# Patient Record
Sex: MALE | Race: WHITE | NOT HISPANIC OR LATINO | Employment: OTHER | URBAN - METROPOLITAN AREA
[De-identification: names, ages, dates, MRNs, and addresses within clinical notes are randomized per-mention and may not be internally consistent; named-entity substitution may affect disease eponyms.]

---

## 2017-01-09 DIAGNOSIS — J45.909 REACTIVE AIRWAY DISEASE: Primary | ICD-10-CM

## 2017-01-09 NOTE — TELEPHONE ENCOUNTER
montelukast (SINGULAIR) 10 MG tablet            Last Written Prescription Date: 11/21/2016  Last Fill Quantity: 30, # refills: 1    Last Office Visit with [facility], [facility] or [facility] prescribing provider:  12/14/2016   Future Office Visit:       Date of Last Asthma Action Plan Letter:   There are no preventive care reminders to display for this patient.   Asthma Control Test:   ACT Total Scores 12/14/2016   ACT TOTAL SCORE (Goal Greater than or Equal to 20) 15   In the past 12 months, how many times did you visit the emergency room for your asthma without being admitted to the hospital? 0   In the past 12 months, how many times were you hospitalized overnight because of your asthma? 0       Date of Last Spirometry Test:   No results found for this or any previous visit.

## 2017-01-10 RX ORDER — MONTELUKAST SODIUM 10 MG/1
TABLET ORAL
Qty: 30 TABLET | Refills: 3 | Status: SHIPPED | OUTPATIENT
Start: 2017-01-10 | End: 2017-02-20

## 2017-01-10 NOTE — TELEPHONE ENCOUNTER
Prescription approved per [facility].    Kelly Mcghee RN

## 2017-01-11 DIAGNOSIS — J45.909 REACTIVE AIRWAY DISEASE: Primary | ICD-10-CM

## 2017-01-11 NOTE — TELEPHONE ENCOUNTER
Per fax from pharmacy, 90 day supply is being requested - pended  Due for OV mid/late January    Pending Prescriptions:                       Disp   Refills    montelukast (SINGULAIR) 10 MG tablet      90 tab*0            Sig: TAKE 1 TABLET (10 MG) BY MOUTH AT BEDTIME - OV           due before end of January           Last Written Prescription Date: 1-10-17 30 day supply  Last Fill Quantity: 30, # refills: 3    Last Office Visit with [facility], [facility] or [facility] prescribing provider:  12-14-16   Future Office Visit:       Date of Last Asthma Action Plan Letter:   There are no preventive care reminders to display for this patient.   Asthma Control Test:   ACT Total Scores 12/14/2016   ACT TOTAL SCORE (Goal Greater than or Equal to 20) 15   In the past 12 months, how many times did you visit the emergency room for your asthma without being admitted to the hospital? 0   In the past 12 months, how many times were you hospitalized overnight because of your asthma? 0       Date of Last Spirometry Test:   No results found for this or any previous visit.    RT Annette (R)

## 2017-01-12 RX ORDER — MONTELUKAST SODIUM 10 MG/1
TABLET ORAL
Qty: 90 TABLET | Refills: 0 | OUTPATIENT
Start: 2017-01-12

## 2017-01-17 DIAGNOSIS — J98.01 BRONCHOSPASM: Primary | ICD-10-CM

## 2017-01-17 RX ORDER — FLUTICASONE PROPIONATE 110 UG/1
1 AEROSOL, METERED RESPIRATORY (INHALATION) 2 TIMES DAILY
Qty: 1 INHALER | Refills: 11 | Status: SHIPPED | OUTPATIENT
Start: 2017-01-17 | End: 2017-02-20

## 2017-01-17 NOTE — TELEPHONE ENCOUNTER
FLUTICASONE PROPIONATE  MCG/ACT IN AERO        Last Written Prescription Date:  ?  Last Fill Quantity: ?,   # refills: ?  Last Office Visit with [facility], [facility] or [facility] prescribing provider: 12/14/2016  Future Office visit:       Routing refill request to provider for review/approval because:  Drug not active on patient's medication list

## 2017-01-31 ENCOUNTER — TELEPHONE (OUTPATIENT)
Dept: FAMILY MEDICINE | Facility: CLINIC | Age: 67
End: 2017-01-31

## 2017-01-31 NOTE — TELEPHONE ENCOUNTER
Pt requesting appt. Was last seen 12/14/16, and was to be seen no later than 6 weeks out, which would be now.    Pt states had some improvement from last OV, but has plateaued and is not getting better. Requesting workin appt.  Please advise if any work in availability.  Teresita Cutler RN

## 2017-01-31 NOTE — TELEPHONE ENCOUNTER
Reason for call:  Patient reporting a symptom    Symptom or request: Asthma,    Duration (how long have symptoms been present): since November    Have you been treated for this before? Yes    Additional comments: has not gone away. Still having problems    Phone Number patient can be reached at:  Home number on file [phone] (Cell)    Best Time:  any    Can we leave a detailed message on this number:  YES    Call taken on 1/31/2017 at 9:24 AM by Kristin Trevino

## 2017-02-03 ENCOUNTER — TELEPHONE (OUTPATIENT)
Dept: FAMILY MEDICINE | Facility: CLINIC | Age: 67
End: 2017-02-03

## 2017-02-03 NOTE — TELEPHONE ENCOUNTER
Reason for Call:  Form, our goal is to have forms completed with 72 hours, however, some forms may require a visit or additional information.    Type of letter, form or note:  [employer]    Who is the form from?: Insurance comp    Where did the form come from: Patient or family brought in       What clinic location was the form placed at?: [facility]    Where the form was placed: Given to MA/RN    What number is listed as a contact on the form?: [phone]       Additional comments: Pt said this is time sensitive and wants it   Filled out asap; please call the pt when this is taken care of;  He will pick it up and fax it himself    Call taken on 2/3/2017 at 1:02 PM by Felicitas Braswell

## 2017-02-06 ENCOUNTER — TELEPHONE (OUTPATIENT)
Dept: FAMILY MEDICINE | Facility: CLINIC | Age: 67
End: 2017-02-06

## 2017-02-06 DIAGNOSIS — J98.01 ACUTE BRONCHOSPASM: Primary | ICD-10-CM

## 2017-02-06 NOTE — TELEPHONE ENCOUNTER
Forms left at , patient notified.  Copy sent to scanning and file.  Lashon Patrick, [facility]

## 2017-02-06 NOTE — TELEPHONE ENCOUNTER
Rebeka or Dontrell,  Have you guys seen these forms? They were not in Dr. Sotelo's outbox when I checked and the outgoing faxes were already emptied

## 2017-02-06 NOTE — TELEPHONE ENCOUNTER
Fax from [facility] #[id number] requesting 90 day supply of Advair.    Per 12-14-16 OV notes, patient was to have follow-up with you no later than 4-6 weeks.  Patient had appointment on 2-3-17 but cancelled.    Medication pended.    Please advise about follow up.    RT Annette (R)

## 2017-02-07 ENCOUNTER — TELEPHONE (OUTPATIENT)
Dept: FAMILY MEDICINE | Facility: CLINIC | Age: 67
End: 2017-02-07

## 2017-02-07 NOTE — TELEPHONE ENCOUNTER
Reason for Call:  Medication or medication refill:    Do you use a [facility]?  Name of the pharmacy and phone number for the current request:       [facility]/PHARMACY #[id number] - [facility] [address]      Name of the medication requested:     fluticasone-salmeterol (ADVAIR) 250-50 MCG/DOSE diskus inhaler         Other request: Pt's INS will only fill a 90 day supply. After review of the notes  I told pt that he needs to be seen. Pt said that when we called him to cancel his apt.  Pt only has 1 week supply left   On 2/3 Per Dr. Sotelo they rescheduled for 2/20 at 10:30am- I do not see this booked.    Can we leave a detailed message on this number? YES    Phone number patient can be reached at: Cell number on file:    Telephone Information:   Mobile [phone]       Best Time: anytime    Call taken on 2/7/2017 at 12:56 PM by Lelo Hernández

## 2017-02-07 NOTE — TELEPHONE ENCOUNTER
"We sent in 3 inhalers yesterday.  I think that is a 90 day supply.     Rebeka, can you look into the future appt that is not showing up?  I am curious about the \"They\" part of message.     ABNER Brooks          02/06/17 1524 Taking Flag Checked [facility] MD        Medication Detail         Disp Refills Start End DIMA     fluticasone-salmeterol (ADVAIR) 250-50 MCG/DOSE diskus inhaler 3 Inhaler 0 2/6/2017  No     Sig: Inhale 1 puff into the lungs 2 times daily     Class: E-Prescribe             E-Prescribing Status: Receipt confirmed by pharmacy (2/6/2017  3:24 PM CST)       Pharmacy      [facility]/PHARMACY #[id number] - [place], [place] - [address] [address]           "

## 2017-02-08 NOTE — TELEPHONE ENCOUNTER
Pt is now scheduled 02/20 at 11am.  [facility] to inform Pt.

## 2017-02-20 ENCOUNTER — OFFICE VISIT (OUTPATIENT)
Dept: FAMILY MEDICINE | Facility: CLINIC | Age: 67
End: 2017-02-20
Payer: MEDICARE

## 2017-02-20 VITALS
TEMPERATURE: 98 F | HEIGHT: 77 IN | BODY MASS INDEX: 27.22 KG/M2 | OXYGEN SATURATION: 95 % | WEIGHT: 230.5 LBS | HEART RATE: 88 BPM | SYSTOLIC BLOOD PRESSURE: 137 MMHG | DIASTOLIC BLOOD PRESSURE: 91 MMHG

## 2017-02-20 DIAGNOSIS — J45.901 REACTIVE AIRWAY DISEASE, UNSPECIFIED ASTHMA SEVERITY, WITH ACUTE EXACERBATION: ICD-10-CM

## 2017-02-20 DIAGNOSIS — I10 BENIGN ESSENTIAL HYPERTENSION: ICD-10-CM

## 2017-02-20 DIAGNOSIS — R05.3 CHRONIC COUGH: Primary | ICD-10-CM

## 2017-02-20 PROCEDURE — 99213 OFFICE O/P EST LOW 20 MIN: CPT | Performed by: INTERNAL MEDICINE

## 2017-02-20 RX ORDER — HYDRALAZINE HYDROCHLORIDE 50 MG/1
50 TABLET, FILM COATED ORAL 3 TIMES DAILY
Qty: 90 TABLET | Refills: 11 | Status: SHIPPED | OUTPATIENT
Start: 2017-02-20 | End: 2017-03-22

## 2017-02-20 RX ORDER — MONTELUKAST SODIUM 10 MG/1
TABLET ORAL
Qty: 30 TABLET | Refills: 3 | Status: SHIPPED | OUTPATIENT
Start: 2017-02-20 | End: 2017-08-04

## 2017-02-20 NOTE — MR AVS SNAPSHOT
After Visit Summary   2/20/2017    Alexandro Lee    MRN: [id number]           Patient Information     Date Of Birth          1950        Visit Information        Provider Department      2/20/2017 11:00 AM [facility] Sotelo MD [facility]        Today's Diagnoses     Chronic cough    -  1    Benign essential hypertension        Reactive airway disease, unspecified asthma severity, with acute exacerbation          Care Instructions    To address your chronic cough:    1.  Stop taking losartan and replace losartan with hydralazine 50mg three times daily     2.  Call to schedule pulmonary function testing so we can find out if asthma is the cause of your disabling cough.  [facility] ATS Registered: [phone]    3.  Schedule an appointment with Dr. Kevan Park  Allergy   [address] [id number] [phone]    Who is a cough specialist in case the above does not address your cough      4.  Return to see me in 4 weeks so we can follow up on your cough and blood pressure         Follow-ups after your visit        Follow-up notes from your care team     Return in about 4 weeks (around 3/20/2017) for Routine Visit.      Future tests that were ordered for you today     Open Future Orders        Priority Expected Expires Ordered    General PFT Lab (Please always keep checked) Routine  2/20/2018 2/20/2017    Pulmonary Function Test Routine  2/20/2018 2/20/2017            Who to contact     If you have questions or need follow up information about today's clinic visit or your schedule please contact [facility] directly at [phone].  Normal or non-critical lab and imaging results will be communicated to you by MyChart, letter or phone within 4 business days after the clinic has received the results. If you do not hear from us within 7 days, please contact the clinic through MyChart or phone. If you have a critical or  "abnormal lab result, we will notify you by phone as soon as possible.  Submit refill requests through [employer] or call your pharmacy and they will forward the refill request to us. Please allow 3 business days for your refill to be completed.          Additional Information About Your Visit        [facility]at[facility] [employer] Information     [employer] lets you send messages to your doctor, view your test results, renew your prescriptions, schedule appointments and more. To sign up, go to [email or web address] . Click on \"Log in\" on the left side of the screen, which will take you to the Welcome page. Then click on \"Sign up Now\" on the right side of the page.     You will be asked to enter the access code listed below, as well as some personal information. Please follow the directions to create your username and password.     Your access code is: [email or web address]  Expires: 2017 11:46 AM     Your access code will  in 90 days. If you need help or a new code, please call your [facility] clinic or [phone].        Care EveryWhere ID     This is your Care EveryWhere ID. This could be used by other organizations to access your [facility] medical records  [id number]        Your Vitals Were     Pulse Temperature Height Pulse Oximetry BMI (Body Mass Index)       88 98  F (36.7  C) (Oral) 6' 5\" (1.956 m) 95% 27.33 kg/m2        Blood Pressure from Last 3 Encounters:   17 (!) 137/91   16 (!) 142/91   16 118/86    Weight from Last 3 Encounters:   17 230 lb 8 oz (104.6 kg)   16 227 lb (103 kg)   16 229 lb 9.6 oz (104.1 kg)                 Today's Medication Changes          These changes are accurate as of: 17 11:47 AM.  If you have any questions, ask your nurse or doctor.               Start taking these medicines.        Dose/Directions    hydrALAZINE 50 MG tablet   Commonly known as:  APRESOLINE   Used for:  Benign essential hypertension   Started by:  Garrett Sotelo MD        Dose:  50 mg   Take 1 " tablet (50 mg) by mouth 3 times daily   Quantity:  90 tablet   Refills:  11         These medicines have changed or have updated prescriptions.        Dose/Directions    montelukast 10 MG tablet   Commonly known as:  SINGULAIR   This may have changed:  See the new instructions.   Used for:  Reactive airway disease, unspecified asthma severity, with acute exacerbation   Changed by:  Garrett Sotelo MD        TAKE 1 TABLET (10 MG) BY MOUTH AT BEDTIME   Quantity:  30 tablet   Refills:  3         Stop taking these medicines if you haven't already. Please contact your care team if you have questions.     fluticasone 110 MCG/ACT Inhaler   Commonly known as:  FLOVENT HFA   Stopped by:  Garrett Sotelo MD           losartan 100 MG tablet   Commonly known as:  COZAAR   Stopped by:  Garrett Sotelo MD           predniSONE 20 MG tablet   Commonly known as:  DELTASONE   Stopped by:  Garrett Sotelo MD                Where to get your medicines      These medications were sent to [facility]/pharmacy #[id number] - [facility] [address] [facility]  [address] [address] [facility] [address]     Phone:  [phone]     hydrALAZINE 50 MG tablet    montelukast 10 MG tablet                Primary Care Provider Office Phone # Fax #    Garrett Sotelo [phone]       [facility] [address] [facility] [address]        Thank you!     Thank you for choosing [facility]  for your care. Our goal is always to provide you with excellent care. Hearing back from our patients is one way we can continue to improve our services. Please take a few minutes to complete the written survey that you may receive in the mail after your visit with us. Thank you!             Your Updated Medication List - Protect others around you: Learn how to safely use, store and throw away your medicines at www.disposemymeds.org.          This list is accurate as of: 2/20/17 11:47 AM.  Always use your most recent med list.                    Brand Name Dispense Instructions for use    albuterol 108 (90 BASE) MCG/ACT Inhaler    PROAIR HFA/PROVENTIL HFA/VENTOLIN HFA    1 Inhaler    Inhale 1-2 puffs into the lungs every 4 hours as needed for shortness of breath / dyspnea or wheezing       AMLODIPINE BESYLATE PO      Take 10 mg by mouth       aspirin 81 MG tablet      Take 81 mg by mouth daily       atenolol 100 MG tablet    TENORMIN    90 tablet    Take 1 tablet by mouth daily.       CENTRUM SILVER per tablet      Take 1 tablet by mouth daily       fluticasone-salmeterol 250-50 MCG/DOSE diskus inhaler    ADVAIR    3 Inhaler    Inhale 1 puff into the lungs 2 times daily       glucosamine-chondroitin 500-400 MG Caps per capsule      Take 1 capsule by mouth 2 times daily       hydrALAZINE 50 MG tablet    APRESOLINE    90 tablet    Take 1 tablet (50 mg) by mouth 3 times daily       hydrochlorothiazide 25 MG tablet    HYDRODIURIL    90 tablet    Take 1 tablet by mouth daily.       montelukast 10 MG tablet    SINGULAIR    30 tablet    TAKE 1 TABLET (10 MG) BY MOUTH AT BEDTIME       OMEPRAZOLE PO      Take 40 mg by mouth every morning

## 2017-02-20 NOTE — PATIENT INSTRUCTIONS
To address your chronic cough:    1.  Stop taking losartan and replace losartan with hydralazine 50mg three times daily     2.  Call to schedule pulmonary function testing so we can find out if asthma is the cause of your disabling cough.  [facility] ATS Registered: [phone]    3.  Schedule an appointment with Dr. Kevan Park  Allergy   [address] [address] [address] [phone]    Who is a cough specialist in case the above does not address your cough      4.  Return to see me in 4 weeks so we can follow up on your cough and blood pressure

## 2017-02-20 NOTE — NURSING NOTE
"Chief Complaint   Patient presents with     Asthma       Initial BP (!) 137/91 (BP Location: Left arm, Patient Position: Chair, Cuff Size: Adult Large)  Pulse 88  Temp 98  F (36.7  C) (Oral)  Ht 6' 5\" (1.956 m)  Wt 230 lb 8 oz (104.6 kg)  SpO2 95%  BMI 27.33 kg/m2 Estimated body mass index is 27.33 kg/(m^2) as calculated from the following:    Height as of this encounter: 6' 5\" (1.956 m).    Weight as of this encounter: 230 lb 8 oz (104.6 kg).  Medication Reconciliation: complete       GUILHERME Peters MA February 20, 2017 11:02 AM      "

## 2017-02-20 NOTE — PROGRESS NOTES
SUBJECTIVE:                                                    Alexandro Lee is a 66 year old male who presents to clinic today for the following health issues:      Chief Complaint   Patient presents with     Asthma         This is a pleasant 66-year-old man with a history of hypertension who has been seen on several occasions with complaints of chronic cough. His cough has been present for at least 3 months. He has been treated with progressive treatments for bronchospasm and asthma without significant improvement in his cough. When specifically questioned today, he states that his most troublesome symptom is actually coughing rather than wheezing or shortness of breath. However, his cough can sometimes become so violent that it is difficult for him to breathe. He states that his cough is triggered by exertion in addition to cold weather. He states that his albuterol inhaler has a minimal impact on his coughing spells. Of note, he previously had to stop taking lisinopril due to cough side effects. He is on a maximal dose of losartan. He denies fevers, chills, weight loss, hemoptysis, chest pains. He did have a CT scan of his chest several weeks ago that did not demonstrate an acute cause for his cough and shortness of breath.    Problem list and histories reviewed & adjusted, as indicated.  Additional history: as documented    Patient Active Problem List   Diagnosis     Essential hypertension     CARDIOVASCULAR SCREENING; LDL GOAL LESS THAN 160     Nonspecific abnormal results of liver function study     Advanced directives, counseling/discussion     Left hip pain     Past Surgical History   Procedure Laterality Date     C nonspecific procedure       Left ACL and PCL repair     Orthopedic surgery  1980's     ACL and PCL repairs left knee     Biopsy       prostate     Arthroplasty hip anterior Left 3/17/2016     Procedure: ARTHROPLASTY HIP ANTERIOR;  Surgeon: Hilario Hicks MD;  Location:  OR       [facility]  History   Substance Use Topics     Smoking status: Never Smoker     Smokeless tobacco: Never Used     Alcohol use 0.0 oz/week     0 Standard drinks or equivalent per week      Comment: 2 glasses wine day     Family History   Problem Relation Age of Onset     DIABETES Father      HEART DISEASE Father          Current Outpatient Prescriptions   Medication Sig Dispense Refill     hydrALAZINE (APRESOLINE) 50 MG tablet Take 1 tablet (50 mg) by mouth 3 times daily 90 tablet 11     montelukast (SINGULAIR) 10 MG tablet TAKE 1 TABLET (10 MG) BY MOUTH AT BEDTIME 30 tablet 3     fluticasone-salmeterol (ADVAIR) 250-50 MCG/DOSE diskus inhaler Inhale 1 puff into the lungs 2 times daily 3 Inhaler 0     albuterol (PROAIR HFA/PROVENTIL HFA/VENTOLIN HFA) 108 (90 BASE) MCG/ACT Inhaler Inhale 1-2 puffs into the lungs every 4 hours as needed for shortness of breath / dyspnea or wheezing 1 Inhaler 11     AMLODIPINE BESYLATE PO Take 10 mg by mouth       aspirin 81 MG tablet Take 81 mg by mouth daily       glucosamine-chondroitin 500-400 MG CAPS Take 1 capsule by mouth 2 times daily       OMEPRAZOLE PO Take 40 mg by mouth every morning       Multiple Vitamins-Minerals (CENTRUM SILVER) per tablet Take 1 tablet by mouth daily       atenolol (TENORMIN) 100 MG tablet Take 1 tablet by mouth daily. 90 tablet 2     hydrochlorothiazide (HYDRODIURIL) 25 MG tablet Take 1 tablet by mouth daily. 90 tablet 3     [DISCONTINUED] montelukast (SINGULAIR) 10 MG tablet TAKE 1 TABLET (10 MG) BY MOUTH AT BEDTIME 30 tablet 3     Allergies   Allergen Reactions     Amoxicillin Hives     hives     Lisinopril      Cough       Tetanus Toxoids      DPT as a child caused severe swelling of arm       ROS:  Constitutional, HEENT, cardiovascular, pulmonary, gi and gu systems are negative, except as otherwise noted.    OBJECTIVE:                                                    BP (!) 137/91 (BP Location: Left arm, Patient Position: Chair, Cuff Size: Adult Large)  Pulse  "88  Temp 98  F (36.7  C) (Oral)  Ht 6' 5\" (1.956 m)  Wt 230 lb 8 oz (104.6 kg)  SpO2 95%  BMI 27.33 kg/m2  Body mass index is 27.33 kg/(m^2).  Gen.: This is a well-appearing middle-aged man in no acute distress. He appears well-nourished. He speaks in full sentences. Pulmonary: The lungs are clear to auscultation bilaterally, no wheezing, no dullness to percussion, no use of accessory muscles, good air movement. HEENT: The bilateral nostrils appear normal, the posterior pharynx appears normal.    Diagnostic Test Results:  Results for orders placed or performed during the hospital encounter of 12/19/16   CT Chest w Contrast    Narrative    CT CHEST WITH CONTRAST   12/19/2016 11:13 AM    HISTORY: Acute bronchospasm. Shortness of breath.    TECHNIQUE: 80 mL Isovue-370 were injected intravenously. After  contrast injection, volumetric helical acquisition was performed from  the thoracic inlet through the upper abdomen. Coronal images were also  reconstructed. Radiation dose for this scan was reduced using  automated exposure control, adjustment of the mA and/or kV according  to patient size, or iterative reconstruction technique.    COMPARISON: None.    FINDINGS: No pleural or pericardial effusions. A borderline enlarged  precarinal lymph node in the mediastinum contains a normal fatty  hilum, and is most likely benign. No other enlarged lymph nodes are  identified in the chest. Atherosclerotic calcification of the thoracic  aorta and coronary arteries. The lungs are clear. Limited views of the  upper abdomen are unremarkable. No aggressive-appearing bone lesions.  Mild degenerative changes throughout the thoracic spine.      Impression    IMPRESSION:   1. No acute abnormality in the chest. No cause for shortness of breath  is identified.   2. A borderline-enlarged mediastinal lymph node in the precarinal  region contains a normal fatty hilum, and is most likely of benign  etiology.    SANTI ATKINS MD    "     ASSESSMENT/PLAN:                                                            1. Chronic cough  This is a middle-age man with hypertension on an angiotensin receptor blocker who presents with chronic cough. He seems to be refractory to aggressive therapies to address bronchospasm and possible asthma. As such, alternative causes for his chronic cough must be entertained. Check pulmonary function testing to obtain more objective data regarding the possibility of bronchospasm contributing to his cough. Consider stopping bronchodilators pending results. Trial off of ARB to see if this address his cough. Replace ARB with hydralazine as below. Will need close follow-up for his blood pressure with these medication changes. Therefore, office visit in 4 weeks. If changing to ARB does not address cough, he was given contact information for a asthma allergy specialist (Dr. Park) who specializes in chronic cough.  - General PFT Lab (Please always keep checked); Future  - Pulmonary Function Test; Future    2. Benign essential hypertension    - hydrALAZINE (APRESOLINE) 50 MG tablet; Take 1 tablet (50 mg) by mouth 3 times daily  Dispense: 90 tablet; Refill: 11    3. Reactive airway disease, unspecified asthma severity, with acute exacerbation    - montelukast (SINGULAIR) 10 MG tablet; TAKE 1 TABLET (10 MG) BY MOUTH AT BEDTIME  Dispense: 30 tablet; Refill: 3    FUTURE APPOINTMENTS:       - Follow-up visit in 4 weeks or sooner pending symptoms    Garrett Sotelo MD  [facility]

## 2017-02-21 ASSESSMENT — ASTHMA QUESTIONNAIRES: ACT_TOTALSCORE: 15

## 2017-03-02 ENCOUNTER — HOSPITAL ENCOUNTER (OUTPATIENT)
Dept: RESPIRATORY THERAPY | Facility: CLINIC | Age: 67
Discharge: HOME | End: 2017-03-02
Attending: INTERNAL MEDICINE | Admitting: INTERNAL MEDICINE
Payer: MEDICARE

## 2017-03-02 ENCOUNTER — HOSPITAL ENCOUNTER (OUTPATIENT)
Dept: LAB | Facility: CLINIC | Age: 67
End: 2017-03-02
Attending: INTERNAL MEDICINE
Payer: MEDICARE

## 2017-03-02 DIAGNOSIS — R05.3 CHRONIC COUGH: ICD-10-CM

## 2017-03-02 LAB
DLCOCOR-%PRED-PRE: 87 %
DLCOCOR-PRE: 26.19 ML/MIN/MMHG
DLCOUNC-%PRED-PRE: 90 %
DLCOUNC-PRE: 27.1 ML/MIN/MMHG
DLCOUNC-PRED: 29.98 ML/MIN/MMHG
ERV-%PRED-PRE: 58 %
ERV-PRE: 0.67 L
ERV-PRED: 1.14 L
EXPTIME-PRE: 8.54 SEC
FEF2575-%PRED-POST: 17 %
FEF2575-%PRED-PRE: 18 %
FEF2575-POST: 0.55 L/SEC
FEF2575-PRE: 0.57 L/SEC
FEF2575-PRED: 3.12 L/SEC
FEFMAX-%PRED-PRE: 33 %
FEFMAX-PRE: 3.5 L/SEC
FEFMAX-PRED: 10.39 L/SEC
FEV1-%PRED-PRE: 34 %
FEV1-PRE: 1.42 L
FEV1FEV6-PRE: 48 %
FEV1FEV6-PRED: 78 %
FEV1FVC-PRE: 46 %
FEV1FVC-PRED: 75 %
FEV1SVC-PRE: 40 %
FEV1SVC-PRED: 75 %
FIFMAX-PRE: 4.6 L/SEC
FRCPLETH-%PRED-PRE: 114 %
FRCPLETH-PRE: 4.68 L
FRCPLETH-PRED: 4.08 L
FVC-%PRED-PRE: 56 %
FVC-PRE: 3.13 L
FVC-PRED: 5.54 L
GAW-%PRED-PRE: 30 %
GAW-PRE: 0.31 L/S/CMH2O
GAW-PRED: 1.03 L/S/CMH2O
HGB BLD-MCNC: 15.9 G/DL (ref 13.3–17.7)
IC-%PRED-PRE: 66 %
IC-PRE: 2.93 L
IC-PRED: 4.4 L
RVPLETH-%PRED-PRE: 144 %
RVPLETH-PRE: 4.02 L
RVPLETH-PRED: 2.78 L
SGAW-%PRED-PRE: 74 %
SGAW-PRE: 0.06 1/CMH2O*S
SGAW-PRED: 0.08 1/CMH2O*S
SRAW-%PRED-PRE: 352 %
SRAW-PRE: 16.79 CMH2O*S
SRAW-PRED: 4.76 CMH2O*S
TLCPLETH-%PRED-PRE: 89 %
TLCPLETH-PRE: 7.61 L
TLCPLETH-PRED: 8.55 L
VA-%PRED-PRE: 67 %
VA-PRE: 5.55 L
VC-%PRED-PRE: 64 %
VC-PRE: 3.59 L
VC-PRED: 5.54 L

## 2017-03-02 PROCEDURE — 94010 BREATHING CAPACITY TEST: CPT

## 2017-03-02 PROCEDURE — 40000275 ZZH STATISTIC RCP TIME EA 10 MIN

## 2017-03-02 PROCEDURE — 94726 PLETHYSMOGRAPHY LUNG VOLUMES: CPT

## 2017-03-02 PROCEDURE — 36415 COLL VENOUS BLD VENIPUNCTURE: CPT | Performed by: INTERNAL MEDICINE

## 2017-03-02 PROCEDURE — 25000125 ZZHC RX 250

## 2017-03-02 PROCEDURE — 94729 DIFFUSING CAPACITY: CPT

## 2017-03-02 PROCEDURE — 85018 HEMOGLOBIN: CPT | Performed by: INTERNAL MEDICINE

## 2017-03-02 PROCEDURE — 40000917 ZZH STATISTIC PEAK FLOW MEASUREMENT

## 2017-03-02 RX ORDER — ALBUTEROL SULFATE 0.83 MG/ML
SOLUTION RESPIRATORY (INHALATION)
Status: COMPLETED
Start: 2017-03-02 | End: 2017-03-02

## 2017-03-02 RX ADMIN — ALBUTEROL SULFATE: 2.5 SOLUTION RESPIRATORY (INHALATION) at 11:31

## 2017-03-02 NOTE — PROGRESS NOTES
PFT Note: Patient completed pulmonary function testing with pre/post spirometry, lung volumes and diffusion. Good patient effort and cooperation. The results of this test met the ATS standards for acceptability and repeatability. Albuterol neb 2.5 mg given for bronchodilation.

## 2017-03-02 NOTE — LETTER
[address] [facility]  [address] [place] [address]  [address]  Tel: [phone]    March 3, 2017    Alexandro MAYO Rosa  [address] [facility] [address]        Dear Tory Rosa,    The following letter pertains to your most recent diagnostic tests:    Your lung function testing shows moderate obstructive airway disease.  This could be the cause of your coughing.  I still think you should see Dr. Park and get his opinion on your cough.  I also hope that the change in you blood pressure medications has helped.  I look forward to seeing you back in a few weeks to recheck your blood pressure.          Sincerely,    Dr. Sotelo        Results for orders placed or performed during the hospital encounter of 03/02/17   General PFT Lab (Please always keep checked)   Result Value Ref Range    FVC-Pred 5.54 L    FVC-Pre 3.13 L    FVC-%Pred-Pre 56 %    FEV1-Pre 1.42 L    FEV1-%Pred-Pre 34 %    FEV1FVC-Pred 75 %    FEV1FVC-Pre 46 %    FEFMax-Pred 10.39 L/sec    FEFMax-Pre 3.50 L/sec    FEFMax-%Pred-Pre 33 %    FEF2575-Pred 3.12 L/sec    FEF2575-Pre 0.57 L/sec    LBU1041-%Pred-Pre 18 %    FEF2575-Post 0.55 L/sec    WRN5831-%Pred-Post 17 %    ExpTime-Pre 8.54 sec    FIFMax-Pre 4.60 L/sec    VC-Pred 5.54 L    VC-Pre 3.59 L    VC-%Pred-Pre 64 %    IC-Pred 4.40 L    IC-Pre 2.93 L    IC-%Pred-Pre 66 %    ERV-Pred 1.14 L    ERV-Pre 0.67 L    ERV-%Pred-Pre 58 %    FEV1FEV6-Pred 78 %    FEV1FEV6-Pre 48 %    FRCPleth-Pred 4.08 L    FRCPleth-Pre 4.68 L    FRCPleth-%Pred-Pre 114 %    RVPleth-Pred 2.78 L    RVPleth-Pre 4.02 L    RVPleth-%Pred-Pre 144 %    TLCPleth-Pred 8.55 L    TLCPleth-Pre 7.61 L    TLCPleth-%Pred-Pre 89 %    Gaw-Pred 1.03 L/s/cmH2O    Gaw-Pre 0.31 L/s/cmH2O    Gaw-%Pred-Pre 30 %    sGaw-Pred 0.08 1/cmH2O*s    sGaw-Pre 0.06 1/cmH2O*s    sGaw-%Pred-Pre 74 %    sRaw-Pred 4.76 cmH2O*s    sRaw-Pre 16.79 cmH2O*s    sRaw-%Pred-Pre 352 %    DLCOunc-Pred 29.98 ml/min/mmHg    DLCOunc-Pre 27.10 ml/min/mmHg     DLCOunc-%Pred-Pre 90 %    DLCOcor-Pre 26.19 ml/min/mmHg    DLCOcor-%Pred-Pre 87 %    VA-Pre 5.55 L    VA-%Pred-Pre 67 %    FEV1SVC-Pred 75 %    FEV1SVC-Pre 40 %

## 2017-03-02 NOTE — LETTER
[address]  [address]  [place]  [address]  Tel: [phone]    March 3, 2017    Alexandro MAYO Rosa  [address] [facility] [address]        Dear Mr. Lee,    The following letter pertains to your most recent diagnostic tests:    Your hemoglobin returned normal.      If you have any further questions or problems, please contact our office.      Sincerely,    Garrett Sotelo MD/nathaniel    Results for orders placed or performed during the hospital encounter of 03/02/17   Hemoglobin   Result Value Ref Range    Hemoglobin 15.9 13.3 - 17.7 g/dL           Enclosure: Lab Results

## 2017-03-03 NOTE — PROGRESS NOTES
The following letter pertains to your most recent diagnostic tests:    Your hemoglobin returned normal.        Sincerely,    Dr. Sotelo

## 2017-03-22 ENCOUNTER — RADIANT APPOINTMENT (OUTPATIENT)
Dept: GENERAL RADIOLOGY | Facility: CLINIC | Age: 67
End: 2017-03-22
Attending: INTERNAL MEDICINE
Payer: MEDICARE

## 2017-03-22 ENCOUNTER — OFFICE VISIT (OUTPATIENT)
Dept: FAMILY MEDICINE | Facility: CLINIC | Age: 67
End: 2017-03-22
Payer: MEDICARE

## 2017-03-22 VITALS
HEART RATE: 93 BPM | OXYGEN SATURATION: 96 % | BODY MASS INDEX: 25.36 KG/M2 | WEIGHT: 214.8 LBS | HEIGHT: 77 IN | TEMPERATURE: 97.2 F | SYSTOLIC BLOOD PRESSURE: 108 MMHG | DIASTOLIC BLOOD PRESSURE: 67 MMHG

## 2017-03-22 DIAGNOSIS — E87.1 HYPONATREMIA: ICD-10-CM

## 2017-03-22 DIAGNOSIS — R53.83 FATIGUE, UNSPECIFIED TYPE: ICD-10-CM

## 2017-03-22 DIAGNOSIS — R06.00 DYSPNEA, UNSPECIFIED TYPE: ICD-10-CM

## 2017-03-22 DIAGNOSIS — R06.00 DYSPNEA, UNSPECIFIED TYPE: Primary | ICD-10-CM

## 2017-03-22 DIAGNOSIS — R05.9 COUGH: ICD-10-CM

## 2017-03-22 DIAGNOSIS — I10 BENIGN ESSENTIAL HYPERTENSION: ICD-10-CM

## 2017-03-22 LAB
ERYTHROCYTE [DISTWIDTH] IN BLOOD BY AUTOMATED COUNT: 12.6 % (ref 10–15)
HCT VFR BLD AUTO: 44.6 % (ref 40–53)
HGB BLD-MCNC: 16.1 G/DL (ref 13.3–17.7)
MCH RBC QN AUTO: 30.9 PG (ref 26.5–33)
MCHC RBC AUTO-ENTMCNC: 36.1 G/DL (ref 31.5–36.5)
MCV RBC AUTO: 86 FL (ref 78–100)
PLATELET # BLD AUTO: 293 10E9/L (ref 150–450)
RBC # BLD AUTO: 5.21 10E12/L (ref 4.4–5.9)
WBC # BLD AUTO: 8.3 10E9/L (ref 4–11)

## 2017-03-22 PROCEDURE — 99213 OFFICE O/P EST LOW 20 MIN: CPT | Performed by: INTERNAL MEDICINE

## 2017-03-22 PROCEDURE — 80053 COMPREHEN METABOLIC PANEL: CPT | Performed by: INTERNAL MEDICINE

## 2017-03-22 PROCEDURE — 71020 XR CHEST 2 VW: CPT

## 2017-03-22 PROCEDURE — 85027 COMPLETE CBC AUTOMATED: CPT | Performed by: INTERNAL MEDICINE

## 2017-03-22 PROCEDURE — 36415 COLL VENOUS BLD VENIPUNCTURE: CPT | Performed by: INTERNAL MEDICINE

## 2017-03-22 RX ORDER — HYDRALAZINE HYDROCHLORIDE 50 MG/1
50 TABLET, FILM COATED ORAL 2 TIMES DAILY
Qty: 180 TABLET | Refills: 3 | COMMUNITY
Start: 2017-03-22 | End: 2017-04-11

## 2017-03-22 NOTE — PROGRESS NOTES
SUBJECTIVE:                                                    Alexandro Lee is a 67 year old male who presents to clinic today for the following health issues:      Chief Complaint   Patient presents with     Follow Up For     Cough And Hypertension         RESPIRATORY SYMPTOMS      Duration: 2 Weeks    Description  sore throat, cough, fever, chills, fatigue/malaise and nausea    Severity: moderate    Accompanying signs and symptoms: None    History (predisposing factors):  none    Precipitating or alleviating factors: None    Therapies tried and outcome:  rest and fluids oral decongestant OTC NSAID       Problem list and histories reviewed & adjusted, as indicated.  Additional history: as documented    This is a pleasant 67-year-old male who has never been a smoker who has been troubled by several month history of persistent cough. His cough is about 50% better after stopping losartan. He is tolerating hydralazine in lieu of of losartan without issue. He presented for pulmonary function testing which demonstrated mild obstructive lung disease. He continues with his inhaled bronchodilators and steroids. Continues to take montelukast. He does have an appointment to see the allergy cough specialist. He thinks that he might have caught a viral illness at the time that he presented for his pulmonary function test. His illness was characterized by severe sore throat, cough, fevers, chills, severe fatigue and malaise. He has had symptoms for about 2 weeks and they are just now starting to improve.    Reviewed and updated as needed this visit by clinical staff  Tobacco  Allergies  Med Hx  Surg Hx  Fam Hx  Soc Hx      Reviewed and updated as needed this visit by Provider         ROS:  Pertinent positive and negatives are reviewed in history present illness, he denies diarrhea or blood in his stools, current sputum production or current fevers, rash    OBJECTIVE:                                                    BP  "108/67 (BP Location: Left arm, Patient Position: Chair, Cuff Size: Adult Large)  Pulse 93  Temp 97.2  F (36.2  C) (Oral)  Ht 6' 5\" (1.956 m)  Wt 214 lb 12.8 oz (97.4 kg)  SpO2 96%  BMI 25.47 kg/m2  Body mass index is 25.47 kg/(m^2).  Gen.: This is a mildly ill-appearing middle-age man in no acute distress. He speaks in full sentences. He does not appear toxic. HEENT: The bilateral tympanic membranes appear normal, the nasal exam is normal, the posterior pharynx appears normal, there is no cervical adenopathy, the neck is supple. Cardiovascular: The heart has a regular rate and rhythm. Pulmonary: The lungs are clear to auscultation bilaterally, no dullness to percussion, no use of accessory muscles, good air movement. Extremities: Well-perfused without edema clubbing or cyanosis. Skin: No obvious rashes, normal skin turgor. Neurological: He is very mildly lethargic, however he is alert and oriented to person place and time, radial nerves II-12. Grossly intact, he has normal gait.    Diagnostic Test Results:  Results for orders placed or performed in visit on 03/22/17   CBC with platelets   Result Value Ref Range    WBC 8.3 4.0 - 11.0 10e9/L    RBC Count 5.21 4.4 - 5.9 10e12/L    Hemoglobin 16.1 13.3 - 17.7 g/dL    Hematocrit 44.6 40.0 - 53.0 %    MCV 86 78 - 100 fl    MCH 30.9 26.5 - 33.0 pg    MCHC 36.1 31.5 - 36.5 g/dL    RDW 12.6 10.0 - 15.0 %    Platelet Count 293 150 - 450 10e9/L        ASSESSMENT/PLAN:                                                              ICD-10-CM    1. Dyspnea, unspecified type R06.00 Comprehensive metabolic panel     CBC with platelets     XR Chest 2 Views   2. Fatigue, unspecified type R53.83 Comprehensive metabolic panel     CBC with platelets     XR Chest 2 Views   3. Cough R05    4. Benign essential hypertension I10 hydrALAZINE (APRESOLINE) 50 MG tablet     His chronic cough seems to be improving with the interventions put in place at his previous visit. Input from the cough " specialist may be helpful when he recovers from his recent viral upper respiratory illness. The improvement in his chronic cough is slightly derailed by the acquisition of a probable viral upper respiratory illness about 2 weeks ago. However, given the duration of his residual fatigue and malaise, I decided to check a metabolic panel, CBC and chest x-ray today. If those are normal, he likely will just need several more weeks to recover completely from his severe respiratory infection. Regarding his blood pressure, it is very well controlled on hydralazine in lieu of losartan.      FUTURE APPOINTMENTS:       - Pending symptoms and laboratory investigations    Garrett Sotelo MD  [facility]

## 2017-03-22 NOTE — NURSING NOTE
"Chief Complaint   Patient presents with     Follow Up For     Cough And Hypertension       Initial /67 (BP Location: Left arm, Patient Position: Chair, Cuff Size: Adult Large)  Pulse 93  Temp 97.2  F (36.2  C) (Oral)  Ht 6' 5\" (1.956 m)  Wt 214 lb 12.8 oz (97.4 kg)  SpO2 96%  BMI 25.47 kg/m2 Estimated body mass index is 25.47 kg/(m^2) as calculated from the following:    Height as of this encounter: 6' 5\" (1.956 m).    Weight as of this encounter: 214 lb 12.8 oz (97.4 kg).  Medication Reconciliation: complete     GUILHERME Peters MA March 22, 2017 11:08 AM      "

## 2017-03-22 NOTE — MR AVS SNAPSHOT
"              After Visit Summary   3/22/2017    Alexandro Lee    MRN: [id number]           Patient Information     Date Of Birth          1950        Visit Information        Provider Department      3/22/2017 11:00 AM Garrett Sotelo MD [facility]        Today's Diagnoses     Dyspnea, unspecified type    -  1    Fatigue, unspecified type        Cough        Benign essential hypertension           Follow-ups after your visit        Who to contact     If you have questions or need follow up information about today's clinic visit or your schedule please contact [facility] directly at [phone].  Normal or non-critical lab and imaging results will be communicated to you by [employer], letter or phone within 4 business days after the clinic has received the results. If you do not hear from us within 7 days, please contact the clinic through [employer] [facility] or phone. If you have a critical or abnormal lab result, we will notify you by phone as soon as possible.  Submit refill requests through [employer] or call your pharmacy and they will forward the refill request to us. Please allow 3 business days for your refill to be completed.          Additional Information About Your Visit        MyChart Information     Humagade lets you send messages to your doctor, view your test results, renew your prescriptions, schedule appointments and more. To sign up, go to [email or web address] . Click on \"Log in\" on the left side of the screen, which will take you to the Welcome page. Then click on \"Sign up Now\" on the right side of the page.     You will be asked to enter the access code listed below, as well as some personal information. Please follow the directions to create your username and password.     Your access code is: [email or web address]  Expires: 2017 12:46 PM     Your access code will  in 90 days. If you need help or a new code, please call your [facility] or [phone].        Care " "EveryWhere ID     This is your Care EveryWhere ID. This could be used by other organizations to access your [place] medical records  [id number]        Your Vitals Were     Pulse Temperature Height Pulse Oximetry BMI (Body Mass Index)       93 97.2  F (36.2  C) (Oral) 6' 5\" (1.956 m) 96% 25.47 kg/m2        Blood Pressure from Last 3 Encounters:   03/22/17 108/67   02/20/17 (!) 137/91   12/14/16 (!) 142/91    Weight from Last 3 Encounters:   03/22/17 214 lb 12.8 oz (97.4 kg)   02/20/17 230 lb 8 oz (104.6 kg)   12/14/16 227 lb (103 kg)              We Performed the Following     CBC with platelets     Comprehensive metabolic panel          Today's Medication Changes          These changes are accurate as of: 3/22/17  5:32 PM.  If you have any questions, ask your nurse or doctor.               These medicines have changed or have updated prescriptions.        Dose/Directions    hydrALAZINE 50 MG tablet   Commonly known as:  APRESOLINE   This may have changed:  when to take this   Used for:  Benign essential hypertension   Changed by:  Garrett Sotelo MD        Dose:  50 mg   Take 1 tablet (50 mg) by mouth 2 times daily   Quantity:  180 tablet   Refills:  3                Primary Care Provider Office Phone # Fax #    Garrett Sotelo [phone]       [facility] [address]  [facility] [address]        Thank you!     Thank you for choosing [facility]  for your care. Our goal is always to provide you with excellent care. Hearing back from our patients is one way we can continue to improve our services. Please take a few minutes to complete the written survey that you may receive in the mail after your visit with us. Thank you!             Your Updated Medication List - Protect others around you: Learn how to safely use, store and throw away your medicines at www.disposemymeds.org.          This list is accurate as of: 3/22/17  5:32 PM.  Always use your most recent med list.    "                Brand Name Dispense Instructions for use    albuterol 108 (90 BASE) MCG/ACT Inhaler    PROAIR HFA/PROVENTIL HFA/VENTOLIN HFA    1 Inhaler    Inhale 1-2 puffs into the lungs every 4 hours as needed for shortness of breath / dyspnea or wheezing       AMLODIPINE BESYLATE PO      Take 10 mg by mouth       aspirin 81 MG tablet      Take 81 mg by mouth daily       atenolol 100 MG tablet    TENORMIN    90 tablet    Take 1 tablet by mouth daily.       CENTRUM SILVER per tablet      Take 1 tablet by mouth daily       fluticasone-salmeterol 250-50 MCG/DOSE diskus inhaler    ADVAIR    3 Inhaler    Inhale 1 puff into the lungs 2 times daily       glucosamine-chondroitin 500-400 MG Caps per capsule      Take 1 capsule by mouth 2 times daily       hydrALAZINE 50 MG tablet    APRESOLINE    180 tablet    Take 1 tablet (50 mg) by mouth 2 times daily       hydrochlorothiazide 25 MG tablet    HYDRODIURIL    90 tablet    Take 1 tablet by mouth daily.       montelukast 10 MG tablet    SINGULAIR    30 tablet    TAKE 1 TABLET (10 MG) BY MOUTH AT BEDTIME       OMEPRAZOLE PO      Take 40 mg by mouth every morning

## 2017-03-23 LAB
ALBUMIN SERPL-MCNC: 2.9 G/DL (ref 3.4–5)
ALP SERPL-CCNC: 86 U/L (ref 40–150)
ALT SERPL W P-5'-P-CCNC: 72 U/L (ref 0–70)
ANION GAP SERPL CALCULATED.3IONS-SCNC: 8 MMOL/L (ref 3–14)
AST SERPL W P-5'-P-CCNC: 45 U/L (ref 0–45)
BILIRUB SERPL-MCNC: 1 MG/DL (ref 0.2–1.3)
BUN SERPL-MCNC: 12 MG/DL (ref 7–30)
CALCIUM SERPL-MCNC: 8.8 MG/DL (ref 8.5–10.1)
CHLORIDE SERPL-SCNC: 79 MMOL/L (ref 94–109)
CO2 SERPL-SCNC: 37 MMOL/L (ref 20–32)
CREAT SERPL-MCNC: 0.6 MG/DL (ref 0.66–1.25)
GFR SERPL CREATININE-BSD FRML MDRD: ABNORMAL ML/MIN/1.7M2
GLUCOSE SERPL-MCNC: 111 MG/DL (ref 70–99)
POTASSIUM SERPL-SCNC: 3.1 MMOL/L (ref 3.4–5.3)
PROT SERPL-MCNC: 8 G/DL (ref 6.8–8.8)
SODIUM SERPL-SCNC: 124 MMOL/L (ref 133–144)

## 2017-03-27 DIAGNOSIS — E87.1 HYPONATREMIA: ICD-10-CM

## 2017-03-27 PROCEDURE — 36415 COLL VENOUS BLD VENIPUNCTURE: CPT | Performed by: INTERNAL MEDICINE

## 2017-03-27 PROCEDURE — 80053 COMPREHEN METABOLIC PANEL: CPT | Performed by: INTERNAL MEDICINE

## 2017-03-27 NOTE — LETTER
[address] [facility]  [address]  [place]  [address]  Tel: [phone]    March 28, 2017    Alexandro Lee  [address] [facility] [address]        Dear Mr. Lee,    The following letter pertains to your most recent diagnostic tests:    Your sodium improved.  Your potassium is worse.  I would not recommend restarting     HYDROCHLOROTHIAZIDE based on these results.  I would recommend starting a potassium supplement     based on these results and I sent an prescritpion to your pharmacy.  I would recommend that we recheck     these labs after you have been taking the potassium supplement for 10-14 days.  You can schedule a lab     appointment for that purpose.   We should also recheck your blood pressure off of hydrochlorothiazide, you     should schedule an office visit appointment with me for that purpose.    Sincerely,    Garrett Sotelo MD/Stefanie    Enclosure: Lab Results    Results for orders placed or performed in visit on 03/27/17   **Comprehensive metabolic panel FUTURE 14d   Result Value Ref Range    Sodium 129 (L) 133 - 144 mmol/L    Potassium 3.0 (L) 3.4 - 5.3 mmol/L    Chloride 89 (L) 94 - 109 mmol/L    Carbon Dioxide 34 (H) 20 - 32 mmol/L    Anion Gap 6 3 - 14 mmol/L    Glucose 100 (H) 70 - 99 mg/dL    Urea Nitrogen 6 (L) 7 - 30 mg/dL    Creatinine 0.58 (L) 0.66 - 1.25 mg/dL    GFR Estimate >90  Non  GFR Calc   >60 mL/min/1.7m2    GFR Estimate If Black >90   GFR Calc   >60 mL/min/1.7m2    Calcium 8.4 (L) 8.5 - 10.1 mg/dL    Bilirubin Total 0.9 0.2 - 1.3 mg/dL    Albumin 2.9 (L) 3.4 - 5.0 g/dL    Protein Total 7.7 6.8 - 8.8 g/dL    Alkaline Phosphatase 75 40 - 150 U/L    ALT 51 0 - 70 U/L    AST 35 0 - 45 U/L

## 2017-03-28 LAB
ALBUMIN SERPL-MCNC: 2.9 G/DL (ref 3.4–5)
ALP SERPL-CCNC: 75 U/L (ref 40–150)
ALT SERPL W P-5'-P-CCNC: 51 U/L (ref 0–70)
ANION GAP SERPL CALCULATED.3IONS-SCNC: 6 MMOL/L (ref 3–14)
AST SERPL W P-5'-P-CCNC: 35 U/L (ref 0–45)
BILIRUB SERPL-MCNC: 0.9 MG/DL (ref 0.2–1.3)
BUN SERPL-MCNC: 6 MG/DL (ref 7–30)
CALCIUM SERPL-MCNC: 8.4 MG/DL (ref 8.5–10.1)
CHLORIDE SERPL-SCNC: 89 MMOL/L (ref 94–109)
CO2 SERPL-SCNC: 34 MMOL/L (ref 20–32)
CREAT SERPL-MCNC: 0.58 MG/DL (ref 0.66–1.25)
GFR SERPL CREATININE-BSD FRML MDRD: ABNORMAL ML/MIN/1.7M2
GLUCOSE SERPL-MCNC: 100 MG/DL (ref 70–99)
POTASSIUM SERPL-SCNC: 3 MMOL/L (ref 3.4–5.3)
PROT SERPL-MCNC: 7.7 G/DL (ref 6.8–8.8)
SODIUM SERPL-SCNC: 129 MMOL/L (ref 133–144)

## 2017-03-28 NOTE — PROGRESS NOTES
The following letter pertains to your most recent diagnostic tests:    Your sodium improved.  Your potassium is worse.  I would not recommend restarting HYDROCHLOROTHIAZIDE based on these results.  I would recommend starting a potassium supplement based on these results and I sent an prescritpion to your pharmacy.  I would recommend that we recheck these labs after you have been taking the potassium supplement for 10-14 days.  You can schedule a lab appointment for that purpose.   We should also recheck your blood pressure off of hydrochlorothiazide, you should schedule an office visit appointment with me for that purpose.            Sincerely,    Dr. Sotelo

## 2017-03-31 ENCOUNTER — TRANSFERRED RECORDS (OUTPATIENT)
Dept: HEALTH INFORMATION MANAGEMENT | Facility: CLINIC | Age: 67
End: 2017-03-31

## 2017-04-07 DIAGNOSIS — E87.1 HYPONATREMIA: ICD-10-CM

## 2017-04-07 PROCEDURE — 36415 COLL VENOUS BLD VENIPUNCTURE: CPT | Performed by: INTERNAL MEDICINE

## 2017-04-07 PROCEDURE — 80048 BASIC METABOLIC PNL TOTAL CA: CPT | Performed by: INTERNAL MEDICINE

## 2017-04-07 NOTE — LETTER
[address] [facility]  [address]  [place]  [address]  Tel: [phone]    April 10, 2017    Alexandro Lee  [address] [facility] [address]        Dear Mr. Lee,    The following letter pertains to your most recent diagnostic tests:    Good news! Your sodium is back to normal!    Sincerely,    Garrett Sotelo MD/Stefanie    Enclosure: Lab Results    Results for orders placed or performed in visit on 04/07/17   **Basic metabolic panel FUTURE 14d   Result Value Ref Range    Sodium 136 133 - 144 mmol/L    Potassium 4.1 3.4 - 5.3 mmol/L    Chloride 100 94 - 109 mmol/L    Carbon Dioxide 28 20 - 32 mmol/L    Anion Gap 8 3 - 14 mmol/L    Glucose 104 (H) 70 - 99 mg/dL    Urea Nitrogen 9 7 - 30 mg/dL    Creatinine 0.65 (L) 0.66 - 1.25 mg/dL    GFR Estimate >90  Non  GFR Calc   >60 mL/min/1.7m2    GFR Estimate If Black >90   GFR Calc   >60 mL/min/1.7m2    Calcium 9.0 8.5 - 10.1 mg/dL

## 2017-04-08 LAB
ANION GAP SERPL CALCULATED.3IONS-SCNC: 8 MMOL/L (ref 3–14)
BUN SERPL-MCNC: 9 MG/DL (ref 7–30)
CALCIUM SERPL-MCNC: 9 MG/DL (ref 8.5–10.1)
CHLORIDE SERPL-SCNC: 100 MMOL/L (ref 94–109)
CO2 SERPL-SCNC: 28 MMOL/L (ref 20–32)
CREAT SERPL-MCNC: 0.65 MG/DL (ref 0.66–1.25)
GFR SERPL CREATININE-BSD FRML MDRD: ABNORMAL ML/MIN/1.7M2
GLUCOSE SERPL-MCNC: 104 MG/DL (ref 70–99)
POTASSIUM SERPL-SCNC: 4.1 MMOL/L (ref 3.4–5.3)
SODIUM SERPL-SCNC: 136 MMOL/L (ref 133–144)

## 2017-04-09 NOTE — PROGRESS NOTES
The following letter pertains to your most recent diagnostic tests:    Good news! Your sodium is back to normal!            Sincerely,    Dr. Sotelo

## 2017-04-11 DIAGNOSIS — I10 BENIGN ESSENTIAL HYPERTENSION: ICD-10-CM

## 2017-04-12 RX ORDER — HYDRALAZINE HYDROCHLORIDE 50 MG/1
50 TABLET, FILM COATED ORAL 2 TIMES DAILY
Qty: 180 TABLET | Refills: 3 | Status: SHIPPED | OUTPATIENT
Start: 2017-04-12 | End: 2018-01-04

## 2017-04-12 NOTE — TELEPHONE ENCOUNTER
"Pt is requesting 90 day supply. Rx is historical.  SIG from pharmacy: \"Take 1 tablet (50mg) by mouth 3 times daily\"    Pending Prescriptions:                       Disp   Refills    hydrALAZINE (APRESOLINE) 50 MG tablet     180 ta*3            Sig: Take 1 tablet (50 mg) by mouth 2 times daily          Last Written Prescription Date: Historical  Last Fill Quantity: -, # refills: -    Last Office Visit with [facility], [facility] or [facility] prescribing provider:  3/22/17 Deepak   Future Office Visit:        Routing refill request to provider for review/approval because:  Medication is reported/historical      BP Readings from Last 3 Encounters:   03/22/17 108/67   02/20/17 (!) 137/91   12/14/16 (!) 142/91     Mimi Enciso RT(R)        "

## 2017-04-14 ENCOUNTER — OFFICE VISIT (OUTPATIENT)
Dept: FAMILY MEDICINE | Facility: CLINIC | Age: 67
End: 2017-04-14
Payer: MEDICARE

## 2017-04-14 VITALS
HEART RATE: 89 BPM | DIASTOLIC BLOOD PRESSURE: 88 MMHG | HEIGHT: 77 IN | WEIGHT: 213.6 LBS | OXYGEN SATURATION: 96 % | BODY MASS INDEX: 25.22 KG/M2 | TEMPERATURE: 97.3 F | SYSTOLIC BLOOD PRESSURE: 138 MMHG

## 2017-04-14 DIAGNOSIS — E87.1 HYPONATREMIA: ICD-10-CM

## 2017-04-14 DIAGNOSIS — J31.0 CHRONIC RHINITIS: ICD-10-CM

## 2017-04-14 DIAGNOSIS — Z23 NEED FOR VACCINATION: ICD-10-CM

## 2017-04-14 DIAGNOSIS — E87.6 HYPOKALEMIA: ICD-10-CM

## 2017-04-14 DIAGNOSIS — I10 ESSENTIAL HYPERTENSION: ICD-10-CM

## 2017-04-14 DIAGNOSIS — I10 ESSENTIAL HYPERTENSION: Primary | ICD-10-CM

## 2017-04-14 DIAGNOSIS — J45.40 MODERATE PERSISTENT ASTHMA WITHOUT COMPLICATION: ICD-10-CM

## 2017-04-14 PROCEDURE — 99213 OFFICE O/P EST LOW 20 MIN: CPT | Mod: 25 | Performed by: INTERNAL MEDICINE

## 2017-04-14 PROCEDURE — 90732 PPSV23 VACC 2 YRS+ SUBQ/IM: CPT | Performed by: INTERNAL MEDICINE

## 2017-04-14 PROCEDURE — 90471 IMMUNIZATION ADMIN: CPT | Performed by: INTERNAL MEDICINE

## 2017-04-14 RX ORDER — HYDROCHLOROTHIAZIDE 25 MG/1
TABLET ORAL
Qty: 90 TABLET | Refills: 3 | Status: SHIPPED | OUTPATIENT
Start: 2017-04-14 | End: 2017-04-14

## 2017-04-14 RX ORDER — ALBUTEROL SULFATE 90 UG/1
1-2 AEROSOL, METERED RESPIRATORY (INHALATION) EVERY 4 HOURS PRN
Qty: 1 INHALER | Refills: 11 | Status: SHIPPED | OUTPATIENT
Start: 2017-04-14 | End: 2018-01-04

## 2017-04-14 RX ORDER — IPRATROPIUM BROMIDE 42 UG/1
2 SPRAY, METERED NASAL 4 TIMES DAILY PRN
Qty: 3 BOX | Refills: 3 | Status: SHIPPED | OUTPATIENT
Start: 2017-04-14 | End: 2017-08-14

## 2017-04-14 RX ORDER — AMLODIPINE BESYLATE 5 MG/1
TABLET ORAL
Qty: 90 TABLET | Refills: 3 | Status: SHIPPED | OUTPATIENT
Start: 2017-04-14 | End: 2018-01-04

## 2017-04-14 RX ORDER — ATENOLOL 100 MG/1
TABLET ORAL
Qty: 90 TABLET | Refills: 3 | Status: SHIPPED | OUTPATIENT
Start: 2017-04-14 | End: 2018-01-04

## 2017-04-14 NOTE — TELEPHONE ENCOUNTER
Routing refill request to provider for review/approval because:  Amlodipine is listed at patient reported  Atenolol and HCTZ were last prescribed in 2012--has dose changed since?    Please verify if the pharmacy requesting doses are accurate for patient?    Jennifer Davis RN

## 2017-04-14 NOTE — NURSING NOTE
"Chief Complaint   Patient presents with     Hypertension       Initial /88 (BP Location: Left arm, Patient Position: Chair, Cuff Size: Adult Large)  Pulse 89  Temp 97.3  F (36.3  C) (Oral)  Ht 6' 5\" (1.956 m)  Wt 213 lb 9.6 oz (96.9 kg)  SpO2 96%  BMI 25.33 kg/m2 Estimated body mass index is 25.33 kg/(m^2) as calculated from the following:    Height as of this encounter: 6' 5\" (1.956 m).    Weight as of this encounter: 213 lb 9.6 oz (96.9 kg).  Medication Reconciliation: complete   Desiree Delatorre MA  "

## 2017-04-14 NOTE — NURSING NOTE
Screening Questionnaire for Adult Immunization    Are you sick today?   No   Do you have allergies to medications, food, a vaccine component or latex?   Yes   Have you ever had a serious reaction after receiving a vaccination?   Yes   Do you have a long-term health problem with heart disease, lung disease, asthma, kidney disease, metabolic disease (e.g. diabetes), anemia, or other blood disorder?   Yes   Do you have cancer, leukemia, HIV/AIDS, or any other immune system problem?   No   In the past 3 months, have you taken medications that affect  your immune system, such as prednisone, other steroids, or anticancer drugs; drugs for the treatment of rheumatoid arthritis, Crohn s disease, or psoriasis; or have you had radiation treatments?   No   Have you had a seizure, or a brain or other nervous system problem?   No   During the past year, have you received a transfusion of blood or blood     products, or been given immune (gamma) globulin or antiviral drug?   No   For women: Are you pregnant or is there a chance you could become        pregnant during the next month?   No   Have you received any vaccinations in the past 4 weeks?   No     Immunization questionnaire was positive for at least one answer.  Notified Dr. Sotelo.      MNVFC doesn't apply on this patient    Per orders of Dr. Sotelo, injection of Pneumovax 23 given by Desiree Delatorre. Patient instructed to remain in clinic for 20 minutes afterwards, and to report any adverse reaction to me immediately.       Screening performed by Desiree Delatorre on 4/14/2017 at 3:50 PM.

## 2017-04-14 NOTE — PROGRESS NOTES
SUBJECTIVE:                                                    Alexandro Lee is a 67 year old male who presents to clinic today for the following health issues:      Hypertension Follow-up      Outpatient blood pressures are not being checked.    Low Salt Diet: no added salt       Amount of exercise or physical activity: none recently, will walk when feeling better    Problems taking medications regularly: No    Medication side effects: none    Diet: regular (no restrictions)    This is a man who was recently diagnosed with asthma who is getting over an influenza like illness that was complicated by hyponatremia and hypokalemia probably related to acute illness and use of thiazide diuretics. His most recent metabolic panel showed resolution of his hyponatremia and hypokalemia. His hydrochlorothiazide was discontinued. He is here to follow-up his blood pressure after that medication change. He saw a allergist/cough specialist who could not find any specific allergies to explain his asthma and did not have any other recommendations regarding his cough. He states that he often gets rhinorrhea and postnasal drainage following viral respiratory illness that sometimes last for weeks or months. He attributes his most current cough which is not productive of sputum and not associated with dyspnea or fevers with postnasal drainage and nasal drip. His most current cough affects him most when he lays down in his bed to go to sleep at night.    Problem list and histories reviewed & adjusted, as indicated.  Additional history: as documented    Patient Active Problem List   Diagnosis     Essential hypertension     CARDIOVASCULAR SCREENING; LDL GOAL LESS THAN 160     Nonspecific abnormal results of liver function study     Advanced directives, counseling/discussion     Left hip pain     Moderate persistent asthma     Past Surgical History:   Procedure Laterality Date     ARTHROPLASTY HIP ANTERIOR Left 3/17/2016    Procedure:  ARTHROPLASTY HIP ANTERIOR;  Surgeon: Hilario Hicks MD;  Location: [facility]     BIOPSY      prostate     C NONSPECIFIC PROCEDURE      Left ACL and PCL repair     ORTHOPEDIC SURGERY  1980's    ACL and PCL repairs left knee       Social History   Substance Use Topics     Smoking status: Never Smoker     Smokeless tobacco: Never Used     Alcohol use 0.0 oz/week     0 Standard drinks or equivalent per week      Comment: 2 glasses wine day     Family History   Problem Relation Age of Onset     DIABETES Father      HEART DISEASE Father          Current Outpatient Prescriptions   Medication Sig Dispense Refill     amLODIPine (NORVASC) 5 MG tablet TAKE 1 TABLET ONCE DAILY 90 tablet 3     atenolol (TENORMIN) 100 MG tablet TAKE 1 TABLET ONCE A DAY 90 tablet 3     ipratropium (ATROVENT) 0.06 % spray Spray 2 sprays into both nostrils 4 times daily as needed for rhinitis 3 Box 3     albuterol (PROAIR HFA/PROVENTIL HFA/VENTOLIN HFA) 108 (90 BASE) MCG/ACT Inhaler Inhale 1-2 puffs into the lungs every 4 hours as needed for shortness of breath / dyspnea or wheezing 1 Inhaler 11     fluticasone-salmeterol (ADVAIR) 250-50 MCG/DOSE diskus inhaler Inhale 1 puff into the lungs 2 times daily 3 Inhaler 0     hydrALAZINE (APRESOLINE) 50 MG tablet Take 1 tablet (50 mg) by mouth 2 times daily 180 tablet 3     potassium chloride SA (POTASSIUM CHLORIDE) 20 MEQ CR tablet Take 1 tablet (20 mEq) by mouth daily 90 tablet 3     montelukast (SINGULAIR) 10 MG tablet TAKE 1 TABLET (10 MG) BY MOUTH AT BEDTIME 30 tablet 3     aspirin 81 MG tablet Take 81 mg by mouth daily       OMEPRAZOLE PO Take 40 mg by mouth every morning       Multiple Vitamins-Minerals (CENTRUM SILVER) per tablet Take 1 tablet by mouth daily       [DISCONTINUED] fluticasone-salmeterol (ADVAIR) 250-50 MCG/DOSE diskus inhaler Inhale 1 puff into the lungs 2 times daily 3 Inhaler 0     [DISCONTINUED] albuterol (PROAIR HFA/PROVENTIL HFA/VENTOLIN HFA) 108 (90 BASE) MCG/ACT Inhaler Inhale  "1-2 puffs into the lungs every 4 hours as needed for shortness of breath / dyspnea or wheezing 1 Inhaler 11     [DISCONTINUED] AMLODIPINE BESYLATE PO Take 10 mg by mouth       [DISCONTINUED] atenolol (TENORMIN) 100 MG tablet Take 1 tablet by mouth daily. 90 tablet 2     Allergies   Allergen Reactions     Amoxicillin Hives     hives     Lisinopril      Cough       Tetanus Toxoids      DPT as a child caused severe swelling of arm       Reviewed and updated as needed this visit by clinical staff       Reviewed and updated as needed this visit by Provider         ROS:  The fatigue and malaise and mental dullness that was associated with his low blood sodium level has improved since last visit he no longer has fevers chills and he denies weight loss. He describes runny nose, nasal congestion, intermittent mild sore throat without ear symptoms. He denies chest pains or palpitations, orthopnea, PND or edema. He has not had nausea, vomiting or abdominal pain.    OBJECTIVE:                                                    /88 (BP Location: Left arm, Patient Position: Chair, Cuff Size: Adult Large)  Pulse 89  Temp 97.3  F (36.3  C) (Oral)  Ht 6' 5\" (1.956 m)  Wt 213 lb 9.6 oz (96.9 kg)  SpO2 96%  BMI 25.33 kg/m2  Body mass index is 25.33 kg/(m^2).  GENERAL: healthy, alert and no distress  HENT: normal cephalic/atraumatic, ear canals and TM's normal, rhinorrhea clear and clear nasal discharge can be observed in the posterior pharynx  NECK: no adenopathy, no asymmetry, masses, or scars and thyroid normal to palpation  RESP: lungs clear to auscultation - no rales, rhonchi or wheezes  CV: regular rate and rhythm, normal S1 S2, no S3 or S4, no murmur, click or rub, no peripheral edema and peripheral pulses strong  MS: no gross musculoskeletal defects noted, no edema  NEURO: Normal strength and tone, mentation intact and speech normal  PSYCH: mentation appears normal, affect normal/bright       ASSESSMENT/PLAN:        "                                                     1. Essential hypertension  His blood pressure today shows adequate control despite stopping hydrochlorothiazide. Continue current medication which includes hydralazine, amlodipine and atenolol to control blood pressure. He was reminded of the difference between hydralazine and hydrochlorothiazide. His pharmacy recently asked me for refill of hydrochlorothiazide. I reminded him that he is not to take hydrochlorothiazide anymore.    2. Chronic rhinitis  I suspect that his current cough and nasal symptoms are related to rhinitis and he could try nasal Atrovent as below to help with symptoms  - ipratropium (ATROVENT) 0.06 % spray; Spray 2 sprays into both nostrils 4 times daily as needed for rhinitis  Dispense: 3 Box; Refill: 3    3. Moderate persistent asthma without complication  Continue current albuterol and Advair to control his newly diagnosed asthma  - albuterol (PROAIR HFA/PROVENTIL HFA/VENTOLIN HFA) 108 (90 BASE) MCG/ACT Inhaler; Inhale 1-2 puffs into the lungs every 4 hours as needed for shortness of breath / dyspnea or wheezing  Dispense: 1 Inhaler; Refill: 11  - fluticasone-salmeterol (ADVAIR) 250-50 MCG/DOSE diskus inhaler; Inhale 1 puff into the lungs 2 times daily  Dispense: 3 Inhaler; Refill: 0    4. Hyponatremia  Resolved after discontinuing hydrochlorothiazide    5. Hypokalemia  Resolved after discontinuing hydrochlorothiazide      Pneumovax today    FUTURE APPOINTMENTS:       - 6 months or sooner pending symptoms    Garrett Sotelo MD  [facility]

## 2017-04-14 NOTE — TELEPHONE ENCOUNTER
Pending Prescriptions:                       Disp   Refills    amLODIPine (NORVASC) 5 MG tablet [Pharmacy*90 tab*3        Sig: TAKE 1 TABLET ONCE DAILY    hydrochlorothiazide (HYDRODIURIL) 25 MG ta*90 tab*3        Sig: TAKE 1 TABLET DAILY IN THE MORNING    atenolol (TENORMIN) 100 MG tablet [Pharmac*90 tab*3        Sig: TAKE 1 TABLET ONCE A DAY    Amlodipine      Last Written Prescription Date: patient reported  Last Fill Quantity: na, # refills: na  Last Office Visit with [facility], [facility] or  [facility] prescribing provider: 03/22/2017  Next 5 appointments (look out 90 days)     Apr 14, 2017  2:30 PM CDT   [facility] with [facility]   [facility]    [address] [address] [phone]                   Potassium   Date Value Ref Range Status   04/07/2017 4.1 3.4 - 5.3 mmol/L Final     Creatinine   Date Value Ref Range Status   04/07/2017 0.65 (L) 0.66 - 1.25 mg/dL Final     BP Readings from Last 3 Encounters:   03/22/17 108/67   02/20/17 (!) 137/91   12/14/16 (!) 142/91     hydrochlorothiazide      Last Written Prescription Date: patient reported  Last Fill Quantity: na , # refills: na  Last Office Visit with [facility] or [facility] prescribing provider: 03/22/2017  Next 5 appointments (look out 90 days)     Apr 14, 2017  2:30 PM CDT   Office Visit with Garrett Sotelo MD   [facility]    [address] [address] [phone]                   Potassium   Date Value Ref Range Status   04/07/2017 4.1 3.4 - 5.3 mmol/L Final     Creatinine   Date Value Ref Range Status   04/07/2017 0.65 (L) 0.66 - 1.25 mg/dL Final     BP Readings from Last 3 Encounters:   03/22/17 108/67   02/20/17 (!) 137/91   12/14/16 (!) 142/91       Atenolol      Last Written Prescription Date: patient reported  Last Fill Quantity: na, # refills: na    Last Office Visit with [facility] or [facility] prescribing provider:  03/22/2017   Future  Office Visit:    Next 5 appointments (look out 90 days)     Apr 14, 2017  2:30 PM CDT   [facility]    [address] [facility] [address]   [phone]                    BP Readings from Last 3 Encounters:   03/22/17 108/67   02/20/17 (!) 137/91   12/14/16 (!) 142/91

## 2017-04-14 NOTE — MR AVS SNAPSHOT
"              After Visit Summary   4/14/2017    Alexandro Lee    MRN: [id number]           Patient Information     Date Of Birth          1950        Visit Information        Provider Department      4/14/2017 2:30 PM Garrett Sotelo MD [facility]        Today's Diagnoses     Essential hypertension    -  1    Chronic rhinitis        Moderate persistent asthma without complication        Hyponatremia        Hypokalemia        Need for vaccination           Follow-ups after your visit        Who to contact     If you have questions or need follow up information about today's clinic visit or your schedule please contact [facility] directly at [phone].  Normal or non-critical lab and imaging results will be communicated to you by MyChart, letter or phone within 4 business days after the clinic has received the results. If you do not hear from us within 7 days, please contact the clinic through MyChart or phone. If you have a critical or abnormal lab result, we will notify you by phone as soon as possible.  Submit refill requests through [employer] or call your pharmacy and they will forward the refill request to us. Please allow 3 business days for your refill to be completed.          Additional Information About Your Visit        Care EveryWhere ID     This is your Care EveryWhere ID. This could be used by other organizations to access your [place] medical records  [id number]        Your Vitals Were     Pulse Temperature Height Pulse Oximetry BMI (Body Mass Index)       89 97.3  F (36.3  C) (Oral) 6' 5\" (1.956 m) 96% 25.33 kg/m2        Blood Pressure from Last 3 Encounters:   04/14/17 138/88   03/22/17 108/67   02/20/17 (!) 137/91    Weight from Last 3 Encounters:   04/14/17 213 lb 9.6 oz (96.9 kg)   03/22/17 214 lb 12.8 oz (97.4 kg)   02/20/17 230 lb 8 oz (104.6 kg)              We Performed the Following     1st  Administration  [75396]     Pneumococcal vaccine 23 valent PPSV23  " (Pneumovax) [38959]          Today's Medication Changes          These changes are accurate as of: 4/14/17  5:35 PM.  If you have any questions, ask your nurse or doctor.               Start taking these medicines.        Dose/Directions    ipratropium 0.06 % spray   Commonly known as:  ATROVENT   Used for:  Chronic rhinitis   Started by:  Garrett Sotelo MD        Dose:  2 spray   Spray 2 sprays into both nostrils 4 times daily as needed for rhinitis   Quantity:  3 Box   Refills:  3         These medicines have changed or have updated prescriptions.        Dose/Directions    amLODIPine 5 MG tablet   Commonly known as:  NORVASC   This may have changed:  See the new instructions.   Used for:  Essential hypertension   Changed by:  Garrett Sotelo MD        TAKE 1 TABLET ONCE DAILY   Quantity:  90 tablet   Refills:  3       atenolol 100 MG tablet   Commonly known as:  TENORMIN   This may have changed:  See the new instructions.   Used for:  Essential hypertension   Changed by:  Garrett Sotelo MD        TAKE 1 TABLET ONCE A DAY   Quantity:  90 tablet   Refills:  3         Stop taking these medicines if you haven't already. Please contact your care team if you have questions.     hydrochlorothiazide 25 MG tablet   Commonly known as:  HYDRODIURIL   Stopped by:  Garrett Sotelo MD                Where to get your medicines      These medications were sent to  Pharmacy - [place], [place] - [address] [facility]  [address] [address]     Phone:  [phone]     albuterol 108 (90 BASE) MCG/ACT Inhaler    amLODIPine 5 MG tablet    atenolol 100 MG tablet    fluticasone-salmeterol 250-50 MCG/DOSE diskus inhaler         These medications were sent to [facility]/pharmacy #[id number] - [place], [place] - [address]  [address] [address]     Phone:  [phone]     ipratropium 0.06 % spray                Primary Care Provider Office Phone #  Fax #    Garrett Sotelo [phone]       [facility] [address] [address]        Thank you!     Thank you for choosing [facility]  for your care. Our goal is always to provide you with excellent care. Hearing back from our patients is one way we can continue to improve our services. Please take a few minutes to complete the written survey that you may receive in the mail after your visit with us. Thank you!             Your Updated Medication List - Protect others around you: Learn how to safely use, store and throw away your medicines at www.disposemymeds.org.          This list is accurate as of: 4/14/17  5:35 PM.  Always use your most recent med list.                   Brand Name Dispense Instructions for use    albuterol 108 (90 BASE) MCG/ACT Inhaler    PROAIR HFA/PROVENTIL HFA/VENTOLIN HFA    1 Inhaler    Inhale 1-2 puffs into the lungs every 4 hours as needed for shortness of breath / dyspnea or wheezing       amLODIPine 5 MG tablet    NORVASC    90 tablet    TAKE 1 TABLET ONCE DAILY       aspirin 81 MG tablet      Take 81 mg by mouth daily       atenolol 100 MG tablet    TENORMIN    90 tablet    TAKE 1 TABLET ONCE A DAY       CENTRUM SILVER per tablet      Take 1 tablet by mouth daily       fluticasone-salmeterol 250-50 MCG/DOSE diskus inhaler    ADVAIR    3 Inhaler    Inhale 1 puff into the lungs 2 times daily       hydrALAZINE 50 MG tablet    APRESOLINE    180 tablet    Take 1 tablet (50 mg) by mouth 2 times daily       ipratropium 0.06 % spray    ATROVENT    3 Box    Spray 2 sprays into both nostrils 4 times daily as needed for rhinitis       montelukast 10 MG tablet    SINGULAIR    30 tablet    TAKE 1 TABLET (10 MG) BY MOUTH AT BEDTIME       OMEPRAZOLE PO      Take 40 mg by mouth every morning       potassium chloride SA 20 MEQ CR tablet    potassium chloride    90 tablet    Take 1 tablet (20 mEq) by mouth daily

## 2017-05-08 DIAGNOSIS — J98.01 ACUTE BRONCHOSPASM: ICD-10-CM

## 2017-05-08 NOTE — TELEPHONE ENCOUNTER
ADVAIR DISKUS 250-50 MCG/DOSE diskus inhaler         Last Written Prescription Date: 4/14/2017  Last Fill Quantity: 3, # refills: 0    Last Office Visit with [facility], [facility] or [facility] prescribing provider:  4/14/2017   Future Office Visit:       Date of Last Asthma Action Plan Letter:   Asthma Action Plan Q1 Year    Topic Date Due     Asthma Action Plan - yearly  02/25/1955      Asthma Control Test:   ACT Total Scores 2/20/2017   ACT TOTAL SCORE (Goal Greater than or Equal to 20) 15   In the past 12 months, how many times did you visit the emergency room for your asthma without being admitted to the hospital? 0   In the past 12 months, how many times were you hospitalized overnight because of your asthma? 0       Date of Last Spirometry Test:   No results found for this or any previous visit.

## 2017-05-09 NOTE — TELEPHONE ENCOUNTER
Routing refill request to provider for review/approval because:  Labs out of range:  ACT less than 20    Medication pended.    Thank you    Kelly Mcghee RN

## 2017-05-10 DIAGNOSIS — J98.01 ACUTE BRONCHOSPASM: ICD-10-CM

## 2017-05-10 NOTE — TELEPHONE ENCOUNTER
Per fax from [facility] pharmacy - insurance requires this medication to be dispensed as a 90 day supply (3 inhalers).  Pended as such.    Pending Prescriptions:                       Disp   Refills    fluticasone-salmeterol (ADVAIR DISKUS) 25*3 Inha*0            Sig: Inhale 1 puff into the lungs 2 times daily           Last Written Prescription Date: 5-8-17 (one inhaler - rejected coverage by insurance)  Last Fill Quantity: 1, # refills: 0    [facility] or [facility] prescribing provider:  4-14-17   Future Office Visit:       Date of Last Asthma Action Plan Letter:   Asthma Action Plan Q1 Year    Topic Date Due     Asthma Action Plan - yearly  02/25/1955      Asthma Control Test:   ACT Total Scores 2/20/2017   ACT TOTAL SCORE (Goal Greater than or Equal to 20) 15   In the past 12 months, how many times did you visit the emergency room for your asthma without being admitted to the hospital? 0   In the past 12 months, how many times were you hospitalized overnight because of your asthma? 0       Date of Last Spirometry Test:   No results found for this or any previous visit.    Soco Gutiérrez RT (R)

## 2017-06-05 ENCOUNTER — TRANSFERRED RECORDS (OUTPATIENT)
Dept: HEALTH INFORMATION MANAGEMENT | Facility: CLINIC | Age: 67
End: 2017-06-05

## 2017-06-07 ENCOUNTER — TRANSFERRED RECORDS (OUTPATIENT)
Dept: HEALTH INFORMATION MANAGEMENT | Facility: CLINIC | Age: 67
End: 2017-06-07

## 2017-08-04 ENCOUNTER — TELEPHONE (OUTPATIENT)
Dept: FAMILY MEDICINE | Facility: CLINIC | Age: 67
End: 2017-08-04

## 2017-08-04 DIAGNOSIS — J45.901 REACTIVE AIRWAY DISEASE, UNSPECIFIED ASTHMA SEVERITY, WITH ACUTE EXACERBATION: ICD-10-CM

## 2017-08-04 RX ORDER — MONTELUKAST SODIUM 10 MG/1
TABLET ORAL
Qty: 90 TABLET | Refills: 1 | Status: SHIPPED | OUTPATIENT
Start: 2017-08-04 | End: 2018-01-04

## 2017-08-04 NOTE — TELEPHONE ENCOUNTER
"Returned call to patient.    Patient wants appointment with PCP (only)---preferably 8-14-17.  Doesn't want to wait until first available: 9-21-17.   OK to use \"same day\" appointment at 4:30 that day?     Patient reports \"asthma symptoms\" are improving with current meds--however VERY SLOWLY.    Patient states \"I'm not able to do anything!\".  Patient reports with most activity---even walking to his car, he begins to cough, wheeze, and become SOB.    Currently uses: Singulair, Adviar and Proair.      Please advise.     Dina CALDWELL RN,BSN      "

## 2017-08-04 NOTE — TELEPHONE ENCOUNTER
Reason for Call:  Other asthma    Detailed comments: Patient said his asthma symptoms are not improving as fast as they should.   He is requesting an appointment with Dr Sotelo, he feels it can be within the next two weeks. First available is 9/21. He declined offers of other providers.     Phone Number Patient can be reached at: Cell number on file:    Telephone Information:   Mobile [phone]       Best Time: any    Can we leave a detailed message on this number? YES    Call taken on 8/4/2017 at 11:17 AM by Mackenzie Hansen

## 2017-08-04 NOTE — TELEPHONE ENCOUNTER
montelukast (SINGULAIR) 10 MG tablet         Last Written Prescription Date: 2/20/2017  Last Fill Quantity: 30, # refills: 3    Last Office Visit with [facility], [facility] or [facility] prescribing provider:  4/14/2017   Future Office Visit:       Date of Last Asthma Action Plan Letter:   Asthma Action Plan Q1 Year    Topic Date Due     Asthma Action Plan - yearly  02/25/1955      Asthma Control Test:   ACT Total Scores 2/20/2017   ACT TOTAL SCORE (Goal Greater than or Equal to 20) 15   In the past 12 months, how many times did you visit the emergency room for your asthma without being admitted to the hospital? 0   In the past 12 months, how many times were you hospitalized overnight because of your asthma? 0       Date of Last Spirometry Test:   No results found for this or any previous visit.

## 2017-08-04 NOTE — TELEPHONE ENCOUNTER
Prescription approved per [facility] Refill Protocol.  Rula Solo RN

## 2017-08-07 DIAGNOSIS — J98.01 ACUTE BRONCHOSPASM: ICD-10-CM

## 2017-08-07 NOTE — TELEPHONE ENCOUNTER
Patient switching to mail order service, new Rx is needed  Only ONE inhaler pended, has future appointment in one week to address asthma.  Additional refills can be sent at that time or change in medication at that time.    Pending Prescriptions:                       Disp   Refills    fluticasone-salmeterol (ADVAIR DISKUS) 25*1 Inha*0            Sig: Inhale 1 puff into the lungs 2 times daily -           Additional refills after future office visit           Last Written Prescription Date: 5-10-17 local pharm  Last Fill Quantity: 3, # refills: 11    Last Office Visit with [facility] or [facility] prescribing provider:  4-14-17 Deepak   Future Office Visit:    Next 5 appointments (look out 90 days)     Aug 14, 2017  4:30 PM CDT   [facility]    [address] [address]   [phone]                   Date of Last Asthma Action Plan Letter:   Asthma Action Plan Q1 Year    Topic Date Due     Asthma Action Plan - yearly  02/25/1955      Asthma Control Test:   ACT Total Scores 2/20/2017   ACT TOTAL SCORE (Goal Greater than or Equal to 20) 15   In the past 12 months, how many times did you visit the emergency room for your asthma without being admitted to the hospital? 0   In the past 12 months, how many times were you hospitalized overnight because of your asthma? 0       Date of Last Spirometry Test:   No results found for this or any previous visit.      Soco Gutiérrez, RT (R)

## 2017-08-08 NOTE — TELEPHONE ENCOUNTER
Prescription approved per [facility] Refill Protocol.  Saundra DE LEON RN

## 2017-08-14 ENCOUNTER — OFFICE VISIT (OUTPATIENT)
Dept: FAMILY MEDICINE | Facility: CLINIC | Age: 67
End: 2017-08-14
Payer: MEDICARE

## 2017-08-14 VITALS
OXYGEN SATURATION: 96 % | HEIGHT: 77 IN | TEMPERATURE: 98.2 F | DIASTOLIC BLOOD PRESSURE: 88 MMHG | BODY MASS INDEX: 24.79 KG/M2 | SYSTOLIC BLOOD PRESSURE: 139 MMHG | HEART RATE: 75 BPM | WEIGHT: 210 LBS

## 2017-08-14 DIAGNOSIS — H10.45 CHRONIC ALLERGIC CONJUNCTIVITIS: ICD-10-CM

## 2017-08-14 DIAGNOSIS — J30.81 CHRONIC ALLERGIC RHINITIS DUE TO ANIMAL HAIR AND DANDER: Primary | ICD-10-CM

## 2017-08-14 PROCEDURE — 99213 OFFICE O/P EST LOW 20 MIN: CPT | Performed by: INTERNAL MEDICINE

## 2017-08-14 RX ORDER — FLUTICASONE PROPIONATE 50 MCG
1-2 SPRAY, SUSPENSION (ML) NASAL DAILY
Qty: 1 BOTTLE | Refills: 11 | Status: SHIPPED | OUTPATIENT
Start: 2017-08-14 | End: 2022-05-04

## 2017-08-14 RX ORDER — METHYLPREDNISOLONE 4 MG
TABLET, DOSE PACK ORAL
Qty: 21 TABLET | Refills: 0 | Status: SHIPPED | OUTPATIENT
Start: 2017-08-14 | End: 2018-12-31

## 2017-08-14 RX ORDER — CETIRIZINE HYDROCHLORIDE 10 MG/1
10 TABLET ORAL EVERY EVENING
Qty: 90 TABLET | Refills: 3 | Status: SHIPPED | OUTPATIENT
Start: 2017-08-14 | End: 2017-08-14

## 2017-08-14 RX ORDER — CETIRIZINE HYDROCHLORIDE 10 MG/1
10 TABLET ORAL EVERY EVENING
Qty: 90 TABLET | Refills: 3 | Status: SHIPPED | OUTPATIENT
Start: 2017-08-14 | End: 2018-01-04

## 2017-08-14 NOTE — MR AVS SNAPSHOT
After Visit Summary   8/14/2017    Alexandro Lee    MRN: [id number]           Patient Information     Date Of Birth          1950        Visit Information        Provider Department      8/14/2017 4:30 PM Garrett Sotelo MD [facility]        Today's Diagnoses     Chronic allergic rhinitis due to animal hair and dander    -  1    Chronic allergic conjunctivitis           Follow-ups after your visit        Additional Services     OTOLARYNGOLOGY REFERRAL       Your provider has referred you to: N: [facility] [place] [phone]   [email or web address]    Dr. Thanh Sen     Please be aware that coverage of these services is subject to the terms and limitations of your health insurance plan.  Call member services at your health plan with any benefit or coverage questions.      Please bring the following with you to your appointment:    (1) Any X-Rays, CTs or MRIs which have been performed.  Contact the facility where they were done to arrange for  prior to your scheduled appointment.   (2) List of current medications  (3) This referral request   (4) Any documents/labs given to you for this referral                  Follow-up notes from your care team     Return for See Dr. Sen if the meds don't help your symptoms.      Who to contact     If you have questions or need follow up information about today's clinic visit or your schedule please contact [facility] directly at [phone].  Normal or non-critical lab and imaging results will be communicated to you by MyChart, letter or phone within 4 business days after the clinic has received the results. If you do not hear from us within 7 days, please contact the clinic through MyChart or phone. If you have a critical or abnormal lab result, we will notify you by phone as soon as possible.  Submit refill requests through [employer] or call your pharmacy and they will forward the refill  "request to us. Please allow 3 business days for your refill to be completed.          Additional Information About Your Visit        [employer] Information     [employer] lets you send messages to your doctor, view your test results, renew your prescriptions, schedule appointments and more. To sign up, go to [email or web address] . Click on \"Log in\" on the left side of the screen, which will take you to the Welcome page. Then click on \"Sign up Now\" on the right side of the page.     You will be asked to enter the access code listed below, as well as some personal information. Please follow the directions to create your username and password.     Your access code is: [email or web address]  Expires: 2017  5:02 PM     Your access code will  in 90 days. If you need help or a new code, please call your [facility] clinic or [phone].        Care EveryWhere ID     This is your [facility] EveryWhere ID. This could be used by other organizations to access your [facility] medical records  [id number]        Your Vitals Were     Pulse Temperature Height Pulse Oximetry BMI (Body Mass Index)       75 98.2  F (36.8  C) (Tympanic) 6' 5\" (1.956 m) 96% 24.9 kg/m2        Blood Pressure from Last 3 Encounters:   17 139/88   17 138/88   17 108/67    Weight from Last 3 Encounters:   17 210 lb (95.3 kg)   17 213 lb 9.6 oz (96.9 kg)   17 214 lb 12.8 oz (97.4 kg)              We Performed the Following     OTOLARYNGOLOGY REFERRAL          Today's Medication Changes          These changes are accurate as of: 17  5:04 PM.  If you have any questions, ask your nurse or doctor.               Start taking these medicines.        Dose/Directions    cetirizine 10 MG tablet   Commonly known as:  zyrTEC   Used for:  Chronic allergic rhinitis due to animal hair and dander   Started by:  Garrett Sotelo MD        Dose:  10 mg   Take 1 tablet (10 mg) by mouth every evening   Quantity:  90 tablet   Refills:  3       " fluticasone 50 MCG/ACT spray   Commonly known as:  FLONASE   Used for:  Chronic allergic rhinitis due to animal hair and dander   Started by:  Garrett Sotelo MD        Dose:  1-2 spray   Spray 1-2 sprays into both nostrils daily   Quantity:  1 Bottle   Refills:  11       ketotifen 0.025 % Soln ophthalmic solution   Commonly known as:  ZADITOR   Used for:  Chronic allergic conjunctivitis   Started by:  Garrett Sotelo MD        Dose:  1 drop   Place 1 drop into both eyes every 12 hours   Quantity:  1 Bottle   Refills:  11       methylPREDNISolone 4 MG tablet   Commonly known as:  MEDROL DOSEPAK   Used for:  Chronic allergic rhinitis due to animal hair and dander   Started by:  Garrett Sotelo MD        Follow package instructions   Quantity:  21 tablet   Refills:  0            Where to get your medicines      These medications were sent to [facility]/pharmacy #[id number] - [place], [place] - [address] [facility]  [address] [facility] [address]     Phone:  [phone]     cetirizine 10 MG tablet    fluticasone 50 MCG/ACT spray    ketotifen 0.025 % Soln ophthalmic solution    methylPREDNISolone 4 MG tablet                Primary Care Provider Office Phone # Fax #    Garrett Sotelo [phone] [address]  [place] [address]        Equal Access to Services     [facility] Center[facility]SE ALBERTO AH: Hadii dex ku hadasho Soomaali, waaxda luqadaha, qaybta kaalmada adeegyada, waxay estelain hayrobbinn mahnaz dykes. So [facility] [phone].    ATENCIÓN: Si habla español, tiene a wynn disposición servicios gratuitos de asistencia lingüística. Llame al [phone].    We comply with applicable federal civil rights laws and [place] laws. We do not discriminate on the basis of race, color, national origin, age, disability sex, sexual orientation or gender identity.            Thank you!     Thank you for choosing [facility]  for your care. Our goal is always to provide you with excellent care. Hearing back from our  patients is one way we can continue to improve our services. Please take a few minutes to complete the written survey that you may receive in the mail after your visit with us. Thank you!             Your Updated Medication List - Protect others around you: Learn how to safely use, store and throw away your medicines at www.disposemymeds.org.          This list is accurate as of: 8/14/17  5:04 PM.  Always use your most recent med list.                   Brand Name Dispense Instructions for use Diagnosis    albuterol 108 (90 BASE) MCG/ACT Inhaler    PROAIR HFA/PROVENTIL HFA/VENTOLIN HFA    1 Inhaler    Inhale 1-2 puffs into the lungs every 4 hours as needed for shortness of breath / dyspnea or wheezing    Moderate persistent asthma without complication       amLODIPine 5 MG tablet    NORVASC    90 tablet    TAKE 1 TABLET ONCE DAILY    Essential hypertension       aspirin 81 MG tablet      Take 81 mg by mouth daily        atenolol 100 MG tablet    TENORMIN    90 tablet    TAKE 1 TABLET ONCE A DAY    Essential hypertension       CENTRUM SILVER per tablet      Take 1 tablet by mouth daily        cetirizine 10 MG tablet    zyrTEC    90 tablet    Take 1 tablet (10 mg) by mouth every evening    Chronic allergic rhinitis due to animal hair and dander       cholecalciferol 1000 UNITS capsule    vitamin  -D     Take 1 capsule by mouth daily        fluticasone 50 MCG/ACT spray    FLONASE    1 Bottle    Spray 1-2 sprays into both nostrils daily    Chronic allergic rhinitis due to animal hair and dander       fluticasone-salmeterol 250-50 MCG/DOSE diskus inhaler    ADVAIR DISKUS    3 Inhaler    Inhale 1 puff into the lungs 2 times daily - Additional refills after future office visit    Acute bronchospasm       hydrALAZINE 50 MG tablet    APRESOLINE    180 tablet    Take 1 tablet (50 mg) by mouth 2 times daily    Benign essential hypertension       ketotifen 0.025 % Soln ophthalmic solution    ZADITOR    1 Bottle    Place 1 drop  into both eyes every 12 hours    Chronic allergic conjunctivitis       methylPREDNISolone 4 MG tablet    MEDROL DOSEPAK    21 tablet    Follow package instructions    Chronic allergic rhinitis due to animal hair and dander       montelukast 10 MG tablet    SINGULAIR    90 tablet    TAKE 1 TABLET (10 MG) BY MOUTH AT BEDTIME    Reactive airway disease, unspecified asthma severity, with acute exacerbation       OMEPRAZOLE PO      Take 20 mg by mouth as needed

## 2017-08-14 NOTE — NURSING NOTE
"Chief Complaint   Patient presents with     Asthma       Initial /88 (BP Location: Right arm, Cuff Size: Adult Large)  Pulse 75  Temp 98.2  F (36.8  C) (Tympanic)  Ht 6' 5\" (1.956 m)  Wt 210 lb (95.3 kg)  SpO2 96%  BMI 24.9 kg/m2 Estimated body mass index is 24.9 kg/(m^2) as calculated from the following:    Height as of this encounter: 6' 5\" (1.956 m).    Weight as of this encounter: 210 lb (95.3 kg).  Medication Reconciliation: complete   Renetta Rivera MA       "

## 2017-08-14 NOTE — PROGRESS NOTES
SUBJECTIVE:                                                    Alexandro Lee is a 67 year old male who presents to clinic today for the following health issues:    Asthma Follow-Up    Was ACT completed today?    Yes    ACT Total Scores 8/14/2017   ACT TOTAL SCORE (Goal Greater than or Equal to 20) 14   In the past 12 months, how many times did you visit the emergency room for your asthma without being admitted to the hospital? 0   In the past 12 months, how many times were you hospitalized overnight because of your asthma? 0       Recent asthma triggers that patient is dealing with: upper respiratory infections, mold, Patient is unaware of triggers and with activity (after getting up in the morning and getting going, after meals    Amount of exercise or physical activity: None    Problems taking medications regularly: No    Medication side effects: none  Diet: regular (no restrictions)      67-year-old man recently diagnosed with asthma. After treatments for asthma, wheezing and shortness of breath symptoms have been well-controlled. However, he continues to have nasal congestion, rhinorrhea, postnasal drainage which results in severe coughing fits. These coughing fits often preclude him from being physically active. He denies associated fevers, chills, weight loss that is unintentional, hemoptysis, night sweats. He has not tried any over-the-counter antihistamines, or nasal steroids. He was prescribed nasal ipratropium several months ago and after several doses stopped using it because he felt that it was not helping him. Also of note, he is on allergist several months ago who did skin testing and found no significant potential allergens.    Problem list and histories reviewed & adjusted, as indicated.  Additional history: as documented    Patient Active Problem List   Diagnosis     Essential hypertension     CARDIOVASCULAR SCREENING; LDL GOAL LESS THAN 160     Nonspecific abnormal results of liver function  study     Advanced directives, counseling/discussion     Left hip pain     Moderate persistent asthma     Past Surgical History:   Procedure Laterality Date     ARTHROPLASTY HIP ANTERIOR Left 3/17/2016    Procedure: ARTHROPLASTY HIP ANTERIOR;  Surgeon: Hilario Hicks MD;  Location: [facility] OR     BIOPSY      prostate     C NONSPECIFIC PROCEDURE      Left ACL and PCL repair     ORTHOPEDIC SURGERY  1980's    ACL and PCL repairs left knee       Social History   Substance Use Topics     Smoking status: Never Smoker     Smokeless tobacco: Never Used     Alcohol use 0.0 oz/week     0 Standard drinks or equivalent per week      Comment: 2 glasses wine day     Family History   Problem Relation Age of Onset     DIABETES Father      HEART DISEASE Father          Current Outpatient Prescriptions   Medication Sig Dispense Refill     cholecalciferol (VITAMIN  -D) 1000 UNITS capsule Take 1 capsule by mouth daily       fluticasone (FLONASE) 50 MCG/ACT spray Spray 1-2 sprays into both nostrils daily 1 Bottle 11     ketotifen (ZADITOR) 0.025 % SOLN ophthalmic solution Place 1 drop into both eyes every 12 hours 1 Bottle 11     methylPREDNISolone (MEDROL DOSEPAK) 4 MG tablet Follow package instructions 21 tablet 0     cetirizine (ZYRTEC) 10 MG tablet Take 1 tablet (10 mg) by mouth every evening 90 tablet 3     fluticasone-salmeterol (ADVAIR DISKUS) 250-50 MCG/DOSE diskus inhaler Inhale 1 puff into the lungs 2 times daily - Additional refills after future office visit 3 Inhaler 0     montelukast (SINGULAIR) 10 MG tablet TAKE 1 TABLET (10 MG) BY MOUTH AT BEDTIME 90 tablet 1     amLODIPine (NORVASC) 5 MG tablet TAKE 1 TABLET ONCE DAILY 90 tablet 3     atenolol (TENORMIN) 100 MG tablet TAKE 1 TABLET ONCE A DAY 90 tablet 3     albuterol (PROAIR HFA/PROVENTIL HFA/VENTOLIN HFA) 108 (90 BASE) MCG/ACT Inhaler Inhale 1-2 puffs into the lungs every 4 hours as needed for shortness of breath / dyspnea or wheezing 1 Inhaler 11     hydrALAZINE  "(APRESOLINE) 50 MG tablet Take 1 tablet (50 mg) by mouth 2 times daily 180 tablet 3     aspirin 81 MG tablet Take 81 mg by mouth daily       OMEPRAZOLE PO Take 20 mg by mouth as needed        Multiple Vitamins-Minerals (CENTRUM SILVER) per tablet Take 1 tablet by mouth daily       Allergies   Allergen Reactions     Amoxicillin Hives     hives     Lisinopril      Cough       Tetanus Toxoids      DPT as a child caused severe swelling of arm         ROS:  Constitutional, HEENT, cardiovascular, pulmonary, gi and gu systems are negative, except as otherwise noted.      OBJECTIVE:   /88 (BP Location: Right arm, Cuff Size: Adult Large)  Pulse 75  Temp 98.2  F (36.8  C) (Tympanic)  Ht 6' 5\" (1.956 m)  Wt 210 lb (95.3 kg)  SpO2 96%  BMI 24.9 kg/m2  Body mass index is 24.9 kg/(m^2).  GENERAL: healthy, alert and no distress  EYES: Mild conjunctival erythema and hyperemia, pupils equal round reactive to light, extraocular movements intact  HENT: Bilateral TMs normal; nasal mucosa erythematous and edematous with yellow nasal discharge, this discharge can be observe in the posterior pharynx   RESP: lungs clear to auscultation - no rales, rhonchi or wheezes  CV: Heart with regular rate and rhythm.   NEURO: Normal strength and tone, mentation intact and speech normal  PSYCH: mentation appears normal, affect normal/bright    Diagnostic Test Results:  none     ASSESSMENT/PLAN:       1. Chronic allergic rhinitis due to animal hair and dander    - fluticasone (FLONASE) 50 MCG/ACT spray; Spray 1-2 sprays into both nostrils daily  Dispense: 1 Bottle; Refill: 11  - OTOLARYNGOLOGY REFERRAL  - methylPREDNISolone (MEDROL DOSEPAK) 4 MG tablet; Follow package instructions  Dispense: 21 tablet; Refill: 0  - cetirizine (ZYRTEC) 10 MG tablet; Take 1 tablet (10 mg) by mouth every evening  Dispense: 90 tablet; Refill: 3    2. Chronic allergic conjunctivitis    - ketotifen (ZADITOR) 0.025 % SOLN ophthalmic solution; Place 1 drop into " both eyes every 12 hours  Dispense: 1 Bottle; Refill: 11      I think his asthma is well controlled and that his cough is being triggered by severe post nasal drainage from uncontrolled allergic rhinitis, however, if above does not help enough, then see ENT to exclude nasal polyposis, chronic sinus infection etc.        FUTURE APPOINTMENTS:       - ENT if above does not help symptoms enough after 2-3 weeks; he will need to see me for physical as soon as convenient which will give us an opportunity to follow up on these symptoms and recheck the ACT after he gets his rhinitis symptoms better controlled     Garrett Sotelo MD  [facility]

## 2017-08-15 ASSESSMENT — ASTHMA QUESTIONNAIRES: ACT_TOTALSCORE: 14

## 2017-09-28 ENCOUNTER — TRANSFERRED RECORDS (OUTPATIENT)
Dept: HEALTH INFORMATION MANAGEMENT | Facility: CLINIC | Age: 67
End: 2017-09-28

## 2017-10-30 ENCOUNTER — TELEPHONE (OUTPATIENT)
Dept: FAMILY MEDICINE | Facility: CLINIC | Age: 67
End: 2017-10-30

## 2017-10-30 DIAGNOSIS — J98.01 ACUTE BRONCHOSPASM: ICD-10-CM

## 2017-10-30 NOTE — TELEPHONE ENCOUNTER
fluticasone-salmeterol (ADVAIR DISKUS) 250-50 MCG/DOSE diskus inhaler 3 Inhaler 0 8/8/2017           Last Written Prescription Date:  8/8/17  Last Fill Quantity: 3,   # refills: 0  Future Office visit:       Routing refill request to provider for review/approval because:  Drug not on the [facility], P or [facility] refill protocol or controlled substance

## 2018-01-04 DIAGNOSIS — J98.01 ACUTE BRONCHOSPASM: ICD-10-CM

## 2018-01-04 DIAGNOSIS — J45.40 MODERATE PERSISTENT ASTHMA WITHOUT COMPLICATION: ICD-10-CM

## 2018-01-04 DIAGNOSIS — J30.81 CHRONIC ALLERGIC RHINITIS DUE TO ANIMAL HAIR AND DANDER: ICD-10-CM

## 2018-01-04 DIAGNOSIS — J45.901 REACTIVE AIRWAY DISEASE, UNSPECIFIED ASTHMA SEVERITY, WITH ACUTE EXACERBATION: ICD-10-CM

## 2018-01-04 DIAGNOSIS — I10 ESSENTIAL HYPERTENSION: ICD-10-CM

## 2018-01-04 DIAGNOSIS — I10 BENIGN ESSENTIAL HYPERTENSION: ICD-10-CM

## 2018-01-04 NOTE — TELEPHONE ENCOUNTER
Many of the meds refilled at local [facility] last August for  Wither 6 months or 1 year when in for last appt 8-14-17 .  I pended 3 month with 1 additional refill     I can detail out if need. Route back

## 2018-01-04 NOTE — TELEPHONE ENCOUNTER
[facility]'s [phone].  ID# [id number]  Pt's insurance changed and needs the following transferred:  1. Proair  2. Advair  3.Zyrtec  4. Singulair  5. Amlodipine  6. Atenolol  7. Hydralazine    Pt may be reached at [phone]

## 2018-01-05 RX ORDER — MONTELUKAST SODIUM 10 MG/1
TABLET ORAL
Qty: 90 TABLET | Refills: 11 | Status: SHIPPED | OUTPATIENT
Start: 2018-01-05 | End: 2018-12-31

## 2018-01-05 RX ORDER — HYDRALAZINE HYDROCHLORIDE 50 MG/1
50 TABLET, FILM COATED ORAL 2 TIMES DAILY
Qty: 180 TABLET | Refills: 3 | Status: SHIPPED | OUTPATIENT
Start: 2018-01-05 | End: 2018-12-31

## 2018-01-05 RX ORDER — ALBUTEROL SULFATE 90 UG/1
1-2 AEROSOL, METERED RESPIRATORY (INHALATION) EVERY 4 HOURS PRN
Qty: 3 INHALER | Refills: 11 | Status: SHIPPED | OUTPATIENT
Start: 2018-01-05 | End: 2019-11-26

## 2018-01-05 RX ORDER — CETIRIZINE HYDROCHLORIDE 10 MG/1
10 TABLET ORAL EVERY EVENING
Qty: 90 TABLET | Refills: 11 | Status: SHIPPED | OUTPATIENT
Start: 2018-01-05 | End: 2024-03-19

## 2018-01-05 RX ORDER — ATENOLOL 100 MG/1
TABLET ORAL
Qty: 90 TABLET | Refills: 3 | Status: SHIPPED | OUTPATIENT
Start: 2018-01-05 | End: 2018-12-31

## 2018-01-05 RX ORDER — AMLODIPINE BESYLATE 5 MG/1
TABLET ORAL
Qty: 90 TABLET | Refills: 3 | Status: SHIPPED | OUTPATIENT
Start: 2018-01-05 | End: 2018-07-31

## 2018-01-05 NOTE — TELEPHONE ENCOUNTER
Dr Sotelo,  Patient due for yearly appointment April  Several below meds for asthma - last ACt below goal of 20 (8/14/2017, score: 14)  Pended refills for 3 months for the below 7 meds to new mail order  Please authorize if appropriate.  Thanks,  Saundra DE LEON RN

## 2018-01-28 DIAGNOSIS — J45.901 REACTIVE AIRWAY DISEASE, UNSPECIFIED ASTHMA SEVERITY, WITH ACUTE EXACERBATION: ICD-10-CM

## 2018-01-30 RX ORDER — MONTELUKAST SODIUM 10 MG/1
TABLET ORAL
Start: 2018-01-30

## 2018-03-22 ENCOUNTER — TRANSFERRED RECORDS (OUTPATIENT)
Dept: HEALTH INFORMATION MANAGEMENT | Facility: CLINIC | Age: 68
End: 2018-03-22

## 2018-07-25 ENCOUNTER — TELEPHONE (OUTPATIENT)
Dept: FAMILY MEDICINE | Facility: CLINIC | Age: 68
End: 2018-07-25

## 2018-07-25 DIAGNOSIS — I10 ESSENTIAL HYPERTENSION: Primary | ICD-10-CM

## 2018-07-25 NOTE — TELEPHONE ENCOUNTER
Patient requesting to stop taking amlodipine on a trial basis to see if SOB and cough stop    Returned patient call.  Patient is requesting to stop taking amlodipine on a trial basis.  Patient states he becomes SOB within 5-15 minutes of activity and is unable to work outside, patient will sit down for about 5 minutes to catch breath.  Patient stated he will have  3-4 cough drops daily to help with the cough.  Patient stated the cough can be  productive sometimes but  has to work at it and the mucus will be green in the morning.    Patient unable to come in for appointment in the next few weeks, due to going to out of town wedding.      Patient states he read the side effects and feels this medication is causing his symptoms and would like to stop taking medication on a trial basis and monitor BP at home.  Patient can take BP everyday at home with cuff.      Patient denies CP, blue lips/tongue, severe SOB, wheezing, inability to speak, drooling or inability to swallow, difficulty taking a deep breath due to pain.    BP Readings from Last 3 Encounters:   08/14/17 139/88   04/14/17 138/88   03/22/17 108/67       Advised patient to not stop taking medication until provider has reviewed request.  Advised patient to seek emergency care if symptoms worsen.  Please review and advise.      Aviva Hernandez RN  [employer] [facility]

## 2018-07-25 NOTE — TELEPHONE ENCOUNTER
Amlodipine does not typically cause cough or dyspnea  I would not advise stopping that drug   I would advise an office visit appointment when convenient to further explore those symptoms

## 2018-07-25 NOTE — TELEPHONE ENCOUNTER
"Pt advised of PCP's recommendations. Pt reports that \"the intranet\" says that amlodipine can cause a cough. \" I am leaving in 2 days for [place]. My daughter is getting \" \"I am going to stop the amlodipine but I will monitor my blood pressure regularly.\" \"If my blood pressure starts to go up, then I will take amlodipine again.\" \"I will call and schedule an appointment with  when I return.\"  "

## 2018-07-31 RX ORDER — FELODIPINE 5 MG/1
5 TABLET, EXTENDED RELEASE ORAL DAILY
Qty: 90 TABLET | Refills: 3 | Status: SHIPPED | OUTPATIENT
Start: 2018-07-31 | End: 2018-12-31

## 2018-07-31 NOTE — TELEPHONE ENCOUNTER
Called and spoke to pt. Pt states that he is in [facility] for about a week. Pt would like the script sent to [facility] in [place] at [phone]. Called and spoke with Pharmacist at [facility]. Pharmacist will call and transfer the full script to [facility] in [place]. Called and spoke to pt again. Pt advised and instructed to let the pharmacist at [facility] know that after he picks up his 90 day script that he wants the rest of the script sent to his preferred mail order pharmacy for future refills. Pt understood.

## 2018-07-31 NOTE — TELEPHONE ENCOUNTER
See below, pt asking for changed bp medication from Amlodipine.  Would you want to prescribe something with a 2 week in clinic f/u?    Teresita Cutler RN

## 2018-12-13 ENCOUNTER — HOSPITAL LABORATORY (OUTPATIENT)
Dept: OTHER | Facility: CLINIC | Age: 68
End: 2018-12-13

## 2018-12-13 ENCOUNTER — TRANSFERRED RECORDS (OUTPATIENT)
Dept: HEALTH INFORMATION MANAGEMENT | Facility: CLINIC | Age: 68
End: 2018-12-13

## 2018-12-31 ENCOUNTER — OFFICE VISIT (OUTPATIENT)
Dept: FAMILY MEDICINE | Facility: CLINIC | Age: 68
End: 2018-12-31
Payer: MEDICARE

## 2018-12-31 VITALS
BODY MASS INDEX: 25.27 KG/M2 | OXYGEN SATURATION: 96 % | HEIGHT: 77 IN | TEMPERATURE: 98 F | WEIGHT: 214 LBS | DIASTOLIC BLOOD PRESSURE: 84 MMHG | HEART RATE: 78 BPM | SYSTOLIC BLOOD PRESSURE: 135 MMHG

## 2018-12-31 DIAGNOSIS — I10 BENIGN ESSENTIAL HYPERTENSION: ICD-10-CM

## 2018-12-31 DIAGNOSIS — J45.40 MODERATE PERSISTENT ASTHMA WITHOUT COMPLICATION: ICD-10-CM

## 2018-12-31 DIAGNOSIS — E78.5 HYPERLIPIDEMIA LDL GOAL <100: ICD-10-CM

## 2018-12-31 DIAGNOSIS — J32.0 CHRONIC MAXILLARY SINUSITIS: Primary | ICD-10-CM

## 2018-12-31 DIAGNOSIS — K74.69 OTHER CIRRHOSIS OF LIVER (H): ICD-10-CM

## 2018-12-31 DIAGNOSIS — R05.3 CHRONIC COUGH: ICD-10-CM

## 2018-12-31 LAB
ERYTHROCYTE [DISTWIDTH] IN BLOOD BY AUTOMATED COUNT: 13.8 % (ref 10–15)
HCT VFR BLD AUTO: 47.1 % (ref 40–53)
HGB BLD-MCNC: 16 G/DL (ref 13.3–17.7)
MCH RBC QN AUTO: 30.3 PG (ref 26.5–33)
MCHC RBC AUTO-ENTMCNC: 34 G/DL (ref 31.5–36.5)
MCV RBC AUTO: 89 FL (ref 78–100)
PLATELET # BLD AUTO: 194 10E9/L (ref 150–450)
RBC # BLD AUTO: 5.28 10E12/L (ref 4.4–5.9)
WBC # BLD AUTO: 5.6 10E9/L (ref 4–11)

## 2018-12-31 PROCEDURE — 80061 LIPID PANEL: CPT | Performed by: INTERNAL MEDICINE

## 2018-12-31 PROCEDURE — 99214 OFFICE O/P EST MOD 30 MIN: CPT | Performed by: INTERNAL MEDICINE

## 2018-12-31 PROCEDURE — 85027 COMPLETE CBC AUTOMATED: CPT | Performed by: INTERNAL MEDICINE

## 2018-12-31 PROCEDURE — 36415 COLL VENOUS BLD VENIPUNCTURE: CPT | Performed by: INTERNAL MEDICINE

## 2018-12-31 PROCEDURE — 80053 COMPREHEN METABOLIC PANEL: CPT | Performed by: INTERNAL MEDICINE

## 2018-12-31 RX ORDER — HYDRALAZINE HYDROCHLORIDE 50 MG/1
50 TABLET, FILM COATED ORAL 2 TIMES DAILY
Qty: 180 TABLET | Refills: 3 | Status: SHIPPED | OUTPATIENT
Start: 2018-12-31 | End: 2019-12-20

## 2018-12-31 RX ORDER — MONTELUKAST SODIUM 10 MG/1
TABLET ORAL
Qty: 90 TABLET | Refills: 11 | Status: SHIPPED | OUTPATIENT
Start: 2018-12-31 | End: 2021-01-20

## 2018-12-31 RX ORDER — FELODIPINE 5 MG/1
5 TABLET, EXTENDED RELEASE ORAL DAILY
Qty: 90 TABLET | Refills: 3 | Status: SHIPPED | OUTPATIENT
Start: 2018-12-31 | End: 2019-12-20

## 2018-12-31 RX ORDER — ATENOLOL 100 MG/1
TABLET ORAL
Qty: 90 TABLET | Refills: 3 | Status: SHIPPED | OUTPATIENT
Start: 2018-12-31 | End: 2019-12-20

## 2018-12-31 ASSESSMENT — MIFFLIN-ST. JEOR: SCORE: 1863.08

## 2018-12-31 NOTE — LETTER
"[place] [address] [facility]  [address]  [place]  [address]  Tel: [phone]    January 2, 2019    Alexandro Lee  [address] [facility] [address]        Dear Mr. Lee,    The following letter pertains to your most recent diagnostic tests:    -Liver and gallbladder tests are normal for you. (ALT,AST, Alk phos, bilirubin), kidney function is normal for you (Creatinine, GFR), Sodium is normal, Potassium is normal for you, Calcium is normal for you, Glucose (blood sugar) is normal for you.        -Your total cholesterol is 206 which is just above your goal of total cholesterol less than 200.  This is primarily because you have very high levels of HDL \"good\" cholesterol.       -Your triglycerides are 90 which are at your goal of triglycerides less than 150.    -Your HDL or \"good cholesterol\" is 74 which is at your goal of HDL cholesterol greater than 40.    -Your LDL cholesterol or \"bad cholesterol\" is 114 which is at your goal of LDL cholesterol less than <130.  Your LDL goal is based on your risk factors for artery disease.     -Your complete blood counts including your hemoglobin returned normal for you.       Bottom line:  Your lab result look stable.      Follow up:  Schedule an appointment for a physical examination with fasting blood tests in one year's time, or return sooner if new questions, symptoms or problems arise.     Sincerely,    Garrett Sotelo MD/[facility]          Enclosure: Lab Results  Results for orders placed or performed in visit on 12/31/18   Comprehensive metabolic panel   Result Value Ref Range    Sodium 132 (L) 133 - 144 mmol/L    Potassium 4.4 3.4 - 5.3 mmol/L    Chloride 98 94 - 109 mmol/L    Carbon Dioxide 29 20 - 32 mmol/L    Anion Gap 5 3 - 14 mmol/L    Glucose 100 (H) 70 - 99 mg/dL    Urea Nitrogen 9 7 - 30 mg/dL    Creatinine 0.76 0.66 - 1.25 mg/dL    GFR Estimate >90 >60 mL/min/[1.73_m2]    GFR Estimate If Black >90 >60 mL/min/[1.73_m2]    Calcium 9.9 8.5 - 10.1 " mg/dL    Bilirubin Total 0.6 0.2 - 1.3 mg/dL    Albumin 4.4 3.4 - 5.0 g/dL    Protein Total 8.6 6.8 - 8.8 g/dL    Alkaline Phosphatase 81 40 - 150 U/L    ALT 51 0 - 70 U/L    AST 39 0 - 45 U/L   CBC with platelets   Result Value Ref Range    WBC 5.6 4.0 - 11.0 10e9/L    RBC Count 5.28 4.4 - 5.9 10e12/L    Hemoglobin 16.0 13.3 - 17.7 g/dL    Hematocrit 47.1 40.0 - 53.0 %    MCV 89 78 - 100 fl    MCH 30.3 26.5 - 33.0 pg    MCHC 34.0 31.5 - 36.5 g/dL    RDW 13.8 10.0 - 15.0 %    Platelet Count 194 150 - 450 10e9/L   Lipid panel reflex to direct LDL Fasting   Result Value Ref Range    Cholesterol 206 (H) <200 mg/dL    Triglycerides 90 <150 mg/dL    HDL Cholesterol 74 >39 mg/dL    LDL Cholesterol Calculated 114 (H) <100 mg/dL    Non HDL Cholesterol 132 (H) <130 mg/dL

## 2018-12-31 NOTE — PROGRESS NOTES
SUBJECTIVE:   Alexandro Lee is a 68 year old male who presents to clinic today for the following health issues:      RESPIRATORY SYMPTOMS      Duration: Chronic - (2 years)    Description  cough and sinus drainage down back of the throat    Severity: mild to moderate    Accompanying signs and symptoms: None    History (predisposing factors):  asthma    Precipitating or alleviating factors: None    Therapies tried and outcome:  oral decongestant nasal spray/wash - flonase      He would also like a refill of his medications today - last OV was 8/14/2017    Hypertension Follow-up      Outpatient blood pressures are being checked at home.  Results are 120's.    Low Salt Diet: no added salt    Asthma Follow-Up    Was ACT completed today?    Yes    ACT Total Scores 12/31/2018   ACT TOTAL SCORE (Goal Greater than or Equal to 20) 21   In the past 12 months, how many times did you visit the emergency room for your asthma without being admitted to the hospital? 0   In the past 12 months, how many times were you hospitalized overnight because of your asthma? 0       Recent asthma triggers that patient is dealing with: exercise or sports        Problem list and histories reviewed & adjusted, as indicated.  Additional history: as documented    Patient Active Problem List   Diagnosis     Essential hypertension     CARDIOVASCULAR SCREENING; LDL GOAL LESS THAN 160     Nonspecific abnormal results of liver function study     Advanced directives, counseling/discussion     Left hip pain     Moderate persistent asthma     Past Surgical History:   Procedure Laterality Date     ARTHROPLASTY HIP ANTERIOR Left 3/17/2016    Procedure: ARTHROPLASTY HIP ANTERIOR;  Surgeon: Hilario Hicks MD;  Location: [facility]     BIOPSY      prostate     C NONSPECIFIC PROCEDURE      Left ACL and PCL repair     ORTHOPEDIC SURGERY  1980's    ACL and PCL repairs left knee       Social History     Tobacco Use     Smoking status: Never Smoker     Smokeless  tobacco: Never Used   Substance Use Topics     Alcohol use: Yes     Alcohol/week: 0.0 oz     Comment: 2 glasses wine day     Family History   Problem Relation Age of Onset     Diabetes Father      Heart Disease Father          Current Outpatient Medications   Medication Sig Dispense Refill     albuterol (PROAIR HFA/PROVENTIL HFA/VENTOLIN HFA) 108 (90 BASE) MCG/ACT Inhaler Inhale 1-2 puffs into the lungs every 4 hours as needed for shortness of breath / dyspnea or wheezing 3 Inhaler 11     aspirin 81 MG tablet Take 81 mg by mouth daily       atenolol (TENORMIN) 100 MG tablet TAKE 1 TABLET ONCE A DAY 90 tablet 3     cetirizine (ZYRTEC) 10 MG tablet Take 1 tablet (10 mg) by mouth every evening 90 tablet 11     cholecalciferol (VITAMIN  -D) 1000 UNITS capsule Take 1 capsule by mouth daily       felodipine ER (PLENDIL) 5 MG 24 hr tablet Take 1 tablet (5 mg) by mouth daily 90 tablet 3     fluticasone (FLONASE) 50 MCG/ACT spray Spray 1-2 sprays into both nostrils daily 1 Bottle 11     fluticasone-salmeterol (ADVAIR DISKUS) 250-50 MCG/DOSE diskus inhaler Inhale 1 puff into the lungs 2 times daily - Additional refills after future office visit 3 Inhaler 11     hydrALAZINE (APRESOLINE) 50 MG tablet Take 1 tablet (50 mg) by mouth 2 times daily 180 tablet 3     montelukast (SINGULAIR) 10 MG tablet TAKE 1 TABLET (10 MG) BY MOUTH AT BEDTIME 90 tablet 11     Multiple Vitamins-Minerals (CENTRUM SILVER) per tablet Take 1 tablet by mouth daily       OMEPRAZOLE PO Take 20 mg by mouth as needed        Allergies   Allergen Reactions     Amoxicillin Hives     hives     Lisinopril      Cough       Tetanus Toxoids      DPT as a child caused severe swelling of arm       Reviewed and updated as needed this visit by clinical staff       Reviewed and updated as needed this visit by Provider         ROS:  Constitutional, HEENT, cardiovascular, pulmonary, gi and gu systems are negative, except as otherwise noted.    OBJECTIVE:     /84 (BP  "Location: Right arm, Cuff Size: Adult Large)   Pulse 78   Temp 98  F (36.7  C) (Tympanic)   Ht 1.956 m (6' 5\")   Wt 97.1 kg (214 lb)   SpO2 96%   BMI 25.38 kg/m    Body mass index is 25.38 kg/m .  GENERAL: healthy, alert and no distress  NECK: no adenopathy, no asymmetry, masses, or scars and thyroid normal to palpation  RESP: lungs clear to auscultation - no rales, rhonchi or wheezes  CV: regular rate and rhythm, normal S1 S2, no S3 or S4, no murmur, click or rub, no peripheral edema and peripheral pulses strong  ABDOMEN: soft, nontender, no hepatosplenomegaly, no masses and bowel sounds normal  MS: no gross musculoskeletal defects noted, no edema  NEURO: Normal strength and tone, mentation intact and speech normal  PSYCH: mentation appears normal, affect normal/bright    Diagnostic Test Results:  Labs pending     ASSESSMENT/PLAN:       1. Chronic maxillary sinusitis  Continue course of antibiotics prescribed by otolaryngology.    2. Moderate persistent asthma without complication  Continue current bronchodilators and inhaled steroids    3. Hyperlipidemia LDL goal <100  Recheck lipids as they have not been checked for nearly 5 years  - Comprehensive metabolic panel  - CBC with platelets  - Lipid panel reflex to direct LDL Fasting    4. Other cirrhosis of liver (H)  Continue follow-up with [place] [facility] with surveillance ultrasounds.  Discussed avoiding alcohol.  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    5. Chronic cough  This is been a quality of life limiting problem for more than 2 years.  He has had a CT scan of the chest, pulmonary function testing, he is on a PPI, he is on maximal therapy for postnasal drainage, I do not see any other obvious interventions that could help with his cough.  Incidentally, he has also seen a allergist.  At this point, he could see Dr. Mcadams who is an allergist who specializes in chronic cough.  Contact information was " provided.    6. Benign essential hypertension  He has good blood pressure control on his current regimen of medications which he is tolerating well.  - hydrALAZINE (APRESOLINE) 50 MG tablet; Take 1 tablet (50 mg) by mouth 2 times daily  Dispense: 180 tablet; Refill: 3    FUTURE APPOINTMENTS:       - Follow-up visit in 1 year or sooner pending lab results or symptoms    Garrett Sotelo MD  [facility]

## 2018-12-31 NOTE — PATIENT INSTRUCTIONS
"You should get the new shingles vaccine series \"SHINGRIX\" (not Zostavax) at a pharmacy.        You could try over the counter Coricidin HBP as directed for your nasal symptoms.      You could see:  Dr. Kevan Park MD     He is a chronic cough specialist    Address: [address] [place] [address]   Phone: [phone]    "

## 2019-01-01 ASSESSMENT — ASTHMA QUESTIONNAIRES: ACT_TOTALSCORE: 21

## 2019-01-01 NOTE — RESULT ENCOUNTER NOTE
"The following letter pertains to your most recent diagnostic tests:    -Liver and gallbladder tests are normal for you. (ALT,AST, Alk phos, bilirubin), kidney function is normal for you (Creatinine, GFR), Sodium is normal, Potassium is normal for you, Calcium is normal for you, Glucose (blood sugar) is normal for you.        -Your total cholesterol is 206 which is just above your goal of total cholesterol less than 200.  This is primarily because you have very high levels of HDL \"good\" cholesterol.       -Your triglycerides are 90 which are at your goal of triglycerides less than 150.    -Your HDL or \"good cholesterol\" is 74 which is at your goal of HDL cholesterol greater than 40.    -Your LDL cholesterol or \"bad cholesterol\" is 114 which is at your goal of LDL cholesterol less than <130.  Your LDL goal is based on your risk factors for artery disease.     -Your complete blood counts including your hemoglobin returned normal for you.       Bottom line:  Your lab result look stable.      Follow up:  Schedule an appointment for a physical examination with fasting blood tests in one year's time, or return sooner if new questions, symptoms or problems arise.       Sincerely,    Dr. Sotelo  "

## 2019-01-08 ENCOUNTER — TRANSFERRED RECORDS (OUTPATIENT)
Dept: HEALTH INFORMATION MANAGEMENT | Facility: CLINIC | Age: 69
End: 2019-01-08

## 2019-01-16 ENCOUNTER — TELEPHONE (OUTPATIENT)
Dept: FAMILY MEDICINE | Facility: CLINIC | Age: 69
End: 2019-01-16

## 2019-01-16 NOTE — TELEPHONE ENCOUNTER
Reason for Call:  Other     Detailed comments: Pt would like someone to call him regarding the referral to the allergist.     Phone Number Patient can be reached at: Cell number on file:    Telephone Information:   Mobile [phone]       Best Time: any    Can we leave a detailed message on this number? YES    Call taken on 1/16/2019 at 12:44 PM by Layla Crabtree

## 2019-01-17 NOTE — TELEPHONE ENCOUNTER
Spoke with patient and gave info below;    [facility]  Phone: [phone]    Ngoc Rao CMA

## 2019-11-26 DIAGNOSIS — J45.40 MODERATE PERSISTENT ASTHMA WITHOUT COMPLICATION: ICD-10-CM

## 2019-11-26 RX ORDER — ALBUTEROL SULFATE 90 UG/1
1-2 AEROSOL, METERED RESPIRATORY (INHALATION) EVERY 4 HOURS PRN
Qty: 2 INHALER | Refills: 0 | Status: SHIPPED | OUTPATIENT
Start: 2019-11-26 | End: 2021-01-08

## 2019-11-26 NOTE — TELEPHONE ENCOUNTER
"Requested Prescriptions   Pending Prescriptions Disp Refills     albuterol (PROAIR HFA/PROVENTIL HFA/VENTOLIN HFA) 108 (90 Base) MCG/ACT inhaler 3 Inhaler 11     Sig: Inhale 1-2 puffs into the lungs every 4 hours as needed for shortness of breath / dyspnea or wheezing   Last Written Prescription Date:  1/5/2018  Last Fill Quantity: 3,  # refills: 11   Last office visit: 12/31/2018 with prescribing provider:  Deepak   Future Office Visit:   Next 5 appointments (look out 90 days)    Jan 02, 2020  1:00 PM CST  Office Visit with Garrett Sotelo MD  [facility] ([facility] [address] [facility] [id number] [phone]             Asthma Maintenance Inhalers - Anticholinergics Failed - 11/26/2019 10:26 AM        Failed - Asthma control assessment score within normal limits in last 6 months     Please review ACT score.           Failed - Recent (6 mo) or future (30 days) visit within the authorizing provider's specialty     Patient had office visit in the last 6 months or has a visit in the next 30 days with authorizing provider or within the authorizing provider's specialty.  See \"Patient Info\" tab in inbasket, or \"Choose Columns\" in Meds & Orders section of the refill encounter.            Passed - Patient is age 12 years or older        Passed - Medication is active on med list        A 30 day supply is given, patient is due for an office visit.  Patient has appointment scheduled for 1/2/19.  VELIA More, RN  [employer] [facility] [facility]    "

## 2019-12-19 DIAGNOSIS — I10 BENIGN ESSENTIAL HYPERTENSION: ICD-10-CM

## 2019-12-19 NOTE — TELEPHONE ENCOUNTER
atenolol (TENORMIN) 100 MG tablet 90 tablet 3 12/31/2018       Last Written Prescription Date:  12/31/18  Last Fill Quantity: 90,  # refills: 3   Last office visit: 12/31/2018 with prescribing provider:  Deepak   Future Office Visit:   Next 5 appointments (look out 90 days)    Jan 02, 2020  1:00 PM CST  [facility] Visit with [facility] MD  [facility] [address] [facility] [address]  [phone]            felodipine ER (PLENDIL) 5 MG 24 hr tablet 90 tablet 3 12/31/2018       Last Written Prescription Date:  12318  Last Fill Quantity: 90,  # refills: 3   Last office visit: 12/31/2018 with prescribing provider:  Deepak   Future Office Visit:   Next 5 appointments (look out 90 days)    Jan 02, 2020  1:00 PM CST  Office Visit with Garrett Sotelo MD  [facility] [address] [facility] [address] [address]  [phone]           hydrALAZINE (APRESOLINE) 50 MG tablet 180 tablet 3 12/31/2018   Last Written Prescription Date:  12/31/18  Last Fill Quantity: 180,  # refills: 3   Last office visit: 12/31/2018 with prescribing provider:  Deepak   Future Office Visit:   Next 5 appointments (look out 90 days)    Jan 02, 2020  1:00 PM CST  Office Visit with Garrett Sotelo MD  [facility] [address] [facility] [address]  [phone]         Requested Prescriptions   Pending Prescriptions Disp Refills     hydrALAZINE (APRESOLINE) 50 MG tablet [Pharmacy Med Name: HYDRALAZINE  TAB 50MG] 180 tablet 3     Sig: TAKE 1 TABLET TWICE A DAY       Vasodilators Passed - 12/19/2019 10:03 AM        Passed - Most recent BP less than 140/90 on record     BP Readings from Last 3 Encounters:   12/31/18 135/84   08/14/17 139/88   04/14/17 138/88                 Passed - Most recent encounter is not a hospital encounter. Patient has recent (12 mos) or future (1 mos) visit with authorizing provider's specialty      "Patient's most recent encounter is NOT a hospital encounter and has had an office visit in the last 12 months or has a visit in the next 30 days with authorizing provider or within the authorizing provider's specialty.      See \"Patient Info\" tab in inbasket, or \"Choose Columns\" in Meds & Orders section of the refill encounter.      If most recent encounter is a hospital encounter AND the patient does NOT have an appointment scheduled with the authorizing provider or authorizing provider's specialty within the next 30 days, forward refill to authorizing provider for medication review.          Passed - Medication is active on med list        Passed - Patient is of age 18 years or older        felodipine ER (PLENDIL) 5 MG 24 hr tablet [Pharmacy Med Name: FELODIPINE TAB 5MG ER] 90 tablet 3     Sig: TAKE 1 TABLET DAILY       Calcium Channel Blockers Protocol  Passed - 12/19/2019 10:03 AM        Passed - Blood pressure under 140/90 in past 12 months     BP Readings from Last 3 Encounters:   12/31/18 135/84   08/14/17 139/88   04/14/17 138/88                 Passed - Recent (12 mo) or future (30 days) visit within the authorizing provider's specialty     Patient has had an office visit with the authorizing provider or a provider within the authorizing providers department within the previous 12 mos or has a future within next 30 days. See \"Patient Info\" tab in inbasket, or \"Choose Columns\" in Meds & Orders section of the refill encounter.              Passed - Medication is active on med list        Passed - Patient is age 18 or older        Passed - Normal serum creatinine on file in past 12 months     Recent Labs   Lab Test 12/31/18  1357   CR 0.76             atenolol (TENORMIN) 100 MG tablet [Pharmacy Med Name: ATENOLOL TAB 100MG] 90 tablet 3     Sig: TAKE 1 TABLET ONCE DAILY       Beta-Blockers Protocol Passed - 12/19/2019 10:03 AM        Passed - Blood pressure under 140/90 in past 12 months     BP Readings from " "Last 3 Encounters:   12/31/18 135/84   08/14/17 139/88   04/14/17 138/88                 Passed - Patient is age 6 or older        Passed - Recent (12 mo) or future (30 days) visit within the authorizing provider's specialty     Patient has had an office visit with the authorizing provider or a provider within the authorizing providers department within the previous 12 mos or has a future within next 30 days. See \"Patient Info\" tab in inbasket, or \"Choose Columns\" in Meds & Orders section of the refill encounter.              Passed - Medication is active on med list        No flowsheet data found.        "

## 2019-12-20 RX ORDER — HYDRALAZINE HYDROCHLORIDE 50 MG/1
TABLET, FILM COATED ORAL
Qty: 180 TABLET | Refills: 0 | Status: SHIPPED | OUTPATIENT
Start: 2019-12-20 | End: 2020-01-02

## 2019-12-20 RX ORDER — ATENOLOL 100 MG/1
TABLET ORAL
Qty: 90 TABLET | Refills: 0 | Status: SHIPPED | OUTPATIENT
Start: 2019-12-20 | End: 2020-01-02

## 2019-12-20 RX ORDER — FELODIPINE 5 MG/1
TABLET, EXTENDED RELEASE ORAL
Qty: 90 TABLET | Refills: 0 | Status: SHIPPED | OUTPATIENT
Start: 2019-12-20 | End: 2020-01-02

## 2019-12-20 NOTE — TELEPHONE ENCOUNTER
A 390 day supply is given (patient has mail order), patient is due for an office visit. Patient has appointment scheduled for 1/2/2020 with provider  VELIA More, RN  [facility] [facility]

## 2020-01-02 ENCOUNTER — OFFICE VISIT (OUTPATIENT)
Dept: FAMILY MEDICINE | Facility: CLINIC | Age: 70
End: 2020-01-02
Payer: MEDICARE

## 2020-01-02 VITALS
WEIGHT: 216 LBS | HEIGHT: 77 IN | TEMPERATURE: 98 F | BODY MASS INDEX: 25.5 KG/M2 | OXYGEN SATURATION: 94 % | DIASTOLIC BLOOD PRESSURE: 78 MMHG | SYSTOLIC BLOOD PRESSURE: 138 MMHG | HEART RATE: 78 BPM

## 2020-01-02 DIAGNOSIS — K74.69 OTHER CIRRHOSIS OF LIVER (H): ICD-10-CM

## 2020-01-02 DIAGNOSIS — J45.40 MODERATE PERSISTENT ASTHMA WITHOUT COMPLICATION: ICD-10-CM

## 2020-01-02 DIAGNOSIS — Z13.220 LIPID SCREENING: ICD-10-CM

## 2020-01-02 DIAGNOSIS — J32.0 CHRONIC MAXILLARY SINUSITIS: ICD-10-CM

## 2020-01-02 DIAGNOSIS — I10 BENIGN ESSENTIAL HYPERTENSION: Primary | ICD-10-CM

## 2020-01-02 LAB
ERYTHROCYTE [DISTWIDTH] IN BLOOD BY AUTOMATED COUNT: 13.4 % (ref 10–15)
HCT VFR BLD AUTO: 46.8 % (ref 40–53)
HGB BLD-MCNC: 15.8 G/DL (ref 13.3–17.7)
MCH RBC QN AUTO: 30.8 PG (ref 26.5–33)
MCHC RBC AUTO-ENTMCNC: 33.8 G/DL (ref 31.5–36.5)
MCV RBC AUTO: 91 FL (ref 78–100)
PLATELET # BLD AUTO: 164 10E9/L (ref 150–450)
RBC # BLD AUTO: 5.13 10E12/L (ref 4.4–5.9)
WBC # BLD AUTO: 5 10E9/L (ref 4–11)

## 2020-01-02 PROCEDURE — 80053 COMPREHEN METABOLIC PANEL: CPT | Performed by: INTERNAL MEDICINE

## 2020-01-02 PROCEDURE — 36415 COLL VENOUS BLD VENIPUNCTURE: CPT | Performed by: INTERNAL MEDICINE

## 2020-01-02 PROCEDURE — 99214 OFFICE O/P EST MOD 30 MIN: CPT | Performed by: INTERNAL MEDICINE

## 2020-01-02 PROCEDURE — 85027 COMPLETE CBC AUTOMATED: CPT | Performed by: INTERNAL MEDICINE

## 2020-01-02 PROCEDURE — 80061 LIPID PANEL: CPT | Performed by: INTERNAL MEDICINE

## 2020-01-02 RX ORDER — AZELASTINE 1 MG/ML
SPRAY, METERED NASAL
COMMUNITY
Start: 2019-12-09 | End: 2020-01-02

## 2020-01-02 RX ORDER — PSEUDOEPHEDRINE HCL 30 MG
TABLET ORAL EVERY 4 HOURS PRN
Status: ON HOLD | COMMUNITY
End: 2022-04-24

## 2020-01-02 RX ORDER — IPRATROPIUM BROMIDE 42 UG/1
SPRAY, METERED NASAL
Status: ON HOLD | COMMUNITY
Start: 2019-04-17 | End: 2022-04-24

## 2020-01-02 RX ORDER — FELODIPINE 10 MG/1
10 TABLET, EXTENDED RELEASE ORAL DAILY
Qty: 90 TABLET | Refills: 3 | Status: SHIPPED | OUTPATIENT
Start: 2020-01-02 | End: 2021-01-08

## 2020-01-02 RX ORDER — AZELASTINE 1 MG/ML
2 SPRAY, METERED NASAL 2 TIMES DAILY
Qty: 1 BOTTLE | Refills: 11 | Status: SHIPPED | OUTPATIENT
Start: 2020-01-02 | End: 2021-01-08

## 2020-01-02 RX ORDER — FELODIPINE 5 MG/1
5 TABLET, EXTENDED RELEASE ORAL DAILY
Qty: 90 TABLET | Refills: 3 | Status: CANCELLED | OUTPATIENT
Start: 2020-01-02

## 2020-01-02 RX ORDER — MULTIVITAMIN WITH IRON
1 TABLET ORAL DAILY
COMMUNITY
End: 2023-07-13

## 2020-01-02 RX ORDER — ATENOLOL 100 MG/1
TABLET ORAL
Qty: 90 TABLET | Refills: 3 | Status: SHIPPED | OUTPATIENT
Start: 2020-01-02 | End: 2021-01-08

## 2020-01-02 RX ORDER — HYDRALAZINE HYDROCHLORIDE 50 MG/1
50 TABLET, FILM COATED ORAL 2 TIMES DAILY
Qty: 180 TABLET | Refills: 3 | Status: SHIPPED | OUTPATIENT
Start: 2020-01-02 | End: 2021-01-08

## 2020-01-02 ASSESSMENT — MIFFLIN-ST. JEOR: SCORE: 1867.15

## 2020-01-02 NOTE — LETTER
"[facility]  [address] [facility] [address]  [address]  [place]  [address]  Tel: [phone]    January 6, 2020    Alexandro Lee  [id number] [id number]        Dear Mr. Lee,    The results of your recent- total cholesterol is 215 which is above your goal of total cholesterol less than 200.  This is primarily because you have very high levels of HDL \"good\" cholesterol.       -Your triglycerides are 60 which are at your goal of triglycerides less than 150.    -Your HDL or \"good cholesterol\" is 81 which is at your goal of HDL cholesterol greater than 40.    -Your LDL cholesterol or \"bad cholesterol\" is 122 which is at your goal of LDL cholesterol less than <130.  Your LDL goal is based on your risk factors for artery disease.     -Liver and gallbladder tests are normal for you. (ALT,AST, Alk phos, bilirubin), kidney function is normal for you (Creatinine, GFR), Sodium is normal, Potassium is normal for you, Calcium is normal for you, Glucose (blood sugar) is normal for you.      -Your complete blood counts including your hemoglobin returned normal for you.         Bottom line:  Lab results look essentially stable.         Follow up:  Schedule an appointment for a physical examination and blood pressure recheck in one month's time      Sincerely,    Dr. Sotelo/[facility]          Enclosure: Lab Results  Results for orders placed or performed in visit on 01/02/20   CBC with platelets     Status: None   Result Value Ref Range    WBC 5.0 4.0 - 11.0 10e9/L    RBC Count 5.13 4.4 - 5.9 10e12/L    Hemoglobin 15.8 13.3 - 17.7 g/dL    Hematocrit 46.8 40.0 - 53.0 %    MCV 91 78 - 100 fl    MCH 30.8 26.5 - 33.0 pg    MCHC 33.8 31.5 - 36.5 g/dL    RDW 13.4 10.0 - 15.0 %    Platelet Count 164 150 - 450 10e9/L   Lipid panel reflex to direct LDL Fasting     Status: Abnormal   Result Value Ref Range    Cholesterol 215 (H) <200 mg/dL    Triglycerides 60 <150 mg/dL    HDL Cholesterol 81 >39 mg/dL    LDL Cholesterol " Calculated 122 (H) <100 mg/dL    Non HDL Cholesterol 134 (H) <130 mg/dL   Comprehensive metabolic panel     Status: Abnormal   Result Value Ref Range    Sodium 138 133 - 144 mmol/L    Potassium 3.7 3.4 - 5.3 mmol/L    Chloride 104 94 - 109 mmol/L    Carbon Dioxide 26 20 - 32 mmol/L    Anion Gap 8 3 - 14 mmol/L    Glucose 101 (H) 70 - 99 mg/dL    Urea Nitrogen 13 7 - 30 mg/dL    Creatinine 0.63 (L) 0.66 - 1.25 mg/dL    GFR Estimate >90 >60 mL/min/[1.73_m2]    GFR Estimate If Black >90 >60 mL/min/[1.73_m2]    Calcium 8.9 8.5 - 10.1 mg/dL    Bilirubin Total 0.8 0.2 - 1.3 mg/dL    Albumin 4.2 3.4 - 5.0 g/dL    Protein Total 8.1 6.8 - 8.8 g/dL    Alkaline Phosphatase 66 40 - 150 U/L    ALT 52 0 - 70 U/L    AST 42 0 - 45 U/L

## 2020-01-02 NOTE — PROGRESS NOTES
Subjective     Alexandro Lee is a 69 year old male who presents to clinic today for the following health issues:    HPI     Here for refill of medications    Has not followed up with [facility] for liver disease    Medication Followup of Atenolol, Felodipine, Hydralazine    Taking Medication as prescribed: yes    Side Effects:  None    Medication Helping Symptoms:  yes     Asthma Follow-Up    Was ACT completed today?    Yes    ACT Total Scores 1/2/2020   ACT TOTAL SCORE (Goal Greater than or Equal to 20) 20   In the past 12 months, how many times did you visit the emergency room for your asthma without being admitted to the hospital? 0   In the past 12 months, how many times were you hospitalized overnight because of your asthma? 0       How many days per week do you miss taking your asthma controller medication?  0    Please describe any recent triggers for your asthma: change in wheather    Have you had any Emergency Room Visits, Urgent Care Visits, or Hospital Admissions since your last office visit?  No      Hypertension Follow-up      Do you check your blood pressure regularly outside of the clinic? Yes     Are you following a low salt diet? No    Are your blood pressures ever more than 140 on the top number (systolic) OR more   than 90 on the bottom number (diastolic), for example 140/90? No      How many servings of fruits and vegetables do you eat daily?  2-3    On average, how many sweetened beverages do you drink each day (Examples: soda, juice, sweet tea, etc.  Do NOT count diet or artificially sweetened beverages)?   0    How many days per week do you miss taking your medication? 0     Home readings typically 110's to 130's over 70's    Still troubled by severe runny nose and coughing  Sees pulmonary and allergy     Patient Active Problem List   Diagnosis     Essential hypertension     CARDIOVASCULAR SCREENING; LDL GOAL LESS THAN 160     Nonspecific abnormal results of liver function study     Advanced  directives, counseling/discussion     Left hip pain     Moderate persistent asthma     Hyperlipidemia LDL goal <100     Other cirrhosis of liver (H)     Chronic cough     Past Surgical History:   Procedure Laterality Date     ARTHROPLASTY HIP ANTERIOR Left 3/17/2016    Procedure: ARTHROPLASTY HIP ANTERIOR;  Surgeon: Hilario Hicks MD;  Location: [facility]     BIOPSY      prostate     C NONSPECIFIC PROCEDURE      Left ACL and PCL repair     ORTHOPEDIC SURGERY  1980's    ACL and PCL repairs left knee       Social History     Tobacco Use     Smoking status: Never Smoker     Smokeless tobacco: Never Used   Substance Use Topics     Alcohol use: Yes     Alcohol/week: 0.0 standard drinks     Comment: 2 glasses wine day     Family History   Problem Relation Age of Onset     Diabetes Father      Heart Disease Father          Current Outpatient Medications   Medication Sig Dispense Refill     albuterol (PROAIR HFA/PROVENTIL HFA/VENTOLIN HFA) 108 (90 Base) MCG/ACT inhaler Inhale 1-2 puffs into the lungs every 4 hours as needed for shortness of breath / dyspnea or wheezing 2 Inhaler 0     aspirin 81 MG tablet Take 81 mg by mouth daily       atenolol (TENORMIN) 100 MG tablet TAKE 1 TABLET ONCE DAILY 90 tablet 3     azelastine (ASTELIN) 0.1 % nasal spray Spray 2 sprays into both nostrils 2 times daily 1 Bottle 11     cetirizine (ZYRTEC) 10 MG tablet Take 1 tablet (10 mg) by mouth every evening 90 tablet 11     cholecalciferol (VITAMIN  -D) 1000 UNITS capsule Take 1 capsule by mouth daily       DM-APAP-CPM (CORICIDIN HBP FLU PO)        felodipine ER (PLENDIL) 10 MG 24 hr tablet Take 1 tablet (10 mg) by mouth daily 90 tablet 3     fluticasone (FLONASE) 50 MCG/ACT spray Spray 1-2 sprays into both nostrils daily 1 Bottle 11     fluticasone-salmeterol (ADVAIR DISKUS) 250-50 MCG/DOSE inhaler Inhale 1 puff into the lungs 2 times daily - Additional refills after future office visit 3 Inhaler 11     hydrALAZINE (APRESOLINE) 50 MG tablet  "Take 1 tablet (50 mg) by mouth 2 times daily 180 tablet 3     ibuprofen (ADVIL/MOTRIN) 100 MG tablet Take 100 mg by mouth every 4 hours as needed       INCRUSE ELLIPTA 62.5 MCG/INH Inhaler INL 1 PUFF PO QD       ipratropium (ATROVENT) 0.06 % nasal spray        magnesium 250 MG tablet Take 1 tablet by mouth daily       montelukast (SINGULAIR) 10 MG tablet TAKE 1 TABLET (10 MG) BY MOUTH AT BEDTIME 90 tablet 11     Multiple Vitamins-Minerals (CENTRUM SILVER) per tablet Take 1 tablet by mouth daily       OMEPRAZOLE PO Take 20 mg by mouth as needed        pseudoePHEDrine (SUDAFED) 30 MG tablet Take by mouth every 4 hours as needed for congestion       Allergies   Allergen Reactions     Amoxicillin Hives     hives     Lisinopril      Cough       Tetanus Toxoids      DPT as a child caused severe swelling of arm       Reviewed and updated as needed this visit by Provider         Review of Systems   ROS COMP: Constitutional, HEENT, cardiovascular, pulmonary, gi and gu systems are negative, except as otherwise noted.      Objective    /78 (BP Location: Right arm, Patient Position: Sitting, Cuff Size: Adult Large)   Pulse 78   Temp 98  F (36.7  C) (Oral)   Ht 1.956 m (6' 5\")   Wt 98 kg (216 lb)   SpO2 94%   BMI 25.61 kg/m    Body mass index is 25.61 kg/m .  Physical Exam   Well appearing     Labs pending         Assessment & Plan     1. Benign essential hypertension  Not well controlled  Increase plendil to 10mg daily ; potential side effects and risks discussed  Goal < 120/70  Recheck one month preventive xam   - atenolol (TENORMIN) 100 MG tablet; TAKE 1 TABLET ONCE DAILY  Dispense: 90 tablet; Refill: 3  - hydrALAZINE (APRESOLINE) 50 MG tablet; Take 1 tablet (50 mg) by mouth 2 times daily  Dispense: 180 tablet; Refill: 3  - felodipine ER (PLENDIL) 10 MG 24 hr tablet; Take 1 tablet (10 mg) by mouth daily  Dispense: 90 tablet; Refill: 3    2. Other cirrhosis of liver (H)  Reminded to schedule HCC screen ultrasound " "he is overdue; he does not want me to order; he would prefer to have it done in [place]   - CBC with platelets  - Comprehensive metabolic panel    3. Moderate persistent asthma without complication  Stable; continue follow up with allergy and pulmonary   - fluticasone-salmeterol (ADVAIR DISKUS) 250-50 MCG/DOSE inhaler; Inhale 1 puff into the lungs 2 times daily - Additional refills after future office visit  Dispense: 3 Inhaler; Refill: 11    4. Lipid screening    - Lipid panel reflex to direct LDL Fasting    5. Chronic maxillary sinusitis    - azelastine (ASTELIN) 0.1 % nasal spray; Spray 2 sprays into both nostrils 2 times daily  Dispense: 1 Bottle; Refill: 11     BMI:   Estimated body mass index is 25.61 kg/m  as calculated from the following:    Height as of this encounter: 1.956 m (6' 5\").    Weight as of this encounter: 98 kg (216 lb).   Weight management plan: Discussed healthy diet and exercise guidelines      He declines screening PSA today     Return in about 1 month (around 2/2/2020) for Preventive Visit.    Garrett Sotelo MD  [facility]      Total face to face contact time was greater than 26 minutes of which more than 50% of this time was spent counseling and coordinating care regarding the above topics.    "

## 2020-01-03 LAB
ALBUMIN SERPL-MCNC: 4.2 G/DL (ref 3.4–5)
ALP SERPL-CCNC: 66 U/L (ref 40–150)
ALT SERPL W P-5'-P-CCNC: 52 U/L (ref 0–70)
ANION GAP SERPL CALCULATED.3IONS-SCNC: 8 MMOL/L (ref 3–14)
AST SERPL W P-5'-P-CCNC: 42 U/L (ref 0–45)
BILIRUB SERPL-MCNC: 0.8 MG/DL (ref 0.2–1.3)
BUN SERPL-MCNC: 13 MG/DL (ref 7–30)
CALCIUM SERPL-MCNC: 8.9 MG/DL (ref 8.5–10.1)
CHLORIDE SERPL-SCNC: 104 MMOL/L (ref 94–109)
CHOLEST SERPL-MCNC: 215 MG/DL
CO2 SERPL-SCNC: 26 MMOL/L (ref 20–32)
CREAT SERPL-MCNC: 0.63 MG/DL (ref 0.66–1.25)
GFR SERPL CREATININE-BSD FRML MDRD: >90 ML/MIN/{1.73_M2}
GLUCOSE SERPL-MCNC: 101 MG/DL (ref 70–99)
HDLC SERPL-MCNC: 81 MG/DL
LDLC SERPL CALC-MCNC: 122 MG/DL
NONHDLC SERPL-MCNC: 134 MG/DL
POTASSIUM SERPL-SCNC: 3.7 MMOL/L (ref 3.4–5.3)
PROT SERPL-MCNC: 8.1 G/DL (ref 6.8–8.8)
SODIUM SERPL-SCNC: 138 MMOL/L (ref 133–144)
TRIGL SERPL-MCNC: 60 MG/DL

## 2020-01-03 ASSESSMENT — ASTHMA QUESTIONNAIRES: ACT_TOTALSCORE: 17

## 2020-01-06 NOTE — RESULT ENCOUNTER NOTE
"The following letter pertains to your most recent diagnostic tests:    -Your total cholesterol is 215 which is above your goal of total cholesterol less than 200.  This is primarily because you have very high levels of HDL \"good\" cholesterol.       -Your triglycerides are 60 which are at your goal of triglycerides less than 150.    -Your HDL or \"good cholesterol\" is 81 which is at your goal of HDL cholesterol greater than 40.    -Your LDL cholesterol or \"bad cholesterol\" is 122 which is at your goal of LDL cholesterol less than <130.  Your LDL goal is based on your risk factors for artery disease.     -Liver and gallbladder tests are normal for you. (ALT,AST, Alk phos, bilirubin), kidney function is normal for you (Creatinine, GFR), Sodium is normal, Potassium is normal for you, Calcium is normal for you, Glucose (blood sugar) is normal for you.      -Your complete blood counts including your hemoglobin returned normal for you.         Bottom line:  Lab results look essentially stable.         Follow up:  Schedule an appointment for a physical examination and blood pressure recheck in one month's time      Sincerely,    Dr. Sotelo"

## 2020-02-11 DIAGNOSIS — J45.40 MODERATE PERSISTENT ASTHMA WITHOUT COMPLICATION: ICD-10-CM

## 2020-02-11 NOTE — TELEPHONE ENCOUNTER
Reason for Call:  Medication or medication refill:    Do you use a [facility]?  Name of the pharmacy and phone number for the current request:      [facility] [place] [place] - [address] AT PORTAL TO REGISTERED [facility] SITES    Name of the medication requested:   fluticasone-salmeterol (ADVAIR DISKUS) 250-50 MCG/DOSE inhaler 1 puff, 2 TIMES DAILY         Other request: Advair is covered by his new insurance and needs to be sent to [facility] please    Can we leave a detailed message on this number? YES    Phone number patient can be reached at: Cell number on file:    Telephone Information:   Mobile [phone]       Best Time: anytime    Call taken on 2/11/2020 at 9:02 AM by Nakia Lu

## 2020-02-11 NOTE — TELEPHONE ENCOUNTER
"fluticasone-salmeterol (ADVAIR DISKUS) 250-50 MCG/DOSE inhaler    Last Written Prescription Date:  01/02/2020  Last Fill Quantity: 3,  # refills: 11   Last office visit: 1/2/2020 with prescribing provider:  Deepak   Future Office Visit:   Next 5 appointments (look out 90 days)    Mar 16, 2020  1:30 PM CDT  PHYSICAL with Garrett Sotelo MD  [facility] [place]) [address] [facility] [address] [phone]           Requested Prescriptions   Pending Prescriptions Disp Refills     fluticasone-salmeterol (ADVAIR DISKUS) 250-50 MCG/DOSE inhaler 3 Inhaler 11     Sig: Inhale 1 puff into the lungs 2 times daily - Additional refills after future office visit       Inhaled Steroids Protocol Passed - 2/11/2020  9:03 AM        Passed - Patient is age 12 or older        Passed - Asthma control assessment score within normal limits in last 6 months     Please review ACT score.           Passed - Medication is active on med list        Passed - Recent (6 mo) or future (30 days) visit within the authorizing provider's specialty     Patient had office visit in the last 6 months or has a visit in the next 30 days with authorizing provider or within the authorizing provider's specialty.  See \"Patient Info\" tab in inbasket, or \"Choose Columns\" in Meds & Orders section of the refill encounter.              "

## 2020-02-12 NOTE — TELEPHONE ENCOUNTER
Pharmacy correction only.    Prescription approved per [facility] Refill Protocol.  Agata Ramirez RN on 2/12/2020 at 10:21 AM

## 2020-02-24 ENCOUNTER — TRANSFERRED RECORDS (OUTPATIENT)
Dept: HEALTH INFORMATION MANAGEMENT | Facility: CLINIC | Age: 70
End: 2020-02-24

## 2020-02-24 ENCOUNTER — HOSPITAL LABORATORY (OUTPATIENT)
Dept: OTHER | Facility: CLINIC | Age: 70
End: 2020-02-24

## 2020-02-27 LAB
BACTERIA SPEC CULT: ABNORMAL
Lab: ABNORMAL
SPECIMEN SOURCE: ABNORMAL

## 2021-01-07 DIAGNOSIS — I10 BENIGN ESSENTIAL HYPERTENSION: ICD-10-CM

## 2021-01-07 DIAGNOSIS — J45.40 MODERATE PERSISTENT ASTHMA WITHOUT COMPLICATION: ICD-10-CM

## 2021-01-07 DIAGNOSIS — J32.0 CHRONIC MAXILLARY SINUSITIS: ICD-10-CM

## 2021-01-07 NOTE — TELEPHONE ENCOUNTER
Reason for Call:  Other prescription    Detailed comments:  Patient needs refills for a year on this medications and if some one can let him know with the covid vaccine is availabe.     fluticasone-salmeterol (ADVAIR DISKUS) 250-50 MCG/DOSE inhaler    atenolol (TENORMIN) 100 MG tablet    felodipine ER (PLENDIL) 10 MG 24 hr tablet  azelastine (ASTELIN) 0.1 % nasal spray    albuterol (PROAIR HFA/PROVENTIL   hydrALAZINE (APRESOLINE) 50 MG tablet    Phone Number Patient can be reached at: Home number on file [phone] (home)    Best Time:  Any time     Can we leave a detailed message on this number? YES     Call taken on 1/7/2021 at 3:10 PM by Ingrid Carranza

## 2021-01-08 RX ORDER — FELODIPINE 10 MG/1
10 TABLET, EXTENDED RELEASE ORAL DAILY
Qty: 90 TABLET | Refills: 0 | Status: SHIPPED | OUTPATIENT
Start: 2021-01-08 | End: 2021-01-20

## 2021-01-08 RX ORDER — ATENOLOL 100 MG/1
TABLET ORAL
Qty: 90 TABLET | Refills: 0 | Status: SHIPPED | OUTPATIENT
Start: 2021-01-08 | End: 2021-01-20

## 2021-01-08 RX ORDER — HYDRALAZINE HYDROCHLORIDE 50 MG/1
50 TABLET, FILM COATED ORAL 2 TIMES DAILY
Qty: 180 TABLET | Refills: 0 | Status: SHIPPED | OUTPATIENT
Start: 2021-01-08 | End: 2021-01-20

## 2021-01-08 RX ORDER — AZELASTINE 1 MG/ML
2 SPRAY, METERED NASAL 2 TIMES DAILY
Qty: 1 BOTTLE | Refills: 0 | Status: SHIPPED | OUTPATIENT
Start: 2021-01-08 | End: 2021-01-20

## 2021-01-08 RX ORDER — ALBUTEROL SULFATE 90 UG/1
1-2 AEROSOL, METERED RESPIRATORY (INHALATION) EVERY 4 HOURS PRN
Qty: 2 INHALER | Refills: 0 | Status: SHIPPED | OUTPATIENT
Start: 2021-01-08 | End: 2022-05-04

## 2021-01-08 NOTE — TELEPHONE ENCOUNTER
A 90 day supply is given, patient is due for an office visit.  Please call to  assist the patient in scheduling an appointment.  VELIA More, RN  [employer] [facility]

## 2021-01-20 ENCOUNTER — VIRTUAL VISIT (OUTPATIENT)
Dept: FAMILY MEDICINE | Facility: CLINIC | Age: 71
End: 2021-01-20
Payer: MEDICARE

## 2021-01-20 DIAGNOSIS — J45.40 MODERATE PERSISTENT ASTHMA WITHOUT COMPLICATION: ICD-10-CM

## 2021-01-20 DIAGNOSIS — J32.0 CHRONIC MAXILLARY SINUSITIS: ICD-10-CM

## 2021-01-20 DIAGNOSIS — K74.69 OTHER CIRRHOSIS OF LIVER (H): ICD-10-CM

## 2021-01-20 DIAGNOSIS — I10 BENIGN ESSENTIAL HYPERTENSION: ICD-10-CM

## 2021-01-20 PROCEDURE — 99442 PR PHYSICIAN TELEPHONE EVALUATION 11-20 MIN: CPT | Mod: 95 | Performed by: INTERNAL MEDICINE

## 2021-01-20 RX ORDER — ATENOLOL 100 MG/1
TABLET ORAL
Qty: 90 TABLET | Refills: 3 | Status: SHIPPED | OUTPATIENT
Start: 2021-01-20 | End: 2021-06-24

## 2021-01-20 RX ORDER — GUAIFENESIN AND DEXTROMETHORPHAN HYDROBROMIDE 1200; 60 MG/1; MG/1
TABLET, EXTENDED RELEASE ORAL
COMMUNITY
End: 2022-10-26

## 2021-01-20 RX ORDER — MONTELUKAST SODIUM 10 MG/1
TABLET ORAL
Qty: 90 TABLET | Refills: 3 | Status: SHIPPED | OUTPATIENT
Start: 2021-01-20 | End: 2021-12-17

## 2021-01-20 RX ORDER — HYDRALAZINE HYDROCHLORIDE 50 MG/1
50 TABLET, FILM COATED ORAL 2 TIMES DAILY
Qty: 180 TABLET | Refills: 3 | Status: SHIPPED | OUTPATIENT
Start: 2021-01-20 | End: 2022-03-23

## 2021-01-20 RX ORDER — AZELASTINE 1 MG/ML
2 SPRAY, METERED NASAL 2 TIMES DAILY
Qty: 30 ML | Refills: 11 | Status: SHIPPED | OUTPATIENT
Start: 2021-01-20 | End: 2022-05-04

## 2021-01-20 RX ORDER — FELODIPINE 10 MG/1
10 TABLET, EXTENDED RELEASE ORAL DAILY
Qty: 90 TABLET | Refills: 3 | Status: SHIPPED | OUTPATIENT
Start: 2021-01-20 | End: 2022-08-15

## 2021-01-20 NOTE — PROGRESS NOTES
20 minutes spent on the date of the encounter doing chart review, history and exam, documentation and further activities as noted aboveletha is a 70 year old who is being evaluated via a billable telephone visit.      What phone number would you like to be contacted at? [phone]  How would you like to obtain your AVS? Mail a copy  Assessment & Plan     Benign essential hypertension  Well controlled   - atenolol (TENORMIN) 100 MG tablet; TAKE 1 TABLET ONCE DAILY  - felodipine ER (PLENDIL) 10 MG 24 hr tablet; Take 1 tablet (10 mg) by mouth daily  - hydrALAZINE (APRESOLINE) 50 MG tablet; Take 1 tablet (50 mg) by mouth 2 times daily    Chronic maxillary sinusitis  I can't think a much more medical therapy for this problem  He should see ENT after he is vaccinated   - azelastine (ASTELIN) 0.1 % nasal spray; Spray 2 sprays into both nostrils 2 times daily    Moderate persistent asthma without complication  Stable   - fluticasone-salmeterol (ADVAIR DISKUS) 250-50 MCG/DOSE inhaler; Inhale 1 puff into the lungs 2 times daily  - montelukast (SINGULAIR) 10 MG tablet; TAKE 1 TABLET (10 MG) BY MOUTH AT BEDTIME    Other cirrhosis of liver (H)  He is overdue for HCC screening, will schedule after he is vaccinated                 20 minutes spent on the date of the encounter doing chart review, history and exam, documentation and further activities as noted above               Return in about 6 months (around 7/20/2021) for Preventive Visit.  Patient instructed to return to clinic or contact us sooner if symptoms worsen or new symptoms develop.     Garrett Sotelo MD  [facility] BARBARA Mc is a 70 year old who presents to clinic today for the following health issues     HPI     *Patient is requesting a 90 day supply for azelastine (ASTELIN) 0.1 % nasal spray*    Medication Followup of Hydralazine, Advair, Plendil, Astelin and Atenolol    Taking Medication as prescribed: yes    Side Effects:   None    Medication Helping Symptoms:  NO - currently having chronic sinus issues     Still having trouble with sinuses , but he does not want to return to ENT until after he is vaccinated for COVID  His home blood pressure readings are well controlled     Review of Systems         Objective    Vitals - Patient Reported  Systolic (Patient Reported): 124  Diastolic (Patient Reported): 73  Weight (Patient Reported): 93 kg (205 lb)      Vitals:  No vitals were obtained today due to virtual visit.    Physical Exam   healthy, alert and no distress  PSYCH: Alert and oriented times 3; coherent speech, normal   rate and volume, able to articulate logical thoughts, able   to abstract reason, no tangential thoughts, no hallucinations   or delusions  His affect is normal  RESP: No cough, no audible wheezing, able to talk in full sentences  Remainder of exam unable to be completed due to telephone visits                Phone call duration: 13 minutes

## 2021-06-24 ENCOUNTER — MEDICAL CORRESPONDENCE (OUTPATIENT)
Dept: HEALTH INFORMATION MANAGEMENT | Facility: CLINIC | Age: 71
End: 2021-06-24

## 2021-06-24 ENCOUNTER — TELEPHONE (OUTPATIENT)
Dept: FAMILY MEDICINE | Facility: CLINIC | Age: 71
End: 2021-06-24

## 2021-06-24 DIAGNOSIS — I10 BENIGN ESSENTIAL HYPERTENSION: ICD-10-CM

## 2021-06-24 RX ORDER — ATENOLOL 100 MG/1
50 TABLET ORAL 2 TIMES DAILY
Qty: 90 TABLET | Refills: 3 | Status: SHIPPED | OUTPATIENT
Start: 2021-06-24 | End: 2022-05-04

## 2021-06-24 NOTE — TELEPHONE ENCOUNTER
That is excellent news!  Splitting the atenolol into 50 mg twice daily dosing schedule will provide better 24-hour blood pressure control as well.  I am all for it!

## 2021-06-24 NOTE — TELEPHONE ENCOUNTER
Dr. Sotelo,     Pt calling with update.   He just recently started taking the atenolol differently. Rather than 100mg once per day, he is cutting pill in half (50mg) and taking BID.   Says that since this change, he is Is not having the coughing episodes and doesn't lose his breath like he has been for the past few years.   Says he will stay have a few, but they are not as bad as they were when he was taking 100mg 1 full tab daily.   Blood pressure is good.   He has appt to see you on 8/17/2021 2:00 PM.  Wondering if you have any rec's or adjustments prior to appt?

## 2021-06-24 NOTE — TELEPHONE ENCOUNTER
Called patient with information from Dr Sotelo.   Updated sig on med list.     Dina GUNN, RN      June 24, 2021  4:22 PM

## 2021-07-30 DIAGNOSIS — K74.69 OTHER CIRRHOSIS OF LIVER (H): Primary | ICD-10-CM

## 2021-08-17 ENCOUNTER — OFFICE VISIT (OUTPATIENT)
Dept: FAMILY MEDICINE | Facility: CLINIC | Age: 71
End: 2021-08-17
Payer: MEDICARE

## 2021-08-17 VITALS
WEIGHT: 208.4 LBS | DIASTOLIC BLOOD PRESSURE: 73 MMHG | TEMPERATURE: 96.9 F | RESPIRATION RATE: 16 BRPM | HEIGHT: 77 IN | HEART RATE: 82 BPM | SYSTOLIC BLOOD PRESSURE: 113 MMHG | OXYGEN SATURATION: 96 % | BODY MASS INDEX: 24.61 KG/M2

## 2021-08-17 DIAGNOSIS — E78.5 HYPERLIPIDEMIA LDL GOAL <100: ICD-10-CM

## 2021-08-17 DIAGNOSIS — J45.40 MODERATE PERSISTENT ASTHMA WITHOUT COMPLICATION: ICD-10-CM

## 2021-08-17 DIAGNOSIS — I10 ESSENTIAL HYPERTENSION: ICD-10-CM

## 2021-08-17 DIAGNOSIS — Z00.00 ROUTINE GENERAL MEDICAL EXAMINATION AT A HEALTH CARE FACILITY: Primary | ICD-10-CM

## 2021-08-17 DIAGNOSIS — K74.69 OTHER CIRRHOSIS OF LIVER (H): ICD-10-CM

## 2021-08-17 LAB
AFP SERPL-MCNC: 4.6 UG/L (ref 0–8)
ERYTHROCYTE [DISTWIDTH] IN BLOOD BY AUTOMATED COUNT: 14 % (ref 10–15)
HCT VFR BLD AUTO: 48.3 % (ref 40–53)
HGB BLD-MCNC: 16.9 G/DL (ref 13.3–17.7)
INR PPP: 1.09 (ref 0.85–1.15)
MCH RBC QN AUTO: 30.8 PG (ref 26.5–33)
MCHC RBC AUTO-ENTMCNC: 35 G/DL (ref 31.5–36.5)
MCV RBC AUTO: 88 FL (ref 78–100)
PLATELET # BLD AUTO: 223 10E3/UL (ref 150–450)
RBC # BLD AUTO: 5.48 10E6/UL (ref 4.4–5.9)
WBC # BLD AUTO: 6.8 10E3/UL (ref 4–11)

## 2021-08-17 PROCEDURE — G0438 PPPS, INITIAL VISIT: HCPCS | Performed by: INTERNAL MEDICINE

## 2021-08-17 PROCEDURE — 85027 COMPLETE CBC AUTOMATED: CPT | Performed by: INTERNAL MEDICINE

## 2021-08-17 PROCEDURE — 80053 COMPREHEN METABOLIC PANEL: CPT | Performed by: INTERNAL MEDICINE

## 2021-08-17 PROCEDURE — 99214 OFFICE O/P EST MOD 30 MIN: CPT | Mod: 25 | Performed by: INTERNAL MEDICINE

## 2021-08-17 PROCEDURE — 82105 ALPHA-FETOPROTEIN SERUM: CPT | Performed by: INTERNAL MEDICINE

## 2021-08-17 PROCEDURE — 80061 LIPID PANEL: CPT | Performed by: INTERNAL MEDICINE

## 2021-08-17 PROCEDURE — 36415 COLL VENOUS BLD VENIPUNCTURE: CPT | Performed by: INTERNAL MEDICINE

## 2021-08-17 PROCEDURE — 85610 PROTHROMBIN TIME: CPT | Performed by: INTERNAL MEDICINE

## 2021-08-17 ASSESSMENT — ENCOUNTER SYMPTOMS
NERVOUS/ANXIOUS: 0
SORE THROAT: 0
DIARRHEA: 0
CONSTIPATION: 0
CHILLS: 0
HEMATURIA: 0
HEADACHES: 0
DYSURIA: 0
FEVER: 0
FREQUENCY: 0
DIZZINESS: 0
MYALGIAS: 0
NAUSEA: 0
EYE PAIN: 0
COUGH: 1
HEMATOCHEZIA: 0
ARTHRALGIAS: 0
HEARTBURN: 0
SHORTNESS OF BREATH: 1
WEAKNESS: 0
PARESTHESIAS: 0
ABDOMINAL PAIN: 0
PALPITATIONS: 0
JOINT SWELLING: 0

## 2021-08-17 ASSESSMENT — MIFFLIN-ST. JEOR: SCORE: 1822.68

## 2021-08-17 ASSESSMENT — ACTIVITIES OF DAILY LIVING (ADL): CURRENT_FUNCTION: NO ASSISTANCE NEEDED

## 2021-08-17 NOTE — PROGRESS NOTES
"SUBJECTIVE:   Alexandro Lee is a 71 year old male who presents for Preventive Visit.      Patient has been advised of split billing requirements and indicates understanding: Yes   Are you in the first 12 months of your Medicare coverage?  No    Healthy Habits:     In general, how would you rate your overall health?  Fair    Frequency of exercise:  None    Do you usually eat at least 4 servings of fruit and vegetables a day, include whole grains    & fiber and avoid regularly eating high fat or \"junk\" foods?  No    Taking medications regularly:  Yes    Medication side effects:  Not applicable    Ability to successfully perform activities of daily living:  No assistance needed    Home Safety:  No safety concerns identified    Hearing Impairment:  Difficulty understanding soft or whispered speech    In the past 6 months, have you been bothered by leaking of urine?  No    In general, how would you rate your overall mental or emotional health?  Good      PHQ-2 Total Score: 0    Additional concerns today:  No    Do you feel safe in your environment? Yes    Have you ever done Advance Care Planning? (For example, a Health Directive, POLST, or a discussion with a medical provider or your loved ones about your wishes): Yes, patient states has an Advance Care Planning document and will bring a copy to the clinic.       Fall risk  Fallen 2 or more times in the past year?: No  Any fall with injury in the past year?: No    Cognitive Screening   1) Repeat 3 items (Leader, Season, Table)    2) Clock draw: NORMAL  3) 3 item recall: Recalls 3 objects  Results: 3 items recalled: COGNITIVE IMPAIRMENT LESS LIKELY    Mini-CogTM Copyright KRISTIE Pimentel. Licensed by the author for use in [facility]; reprinted with permission (ramona@.[email or web address]). All rights reserved.      Do you have sleep apnea, excessive snoring or daytime drowsiness?: no    Reviewed and updated as needed this visit by clinical staff  Tobacco  Allergies  Meds       "        Reviewed and updated as needed this visit by Provider                Social History     Tobacco Use     Smoking status: Never Smoker     Smokeless tobacco: Never Used   Substance Use Topics     Alcohol use: Yes     Alcohol/week: 0.0 standard drinks     Comment: 2 glasses wine day     If you drink alcohol do you typically have >3 drinks per day or >7 drinks per week? No    Alcohol Use 8/17/2021   Prescreen: >3 drinks/day or >7 drinks/week? Yes   Prescreen: >3 drinks/day or >7 drinks/week? -   AUDIT SCORE  5           Current providers sharing in care for this patient include:   Patient Care Team:  Garrett Sotelo MD as PCP - General (Internal Medicine)  Garrett Sotelo MD as Assigned PCP    The following health maintenance items are reviewed in Epic and correct as of today:  Health Maintenance Due   Topic Date Due     ASTHMA ACTION PLAN  Never done     HEPATITIS B IMMUNIZATION (1 of 3 - Risk 3-dose series) 02/25/1970     ZOSTER IMMUNIZATION (2 of 3) 01/29/2016     ASTHMA CONTROL TEST  07/02/2020     Patient Active Problem List   Diagnosis     Essential hypertension     CARDIOVASCULAR SCREENING; LDL GOAL LESS THAN 160     Nonspecific abnormal results of liver function study     Advanced directives, counseling/discussion     Left hip pain     Moderate persistent asthma     Hyperlipidemia LDL goal <100     Other cirrhosis of liver (H)     Chronic cough     Past Surgical History:   Procedure Laterality Date     ARTHROPLASTY HIP ANTERIOR Left 3/17/2016    Procedure: ARTHROPLASTY HIP ANTERIOR;  Surgeon: Hilario Hicks MD;  Location: [facility] OR     BIOPSY      prostate     ORTHOPEDIC SURGERY  1980's    ACL and PCL repairs left knee     ZZC NONSPECIFIC PROCEDURE      Left ACL and PCL repair       Social History     Tobacco Use     Smoking status: Never Smoker     Smokeless tobacco: Never Used   Substance Use Topics     Alcohol use: Yes     Alcohol/week: 0.0 standard drinks     Comment: 2 glasses wine day     Family  History   Problem Relation Age of Onset     Diabetes Father      Heart Disease Father          Current Outpatient Medications   Medication Sig Dispense Refill     albuterol (PROAIR HFA/PROVENTIL HFA/VENTOLIN HFA) 108 (90 Base) MCG/ACT inhaler Inhale 1-2 puffs into the lungs every 4 hours as needed for shortness of breath / dyspnea or wheezing 2 Inhaler 0     aspirin 81 MG tablet Take 81 mg by mouth daily       atenolol (TENORMIN) 100 MG tablet Take 0.5 tablets (50 mg) by mouth 2 times daily 90 tablet 3     azelastine (ASTELIN) 0.1 % nasal spray Spray 2 sprays into both nostrils 2 times daily 30 mL 11     cetirizine (ZYRTEC) 10 MG tablet Take 1 tablet (10 mg) by mouth every evening 90 tablet 11     cholecalciferol (VITAMIN  -D) 1000 UNITS capsule Take 2 capsules by mouth daily        Dextromethorphan-Guaifenesin  MG TB12        DM-APAP-CPM (CORICIDIN HBP FLU PO)        felodipine ER (PLENDIL) 10 MG 24 hr tablet Take 1 tablet (10 mg) by mouth daily 90 tablet 3     fluticasone (FLONASE) 50 MCG/ACT spray Spray 1-2 sprays into both nostrils daily 1 Bottle 11     fluticasone-salmeterol (ADVAIR DISKUS) 250-50 MCG/DOSE inhaler Inhale 1 puff into the lungs 2 times daily 3 each 11     hydrALAZINE (APRESOLINE) 50 MG tablet Take 1 tablet (50 mg) by mouth 2 times daily 180 tablet 3     ibuprofen (ADVIL/MOTRIN) 100 MG tablet Take 100 mg by mouth every 4 hours as needed       INCRUSE ELLIPTA 62.5 MCG/INH Inhaler INL 1 PUFF PO QD       ipratropium (ATROVENT) 0.06 % nasal spray        magnesium 250 MG tablet Take 1 tablet by mouth daily       montelukast (SINGULAIR) 10 MG tablet TAKE 1 TABLET (10 MG) BY MOUTH AT BEDTIME 90 tablet 3     Multiple Vitamins-Minerals (CENTRUM SILVER) per tablet Take 1 tablet by mouth daily       OMEPRAZOLE PO Take 20 mg by mouth as needed        pseudoePHEDrine (SUDAFED) 30 MG tablet Take by mouth every 4 hours as needed for congestion       Allergies   Allergen Reactions     Amoxicillin Hives      "hives     Lisinopril      Cough       Tetanus Toxoids      DPT as a child caused severe swelling of arm       Review of Systems   Constitutional: Negative for chills and fever.   HENT: Positive for congestion. Negative for ear pain, hearing loss and sore throat.    Eyes: Negative for pain and visual disturbance.   Respiratory: Positive for cough and shortness of breath.    Cardiovascular: Negative for chest pain, palpitations and peripheral edema.   Gastrointestinal: Negative for abdominal pain, constipation, diarrhea, heartburn, hematochezia and nausea.   Genitourinary: Positive for impotence. Negative for discharge, dysuria, frequency, genital sores, hematuria and urgency.   Musculoskeletal: Negative for arthralgias, joint swelling and myalgias.   Skin: Negative for rash.   Neurological: Negative for dizziness, weakness, headaches and paresthesias.   Psychiatric/Behavioral: Negative for mood changes. The patient is not nervous/anxious.      Continues to follow up with ENT and asthma regarding chronic sinus issues asthma/cough  He has tried viagra for his erections     OBJECTIVE:   /73 (BP Location: Left arm, Cuff Size: Adult Large)   Pulse 82   Temp 96.9  F (36.1  C) (Tympanic)   Resp 16   Ht 1.956 m (6' 5\")   Wt 94.5 kg (208 lb 6.4 oz)   SpO2 96%   BMI 24.71 kg/m   Estimated body mass index is 24.71 kg/m  as calculated from the following:    Height as of this encounter: 1.956 m (6' 5\").    Weight as of this encounter: 94.5 kg (208 lb 6.4 oz).  Physical Exam  GENERAL: healthy, alert and no distress  EYES: Eyes grossly normal to inspection, PERRL and conjunctivae and sclerae normal  HENT: ear canals and TM's normal  NECK: no adenopathy, no asymmetry, masses, or scars and thyroid normal to palpation  RESP: lungs clear to auscultation - no rales, rhonchi or wheezes  CV: regular rate and rhythm, normal S1 S2, no S3 or S4, no murmur, click or rub, no peripheral edema and peripheral pulses strong  ABDOMEN: " "soft, nontender, no hepatosplenomegaly, no masses and bowel sounds normal  RECTAL: normal sphincter tone, no rectal masses, prostate normal size, smooth, nontender without nodules or masses  MS: no gross musculoskeletal defects noted, no edema  SKIN: no suspicious lesions or rashes  NEURO: Normal strength and tone, mentation intact and speech normal  PSYCH: mentation appears normal, affect normal/bright    Labs pending     ASSESSMENT / PLAN:   1. Routine general medical examination at a health care facility      2. Other cirrhosis of liver (H)  Continue follow up with [facility] ; they have ordered labs for today     3. Moderate persistent asthma without complication  Symptoms not controlled, but I don't have any new ideas for improving management of this historically tough problem for him, he has an appointment with his asthma specialist scheduled for this fall     4. Essential hypertension   well controlled on current regimen    5. Hyperlipidemia LDL goal <130  Recheck lipids   - Lipid panel reflex to direct LDL Fasting; Future    Patient has been advised of split billing requirements and indicates understanding: Yes  COUNSELING:  Reviewed preventive health counseling, as reflected in patient instructions  Special attention given to:       Consider AAA screening for ages 65-75 and smoking history       Regular exercise       Healthy diet/nutrition       Immunizations    Reminded to get shingrix              Hepatitis C screening       HIV screening for high risk patient       Consider lung cancer screening for ages 55-80 years and 30 pack-year smoking history ; never smoker        Colon cancer screening; repeat 2022       Prostate cancer screening; PSA was declined by patient today after discussion of pros and cons of PSA screening     Estimated body mass index is 24.71 kg/m  as calculated from the following:    Height as of this encounter: 1.956 m (6' 5\").    Weight as of this encounter: 94.5 kg (208 lb 6.4 " oz).        He reports that he has never smoked. He has never used smokeless tobacco.      Appropriate preventive services were discussed with this patient, including applicable screening as appropriate for cardiovascular disease, diabetes, osteopenia/osteoporosis, and glaucoma.  As appropriate for age/gender, discussed screening for colorectal cancer, prostate cancer, breast cancer, and cervical cancer. Checklist reviewing preventive services available has been given to the patient.    Reviewed patients plan of care and provided an AVS. The Basic Care Plan (routine screening as documented in Health Maintenance) for Alexandro meets the Care Plan requirement. This Care Plan has been established and reviewed with the Patient.    Counseling Resources:  ATP IV Guidelines  Pooled Cohorts Equation Calculator  Breast Cancer Risk Calculator  Breast Cancer: Medication to Reduce Risk  FRAX Risk Assessment  ICSI Preventive Guidelines  Dietary Guidelines for Americans, 2010  USDA's MyPlate  ASA Prophylaxis  Lung CA Screening    Garrett Sotelo MD  [facility]    Identified Health Risks:

## 2021-08-17 NOTE — LETTER
August 19, 2021      Alexandro Lee  [id number] [address] [id number]        Dear ,    We are writing to inform you of your test results.    The following letter pertains to your most recent diagnostic tests:     -Your cholesterol is a little better than last year.       -The liver tests and blood counts look OK.         Follow up:  Schedule an appointment for a physical examination with fasting blood tests in one year's time, or return sooner if new questions, symptoms or problems arise.       Resulted Orders   Lipid panel reflex to direct LDL Fasting   Result Value Ref Range    Cholesterol 212 (H) <200 mg/dL      Comment:      Age 0-19 years  Desirable: <170 mg/dL  Borderline high:  170-199 mg/dl  High:            >199 mg/dl    Age 20 years and older  Desirable: <200 mg/dL    Triglycerides 63 <150 mg/dL      Comment:      0-9 years:  Normal:    Less than 75 mg/dL  Borderline high:  75-99 mg/dL  High:             Greater than or equal to 100 mg/dL    0-19 years:  Normal:    Less than 90 mg/dL  Borderline high:   mg/dL  High:             Greater than or equal to 130 mg/dL    20 years and older:  Normal:    Less than 150 mg/dL  Borderline high:  150-199 mg/dL  High:             200-499 mg/dL  Very high:   Greater than or equal to 500 mg/dL    Direct Measure HDL 82 >=40 mg/dL      Comment:      0-19 years:       Greater than or equal to 45 mg/dL   Low: Less than 40 mg/dL   Borderline low: 40-44 mg/dL     20 years and older:   Female: Greater than or equal to 50 mg/dL   Male:   Greater than or equal to 40 mg/dL         LDL Cholesterol Calculated 117 (H) <=100 mg/dL      Comment:      Age 0-19 years:  Desirable: 0-110 mg/dL   Borderline high: 110-129 mg/dL   High: >= 130 mg/dL    Age 20 years and older:  Desirable: <100mg/dL  Above desirable: 100-129 mg/dL   Borderline high: 130-159 mg/dL   High: 160-189 mg/dL   Very high: >= 190 mg/dL    Non HDL Cholesterol 130 (H) <130 mg/dL      Comment:       0-19 years:  Desirable:          Less than 120 mg/dL  Borderline high:   120-144 mg/dL  High:                   Greater than or equal to 145 mg/dL    20 years and older:  Desirable:          130 mg/dL  Above Desirable: 130-159 mg/dL  Borderline high:   160-189 mg/dL  High:               190-219 mg/dL  Very high:     Greater than or equal to 220 mg/dL    Patient Fasting > 8hrs? Yes    INR   Result Value Ref Range    INR 1.09 0.85 - 1.15      Comment:      Effective 7/11/2021, the reference range for this assay has changed.       If you have any questions or concerns, please call the clinic at the number listed above.       Sincerely,      Garrett Sotelo MD

## 2021-08-18 ENCOUNTER — HOSPITAL ENCOUNTER (OUTPATIENT)
Dept: ULTRASOUND IMAGING | Facility: CLINIC | Age: 71
Discharge: HOME | End: 2021-08-18
Attending: NURSE PRACTITIONER | Admitting: NURSE PRACTITIONER
Payer: MEDICARE

## 2021-08-18 DIAGNOSIS — K74.69 LIVER, CIRRHOSIS, PORTAL (H): ICD-10-CM

## 2021-08-18 LAB
ALBUMIN SERPL-MCNC: 4.3 G/DL (ref 3.4–5)
ALP SERPL-CCNC: 65 U/L (ref 40–150)
ALT SERPL W P-5'-P-CCNC: 36 U/L (ref 0–70)
ANION GAP SERPL CALCULATED.3IONS-SCNC: 2 MMOL/L (ref 3–14)
AST SERPL W P-5'-P-CCNC: 30 U/L (ref 0–45)
BILIRUB SERPL-MCNC: 0.9 MG/DL (ref 0.2–1.3)
BUN SERPL-MCNC: 12 MG/DL (ref 7–30)
CALCIUM SERPL-MCNC: 9.4 MG/DL (ref 8.5–10.1)
CHLORIDE BLD-SCNC: 104 MMOL/L (ref 94–109)
CHOLEST SERPL-MCNC: 212 MG/DL
CO2 SERPL-SCNC: 31 MMOL/L (ref 20–32)
CREAT SERPL-MCNC: 0.71 MG/DL (ref 0.66–1.25)
FASTING STATUS PATIENT QL REPORTED: YES
GFR SERPL CREATININE-BSD FRML MDRD: >90 ML/MIN/1.73M2
GLUCOSE BLD-MCNC: 107 MG/DL (ref 70–99)
HDLC SERPL-MCNC: 82 MG/DL
LDLC SERPL CALC-MCNC: 117 MG/DL
NONHDLC SERPL-MCNC: 130 MG/DL
POTASSIUM BLD-SCNC: 3.7 MMOL/L (ref 3.4–5.3)
PROT SERPL-MCNC: 8.4 G/DL (ref 6.8–8.8)
SODIUM SERPL-SCNC: 137 MMOL/L (ref 133–144)
TRIGL SERPL-MCNC: 63 MG/DL

## 2021-08-18 PROCEDURE — 76705 ECHO EXAM OF ABDOMEN: CPT

## 2021-08-18 ASSESSMENT — ASTHMA QUESTIONNAIRES: ACT_TOTALSCORE: 17

## 2021-08-18 NOTE — RESULT ENCOUNTER NOTE
The following letter pertains to your most recent diagnostic tests:    -Your cholesterol is a little better than last year.      -The liver tests and blood counts look OK.        Follow up:  Schedule an appointment for a physical examination with fasting blood tests in one year's time, or return sooner if new questions, symptoms or problems arise.        Sincerely,    Dr. Sotelo

## 2021-12-16 DIAGNOSIS — J45.40 MODERATE PERSISTENT ASTHMA WITHOUT COMPLICATION: ICD-10-CM

## 2021-12-17 RX ORDER — MONTELUKAST SODIUM 10 MG/1
TABLET ORAL
Qty: 90 TABLET | Refills: 3 | Status: SHIPPED | OUTPATIENT
Start: 2021-12-17 | End: 2022-12-02

## 2021-12-17 NOTE — TELEPHONE ENCOUNTER
Routing refill request to provider for review/approval because:  Last ACT score <20     ACT Total Scores 1/2/2020 1/2/2020 8/17/2021   ACT TOTAL SCORE (Goal Greater than or Equal to 20) 17 20 17   In the past 12 months, how many times did you visit the emergency room for your asthma without being admitted to the hospital? 0 0 0   In the past 12 months, how many times were you hospitalized overnight because of your asthma? 0 0 0

## 2022-03-22 DIAGNOSIS — I10 BENIGN ESSENTIAL HYPERTENSION: ICD-10-CM

## 2022-03-23 NOTE — TELEPHONE ENCOUNTER
Hydralazine 50 mg tablet    Summary: Take 1 tablet (50 mg) by mouth 2 times daily, Disp-180 tablet, R-3, E-Prescribe   Dose, Route, Frequency: 50 mg, Oral, 2 TIMES DAILY  Start: 1/20/2021  Ord/Sold: 1/20/2021

## 2022-03-24 RX ORDER — HYDRALAZINE HYDROCHLORIDE 50 MG/1
50 TABLET, FILM COATED ORAL 2 TIMES DAILY
Qty: 180 TABLET | Refills: 1 | Status: SHIPPED | OUTPATIENT
Start: 2022-03-24 | End: 2022-10-26

## 2022-03-24 NOTE — TELEPHONE ENCOUNTER
Routing refill request to provider for review/approval because:  Vasodilators Failed 03/23/2022 03:21 PM   Protocol Details  Most recent encounter is not a hospital encounter. Patient has recent (12 mos) or    Bri Diaz RN

## 2022-04-24 ENCOUNTER — APPOINTMENT (OUTPATIENT)
Dept: GENERAL RADIOLOGY | Facility: CLINIC | Age: 72
DRG: 202 | End: 2022-04-24
Attending: EMERGENCY MEDICINE
Payer: MEDICARE

## 2022-04-24 ENCOUNTER — NURSE TRIAGE (OUTPATIENT)
Dept: NURSING | Facility: CLINIC | Age: 72
End: 2022-04-24
Payer: MEDICARE

## 2022-04-24 ENCOUNTER — HOSPITAL ENCOUNTER (INPATIENT)
Facility: CLINIC | Age: 72
LOS: 3 days | Discharge: HOME | DRG: 202 | End: 2022-04-28
Attending: EMERGENCY MEDICINE | Admitting: HOSPITALIST
Payer: MEDICARE

## 2022-04-24 DIAGNOSIS — J45.901 EXACERBATION OF ASTHMA, UNSPECIFIED ASTHMA SEVERITY, UNSPECIFIED WHETHER PERSISTENT: ICD-10-CM

## 2022-04-24 DIAGNOSIS — J20.9 ACUTE BRONCHITIS, UNSPECIFIED ORGANISM: ICD-10-CM

## 2022-04-24 LAB
ALBUMIN SERPL-MCNC: 2.9 G/DL (ref 3.4–5)
ALP SERPL-CCNC: 105 U/L (ref 40–150)
ALT SERPL W P-5'-P-CCNC: 96 U/L (ref 0–70)
ANION GAP SERPL CALCULATED.3IONS-SCNC: 5 MMOL/L (ref 3–14)
AST SERPL W P-5'-P-CCNC: 84 U/L (ref 0–45)
BASOPHILS # BLD AUTO: 0.1 10E3/UL (ref 0–0.2)
BASOPHILS NFR BLD AUTO: 0 %
BILIRUB SERPL-MCNC: 0.7 MG/DL (ref 0.2–1.3)
BUN SERPL-MCNC: 16 MG/DL (ref 7–30)
CALCIUM SERPL-MCNC: 9.4 MG/DL (ref 8.5–10.1)
CHLORIDE BLD-SCNC: 95 MMOL/L (ref 94–109)
CO2 SERPL-SCNC: 30 MMOL/L (ref 20–32)
CREAT SERPL-MCNC: 0.37 MG/DL (ref 0.66–1.25)
EOSINOPHIL # BLD AUTO: 0 10E3/UL (ref 0–0.7)
EOSINOPHIL NFR BLD AUTO: 0 %
ERYTHROCYTE [DISTWIDTH] IN BLOOD BY AUTOMATED COUNT: 12.1 % (ref 10–15)
FLUAV RNA SPEC QL NAA+PROBE: NEGATIVE
FLUBV RNA RESP QL NAA+PROBE: NEGATIVE
GFR SERPL CREATININE-BSD FRML MDRD: >90 ML/MIN/1.73M2
GLUCOSE BLD-MCNC: 120 MG/DL (ref 70–99)
HCT VFR BLD AUTO: 46.4 % (ref 40–53)
HGB BLD-MCNC: 15.5 G/DL (ref 13.3–17.7)
HOLD SPECIMEN: NORMAL
HOLD SPECIMEN: NORMAL
IMM GRANULOCYTES # BLD: 0.3 10E3/UL
IMM GRANULOCYTES NFR BLD: 1 %
LYMPHOCYTES # BLD AUTO: 1.8 10E3/UL (ref 0.8–5.3)
LYMPHOCYTES NFR BLD AUTO: 6 %
MCH RBC QN AUTO: 29.4 PG (ref 26.5–33)
MCHC RBC AUTO-ENTMCNC: 33.4 G/DL (ref 31.5–36.5)
MCV RBC AUTO: 88 FL (ref 78–100)
MONOCYTES # BLD AUTO: 1.1 10E3/UL (ref 0–1.3)
MONOCYTES NFR BLD AUTO: 4 %
NEUTROPHILS # BLD AUTO: 25.9 10E3/UL (ref 1.6–8.3)
NEUTROPHILS NFR BLD AUTO: 89 %
NRBC # BLD AUTO: 0 10E3/UL
NRBC BLD AUTO-RTO: 0 /100
NT-PROBNP SERPL-MCNC: 215 PG/ML (ref 0–900)
PLATELET # BLD AUTO: 423 10E3/UL (ref 150–450)
POTASSIUM BLD-SCNC: 4.4 MMOL/L (ref 3.4–5.3)
PROT SERPL-MCNC: 8.9 G/DL (ref 6.8–8.8)
RBC # BLD AUTO: 5.28 10E6/UL (ref 4.4–5.9)
RSV RNA SPEC NAA+PROBE: NEGATIVE
SARS-COV-2 RNA RESP QL NAA+PROBE: NEGATIVE
SODIUM SERPL-SCNC: 130 MMOL/L (ref 133–144)
TROPONIN I SERPL HS-MCNC: 6 NG/L
WBC # BLD AUTO: 29.2 10E3/UL (ref 4–11)

## 2022-04-24 PROCEDURE — 250N000011 HC RX IP 250 OP 636: Performed by: EMERGENCY MEDICINE

## 2022-04-24 PROCEDURE — 250N000009 HC RX 250

## 2022-04-24 PROCEDURE — 83880 ASSAY OF NATRIURETIC PEPTIDE: CPT | Performed by: EMERGENCY MEDICINE

## 2022-04-24 PROCEDURE — 84484 ASSAY OF TROPONIN QUANT: CPT | Performed by: EMERGENCY MEDICINE

## 2022-04-24 PROCEDURE — 85025 COMPLETE CBC W/AUTO DIFF WBC: CPT | Performed by: EMERGENCY MEDICINE

## 2022-04-24 PROCEDURE — 96368 THER/DIAG CONCURRENT INF: CPT

## 2022-04-24 PROCEDURE — 999N000157 HC STATISTIC RCP TIME EA 10 MIN

## 2022-04-24 PROCEDURE — 96365 THER/PROPH/DIAG IV INF INIT: CPT

## 2022-04-24 PROCEDURE — 99285 EMERGENCY DEPT VISIT HI MDM: CPT | Mod: 25

## 2022-04-24 PROCEDURE — 96366 THER/PROPH/DIAG IV INF ADDON: CPT

## 2022-04-24 PROCEDURE — G0378 HOSPITAL OBSERVATION PER HR: HCPCS

## 2022-04-24 PROCEDURE — 94640 AIRWAY INHALATION TREATMENT: CPT

## 2022-04-24 PROCEDURE — 36415 COLL VENOUS BLD VENIPUNCTURE: CPT | Performed by: EMERGENCY MEDICINE

## 2022-04-24 PROCEDURE — 258N000003 HC RX IP 258 OP 636: Performed by: EMERGENCY MEDICINE

## 2022-04-24 PROCEDURE — 250N000013 HC RX MED GY IP 250 OP 250 PS 637: Performed by: HOSPITALIST

## 2022-04-24 PROCEDURE — 80053 COMPREHEN METABOLIC PANEL: CPT | Performed by: EMERGENCY MEDICINE

## 2022-04-24 PROCEDURE — 250N000009 HC RX 250: Performed by: EMERGENCY MEDICINE

## 2022-04-24 PROCEDURE — 99218 PR INITIAL OBSERVATION CARE,LEVEL I: CPT | Performed by: HOSPITALIST

## 2022-04-24 PROCEDURE — 71045 X-RAY EXAM CHEST 1 VIEW: CPT

## 2022-04-24 PROCEDURE — 87637 SARSCOV2&INF A&B&RSV AMP PRB: CPT | Performed by: EMERGENCY MEDICINE

## 2022-04-24 PROCEDURE — C9803 HOPD COVID-19 SPEC COLLECT: HCPCS

## 2022-04-24 RX ORDER — IPRATROPIUM BROMIDE AND ALBUTEROL SULFATE 2.5; .5 MG/3ML; MG/3ML
3 SOLUTION RESPIRATORY (INHALATION)
Status: DISCONTINUED | OUTPATIENT
Start: 2022-04-24 | End: 2022-04-28 | Stop reason: HOSPADM

## 2022-04-24 RX ORDER — ALBUTEROL SULFATE 0.83 MG/ML
2.5 SOLUTION RESPIRATORY (INHALATION)
Status: DISCONTINUED | OUTPATIENT
Start: 2022-04-24 | End: 2022-04-26

## 2022-04-24 RX ORDER — PREDNISONE 20 MG/1
40 TABLET ORAL DAILY
Status: DISCONTINUED | OUTPATIENT
Start: 2022-04-25 | End: 2022-04-28 | Stop reason: HOSPADM

## 2022-04-24 RX ORDER — ACETAMINOPHEN 650 MG/1
650 SUPPOSITORY RECTAL EVERY 6 HOURS PRN
Status: DISCONTINUED | OUTPATIENT
Start: 2022-04-24 | End: 2022-04-28 | Stop reason: HOSPADM

## 2022-04-24 RX ORDER — AMOXICILLIN 250 MG
2 CAPSULE ORAL 2 TIMES DAILY
Status: DISCONTINUED | OUTPATIENT
Start: 2022-04-24 | End: 2022-04-28 | Stop reason: HOSPADM

## 2022-04-24 RX ORDER — ACETAMINOPHEN 325 MG/1
650 TABLET ORAL EVERY 6 HOURS PRN
Status: DISCONTINUED | OUTPATIENT
Start: 2022-04-24 | End: 2022-04-28 | Stop reason: HOSPADM

## 2022-04-24 RX ORDER — ATENOLOL 50 MG/1
50 TABLET ORAL 2 TIMES DAILY
Status: DISCONTINUED | OUTPATIENT
Start: 2022-04-25 | End: 2022-04-25

## 2022-04-24 RX ORDER — FELODIPINE 5 MG/1
10 TABLET, EXTENDED RELEASE ORAL DAILY
Status: DISCONTINUED | OUTPATIENT
Start: 2022-04-25 | End: 2022-04-28 | Stop reason: HOSPADM

## 2022-04-24 RX ORDER — CETIRIZINE HYDROCHLORIDE 10 MG/1
10 TABLET ORAL EVERY EVENING
Status: DISCONTINUED | OUTPATIENT
Start: 2022-04-25 | End: 2022-04-28 | Stop reason: HOSPADM

## 2022-04-24 RX ORDER — GUAIFENESIN AND DEXTROMETHORPHAN HYDROBROMIDE 600; 30 MG/1; MG/1
2 TABLET, EXTENDED RELEASE ORAL 2 TIMES DAILY PRN
Status: DISCONTINUED | OUTPATIENT
Start: 2022-04-24 | End: 2022-04-28 | Stop reason: HOSPADM

## 2022-04-24 RX ORDER — AMOXICILLIN 250 MG
1 CAPSULE ORAL 2 TIMES DAILY
Status: DISCONTINUED | OUTPATIENT
Start: 2022-04-24 | End: 2022-04-28 | Stop reason: HOSPADM

## 2022-04-24 RX ORDER — IPRATROPIUM BROMIDE AND ALBUTEROL SULFATE 2.5; .5 MG/3ML; MG/3ML
SOLUTION RESPIRATORY (INHALATION)
Status: COMPLETED
Start: 2022-04-24 | End: 2022-04-24

## 2022-04-24 RX ORDER — HYDRALAZINE HYDROCHLORIDE 50 MG/1
50 TABLET, FILM COATED ORAL 2 TIMES DAILY
Status: DISCONTINUED | OUTPATIENT
Start: 2022-04-25 | End: 2022-04-28 | Stop reason: HOSPADM

## 2022-04-24 RX ORDER — MAGNESIUM SULFATE HEPTAHYDRATE 40 MG/ML
2 INJECTION, SOLUTION INTRAVENOUS ONCE
Status: COMPLETED | OUTPATIENT
Start: 2022-04-24 | End: 2022-04-24

## 2022-04-24 RX ORDER — ONDANSETRON 4 MG/1
4 TABLET, ORALLY DISINTEGRATING ORAL EVERY 6 HOURS PRN
Status: DISCONTINUED | OUTPATIENT
Start: 2022-04-24 | End: 2022-04-28 | Stop reason: HOSPADM

## 2022-04-24 RX ORDER — AZITHROMYCIN 500 MG/5ML
500 INJECTION, POWDER, LYOPHILIZED, FOR SOLUTION INTRAVENOUS EVERY 24 HOURS
Status: DISCONTINUED | OUTPATIENT
Start: 2022-04-24 | End: 2022-04-24

## 2022-04-24 RX ORDER — IPRATROPIUM BROMIDE AND ALBUTEROL SULFATE 2.5; .5 MG/3ML; MG/3ML
3 SOLUTION RESPIRATORY (INHALATION)
Status: COMPLETED | OUTPATIENT
Start: 2022-04-24 | End: 2022-04-24

## 2022-04-24 RX ORDER — ONDANSETRON 2 MG/ML
4 INJECTION INTRAMUSCULAR; INTRAVENOUS EVERY 6 HOURS PRN
Status: DISCONTINUED | OUTPATIENT
Start: 2022-04-24 | End: 2022-04-28 | Stop reason: HOSPADM

## 2022-04-24 RX ORDER — AZITHROMYCIN 250 MG/1
500 TABLET, FILM COATED ORAL DAILY
Status: DISCONTINUED | OUTPATIENT
Start: 2022-04-25 | End: 2022-04-28 | Stop reason: HOSPADM

## 2022-04-24 RX ORDER — FLUTICASONE PROPIONATE 50 MCG
1-2 SPRAY, SUSPENSION (ML) NASAL DAILY
Status: DISCONTINUED | OUTPATIENT
Start: 2022-04-25 | End: 2022-04-28 | Stop reason: HOSPADM

## 2022-04-24 RX ORDER — MONTELUKAST SODIUM 10 MG/1
10 TABLET ORAL AT BEDTIME
Status: DISCONTINUED | OUTPATIENT
Start: 2022-04-24 | End: 2022-04-28 | Stop reason: HOSPADM

## 2022-04-24 RX ADMIN — ALBUTEROL SULFATE 2.5 MG: 2.5 SOLUTION RESPIRATORY (INHALATION) at 19:00

## 2022-04-24 RX ADMIN — MAGNESIUM SULFATE HEPTAHYDRATE 2 G: 40 INJECTION, SOLUTION INTRAVENOUS at 19:00

## 2022-04-24 RX ADMIN — IPRATROPIUM BROMIDE AND ALBUTEROL SULFATE 3 ML: 2.5; .5 SOLUTION RESPIRATORY (INHALATION) at 17:18

## 2022-04-24 RX ADMIN — AZITHROMYCIN MONOHYDRATE 500 MG: 500 INJECTION, POWDER, LYOPHILIZED, FOR SOLUTION INTRAVENOUS at 17:41

## 2022-04-24 RX ADMIN — MONTELUKAST 10 MG: 10 TABLET, FILM COATED ORAL at 23:02

## 2022-04-24 RX ADMIN — SENNOSIDES AND DOCUSATE SODIUM 1 TABLET: 50; 8.6 TABLET ORAL at 23:01

## 2022-04-24 RX ADMIN — SODIUM CHLORIDE 500 ML: 9 INJECTION, SOLUTION INTRAVENOUS at 17:12

## 2022-04-24 RX ADMIN — IPRATROPIUM BROMIDE AND ALBUTEROL SULFATE 3 ML: 2.5; .5 SOLUTION RESPIRATORY (INHALATION) at 17:26

## 2022-04-24 RX ADMIN — IPRATROPIUM BROMIDE AND ALBUTEROL SULFATE 3 ML: 2.5; .5 SOLUTION RESPIRATORY (INHALATION) at 17:09

## 2022-04-24 ASSESSMENT — ENCOUNTER SYMPTOMS
SHORTNESS OF BREATH: 1
WHEEZING: 1

## 2022-04-24 NOTE — TELEPHONE ENCOUNTER
Triage call    Wife calling to report patient has asthma and has been having asthma attacks he has  no energy  The got a oximeter at the  Pharmacy and it is runnig  81-84.   His color is dusky.    Per protocol  Go to ED now.  Care advice given.  Verbalizes understanding and agrees with plan.    Tyesha Patterson RN   [facility] Nurse Advisor  4:16 PM 4/24/2022        Reason for Disposition    Nursing judgment or information in reference    Additional Information    Negative: Nursing judgment, per information in Reference    Negative: Information only call about a Well Adult (no illness or injury)    Protocols used: NO GUIDELINE [id number]

## 2022-04-24 NOTE — ED PROVIDER NOTES
History   Chief Complaint:  Dyspnea     HPI   Alexandro Lee is a 72 year old male with a history of asthma who presents with dyspnea.  The patient states that he has had a cold for about the last 2 weeks but for the last three weeks he has been feeling more short of breath.  He states he has been trying to use his inhalers at home but felt so short of breath today that he called EMS.  The patient was given 125 mg of Solu-Medrol and 1 DuoNeb by EMS on route.  On arrival in the ED he still feels quite short of breath. He states his symptoms typically worsen with abrupt weather changes like the one that happened yesterday. He denies chest pain.     Review of Systems   Respiratory: Positive for shortness of breath and wheezing.    Cardiovascular: Negative for chest pain.   All other systems reviewed and are negative.    Allergies:  Amoxicillin  Lisinopril    Medications:  Advair Diskus  Albuterol  Aspirin 81 MG  Atenolol  Azelastine  Cetirizine  Plendil  Flonase  Hydralazine   Incruse Ellipta   Montelukast   Omeprazole     Past Medical History:     Arthritis  Asthma  Hypertension  Hyperlipidemia  Cirrhosis  Chronic cough     Past Surgical History:    Arthroplasty hip anterior, left  ACL and PCL repair, left knee    Family History:    Father: Diabetes, heart disease    Social History:  The patient was accompanied to the ER by EMS personnel    Physical Exam     Patient Vitals for the past 24 hrs:   BP Temp Temp src Pulse Resp SpO2 Weight   04/24/22 1900 (!) 146/85 -- -- 104 (!) 31 94 % --   04/24/22 1845 (!) 149/93 -- -- 102 (!) 37 94 % --   04/24/22 1830 (!) 153/87 -- -- 108 (!) 35 93 % --   04/24/22 1815 (!) 166/87 -- -- 112 27 93 % --   04/24/22 1800 (!) 158/94 -- -- 111 (!) 39 93 % --   04/24/22 1745 (!) 173/101 -- -- 111 (!) 38 93 % --   04/24/22 1730 (!) 160/85 -- -- 108 (!) 34 98 % --   04/24/22 1729 -- 98  F (36.7  C) Oral -- -- -- 90.7 kg (200 lb)   04/24/22 1725 (!) 164/88 -- -- 112 (!) 39 98 % --   04/24/22  1723 (!) 188/96 -- -- 112 (!) 35 98 % --       Physical Exam  Constitutional: Patient is well appearing. Speaks in 3-4 word sentences.  Head: Atraumatic.  Eyes: Conjunctivae are normal. No scleral icterus.  Neck: Normal range of motion. Neck supple.   Cardiovascular: Normal rate, regular rhythm, normal heart sounds and intact distal pulses.   Pulmonary/Chest: In slight respiratory distress. Coarse bronchial breath sounds with mid to end expiratory wheeze, equal.  Abdominal: Soft. Bowel sounds are normal. No distension. No tenderness. No rebound or guarding.   Musculoskeletal: Normal range of motion. No edema or tenderness.   Neurological: Alert and orientated to person, place, and time. No observable focal neuro deficit  Skin: Warm and dry. No rash noted. Not diaphoretic.     Emergency Department Course   ECG  ECG obtained at 1713, ECG read at 1733  Sinus tachycardia   Nonspecific ST and T wave abnormality   Abnormal ECG   Rate 114 bpm. MI interval 174 ms. QRS duration 76 ms. QT/QTc 324/446 ms. P-R-T axes 64 87 49.     Imaging:    XR Chest Port 1 View   Final Result   IMPRESSION: Heart and mediastinal size are normal. There is some pulmonary vascular congestion. Streaky opacity involving the lung bases may reflect some overlying atelectasis or sequela of infection. No pleural effusion or pneumothorax is seen.         The above imaging workup was performed.   Report per radiology    Laboratory:  Labs Ordered and Resulted from Time of ED Arrival to Time of ED Departure   COMPREHENSIVE METABOLIC PANEL - Abnormal       Result Value    Sodium 130 (*)     Potassium 4.4      Chloride 95      Carbon Dioxide (CO2) 30      Anion Gap 5      Urea Nitrogen 16      Creatinine 0.37 (*)     Calcium 9.4      Glucose 120 (*)     Alkaline Phosphatase 105      AST 84 (*)     ALT 96 (*)     Protein Total 8.9 (*)     Albumin 2.9 (*)     Bilirubin Total 0.7      GFR Estimate >90     CBC WITH PLATELETS AND DIFFERENTIAL - Abnormal    WBC  Count 29.2 (*)     RBC Count 5.28      Hemoglobin 15.5      Hematocrit 46.4      MCV 88      MCH 29.4      MCHC 33.4      RDW 12.1      Platelet Count 423      % Neutrophils 89      % Lymphocytes 6      % Monocytes 4      % Eosinophils 0      % Basophils 0      % Immature Granulocytes 1      NRBCs per 100 WBC 0      Absolute Neutrophils 25.9 (*)     Absolute Lymphocytes 1.8      Absolute Monocytes 1.1      Absolute Eosinophils 0.0      Absolute Basophils 0.1      Absolute Immature Granulocytes 0.3      Absolute NRBCs 0.0     TROPONIN I - Normal    Troponin I High Sensitivity 6     NT PROBNP INPATIENT - Normal    N terminal Pro BNP Inpatient 215     INFLUENZA A/B & SARS-COV2 PCR MULTIPLEX - Normal    Influenza A PCR Negative      Influenza B PCR Negative      RSV PCR Negative      SARS CoV2 PCR Negative        Emergency Department Course:    Reviewed:  I reviewed nursing notes, vitals and past medical history    Assessments:  1721 I obtained history and examined the patient as noted above.   1850 I rechecked the patient and explained findings.     Consults:  1905 I spoke with Dr. Matthews of the hospitalist service from [facility] regarding patient's presentation, findings, and plan of care.    Interventions:  1709: Duoneb, 3 mL, Nebulization  1712: Normal Saline, 0.5 liter, IV bolus   1718: Duoneb, 3 mL, Nebulization   1726: Duoneb, 3 mL, Nebulization  1741: Azithromycin, 500 mg, IV  1900: Albuterol, 2.5 mg, Nebulization  1900: Magnesium sulfate, 2 g, IV    Disposition:  The patient was admitted to the hospital under the care of Dr. Matthews.     Impression & Plan       Medical Decision Making:  Alexandro Lee is a 72 year old male who presents for evaluation of shortness of breath and wheezing.  Signs and symptoms are consistent with asthma exacerbation.  A broad differential was considered including foreign body, asthma, pneumonia, bronchitis, reactive airway disease, pneumothorax, cardiac equivalent, viral induced  wheezing, allergic phenomena, etc.  He feels improved after interventions here in ED but continues to be quite symptomatic with continued tachypnea and wheezing.  Chest x-ray was obtained and showed possible infection. Leukocytosis was noted on CBC but this may be due to possible infection versus pre-hospital steroids. Azithromycin was given as a precaution. Given the patients continued symptoms, I will hospitalize at this point for further cares due to risk in outpatient management.       Covid-19  Alexandro Lee was evaluated during a global COVID-19 pandemic, which necessitated consideration that the patient might be at risk for infection with the SARS-CoV-2 virus that causes COVID-19.   Applicable protocols for evaluation were followed during the patient's care.   COVID-19 was considered as part of the patient's evaluation. The plan for testing is:  a test was obtained during this visit.    Diagnosis:    ICD-10-CM    1. Exacerbation of asthma, unspecified asthma severity, unspecified whether persistent  J45.901    2. Acute bronchitis, unspecified organism  J20.9      Scribe Disclosure:  I, Ben Kline, am serving as a scribe at 5:08 PM on 4/24/2022 to document services personally performed by Rickey Marquez MD based on my observations and the provider's statements to me.         Rickey Marquez MD  04/24/22 [id number]

## 2022-04-24 NOTE — ED TRIAGE NOTES
Patient arrives with [facility]- patient has hx of asthma that SOB has been worsening over the last couple of days with green sputum with frequent cough. Medics gave 1 Duo neb and 125 mg of Solumedrol. Glucose 108. On arrival patient was low 80s on RA, placed on 6L to upper 90s and titrated to 4L NC.   Patient arrived with tachypnea, abdominal breathing and tachycardic. MD called in to room

## 2022-04-24 NOTE — ED NOTES
Bed: ED20  Expected date: 4/24/22  Expected time: 4:58 PM  Means of arrival: Ambulance  Comments:  Neelima [phone]  72M  SOB

## 2022-04-25 LAB
ATRIAL RATE - MUSE: 114 BPM
BASE EXCESS BLDV CALC-SCNC: 8.8 MMOL/L (ref -7.7–1.9)
BASOPHILS # BLD AUTO: 0 10E3/UL (ref 0–0.2)
BASOPHILS NFR BLD AUTO: 0 %
C PNEUM DNA SPEC QL NAA+PROBE: NOT DETECTED
DIASTOLIC BLOOD PRESSURE - MUSE: NORMAL MMHG
EOSINOPHIL # BLD AUTO: 0 10E3/UL (ref 0–0.7)
EOSINOPHIL NFR BLD AUTO: 0 %
ERYTHROCYTE [DISTWIDTH] IN BLOOD BY AUTOMATED COUNT: 12.3 % (ref 10–15)
FLUAV H1 2009 PAND RNA SPEC QL NAA+PROBE: NOT DETECTED
FLUAV H1 RNA SPEC QL NAA+PROBE: NOT DETECTED
FLUAV H3 RNA SPEC QL NAA+PROBE: NOT DETECTED
FLUAV RNA SPEC QL NAA+PROBE: NOT DETECTED
FLUBV RNA SPEC QL NAA+PROBE: NOT DETECTED
HADV DNA SPEC QL NAA+PROBE: NOT DETECTED
HCO3 BLDV-SCNC: 35 MMOL/L (ref 21–28)
HCOV PNL SPEC NAA+PROBE: NOT DETECTED
HCT VFR BLD AUTO: 41.2 % (ref 40–53)
HGB BLD-MCNC: 14 G/DL (ref 13.3–17.7)
HMPV RNA SPEC QL NAA+PROBE: NOT DETECTED
HOLD SPECIMEN: NORMAL
HPIV1 RNA SPEC QL NAA+PROBE: NOT DETECTED
HPIV2 RNA SPEC QL NAA+PROBE: NOT DETECTED
HPIV3 RNA SPEC QL NAA+PROBE: NOT DETECTED
HPIV4 RNA SPEC QL NAA+PROBE: NOT DETECTED
IMM GRANULOCYTES # BLD: 0.3 10E3/UL
IMM GRANULOCYTES NFR BLD: 2 %
INTERPRETATION ECG - MUSE: NORMAL
LYMPHOCYTES # BLD AUTO: 0.6 10E3/UL (ref 0.8–5.3)
LYMPHOCYTES NFR BLD AUTO: 3 %
M PNEUMO DNA SPEC QL NAA+PROBE: NOT DETECTED
MCH RBC QN AUTO: 30 PG (ref 26.5–33)
MCHC RBC AUTO-ENTMCNC: 34 G/DL (ref 31.5–36.5)
MCV RBC AUTO: 88 FL (ref 78–100)
MONOCYTES # BLD AUTO: 0.4 10E3/UL (ref 0–1.3)
MONOCYTES NFR BLD AUTO: 2 %
NEUTROPHILS # BLD AUTO: 16.7 10E3/UL (ref 1.6–8.3)
NEUTROPHILS NFR BLD AUTO: 93 %
NRBC # BLD AUTO: 0 10E3/UL
NRBC BLD AUTO-RTO: 0 /100
O2/TOTAL GAS SETTING VFR VENT: 4 %
OXYHGB MFR BLDV: 91 % (ref 70–75)
P AXIS - MUSE: 64 DEGREES
PCO2 BLDV: 50 MM HG (ref 40–50)
PH BLDV: 7.45 [PH] (ref 7.32–7.43)
PLATELET # BLD AUTO: 309 10E3/UL (ref 150–450)
PO2 BLDV: 60 MM HG (ref 25–47)
PR INTERVAL - MUSE: 174 MS
PROCALCITONIN SERPL-MCNC: 0.12 NG/ML
QRS DURATION - MUSE: 76 MS
QT - MUSE: 324 MS
QTC - MUSE: 446 MS
R AXIS - MUSE: 87 DEGREES
RBC # BLD AUTO: 4.66 10E6/UL (ref 4.4–5.9)
RSV RNA SPEC QL NAA+PROBE: NOT DETECTED
RSV RNA SPEC QL NAA+PROBE: NOT DETECTED
RV+EV RNA SPEC QL NAA+PROBE: NOT DETECTED
SYSTOLIC BLOOD PRESSURE - MUSE: NORMAL MMHG
T AXIS - MUSE: 49 DEGREES
VENTRICULAR RATE- MUSE: 114 BPM
WBC # BLD AUTO: 18 10E3/UL (ref 4–11)

## 2022-04-25 PROCEDURE — 36415 COLL VENOUS BLD VENIPUNCTURE: CPT | Performed by: EMERGENCY MEDICINE

## 2022-04-25 PROCEDURE — 94640 AIRWAY INHALATION TREATMENT: CPT

## 2022-04-25 PROCEDURE — 120N000004 HC R&B MS OVERFLOW

## 2022-04-25 PROCEDURE — 250N000013 HC RX MED GY IP 250 OP 250 PS 637: Performed by: HOSPITALIST

## 2022-04-25 PROCEDURE — 250N000013 HC RX MED GY IP 250 OP 250 PS 637: Performed by: PHYSICIAN ASSISTANT

## 2022-04-25 PROCEDURE — 99233 SBSQ HOSP IP/OBS HIGH 50: CPT | Performed by: PHYSICIAN ASSISTANT

## 2022-04-25 PROCEDURE — 250N000012 HC RX MED GY IP 250 OP 636 PS 637: Performed by: HOSPITALIST

## 2022-04-25 PROCEDURE — 85025 COMPLETE CBC W/AUTO DIFF WBC: CPT | Performed by: HOSPITALIST

## 2022-04-25 PROCEDURE — 94640 AIRWAY INHALATION TREATMENT: CPT | Mod: 76

## 2022-04-25 PROCEDURE — G0378 HOSPITAL OBSERVATION PER HR: HCPCS

## 2022-04-25 PROCEDURE — 250N000011 HC RX IP 250 OP 636: Performed by: PHYSICIAN ASSISTANT

## 2022-04-25 PROCEDURE — 250N000009 HC RX 250: Performed by: HOSPITALIST

## 2022-04-25 PROCEDURE — 84145 PROCALCITONIN (PCT): CPT | Performed by: PHYSICIAN ASSISTANT

## 2022-04-25 PROCEDURE — 82805 BLOOD GASES W/O2 SATURATION: CPT | Performed by: EMERGENCY MEDICINE

## 2022-04-25 PROCEDURE — 36415 COLL VENOUS BLD VENIPUNCTURE: CPT | Performed by: HOSPITALIST

## 2022-04-25 PROCEDURE — 999N000157 HC STATISTIC RCP TIME EA 10 MIN

## 2022-04-25 PROCEDURE — 87486 CHLMYD PNEUM DNA AMP PROBE: CPT | Performed by: PHYSICIAN ASSISTANT

## 2022-04-25 RX ORDER — ENOXAPARIN SODIUM 100 MG/ML
40 INJECTION SUBCUTANEOUS EVERY 24 HOURS
Status: DISCONTINUED | OUTPATIENT
Start: 2022-04-25 | End: 2022-04-28 | Stop reason: HOSPADM

## 2022-04-25 RX ORDER — SODIUM CHLORIDE 9 MG/ML
INJECTION, SOLUTION INTRAVENOUS CONTINUOUS
Status: DISCONTINUED | OUTPATIENT
Start: 2022-04-26 | End: 2022-04-26

## 2022-04-25 RX ORDER — ATENOLOL 50 MG/1
100 TABLET ORAL 2 TIMES DAILY
Status: DISCONTINUED | OUTPATIENT
Start: 2022-04-25 | End: 2022-04-28 | Stop reason: HOSPADM

## 2022-04-25 RX ADMIN — HYDRALAZINE HYDROCHLORIDE 50 MG: 50 TABLET, FILM COATED ORAL at 08:45

## 2022-04-25 RX ADMIN — ATENOLOL 50 MG: 50 TABLET ORAL at 00:00

## 2022-04-25 RX ADMIN — CETIRIZINE HYDROCHLORIDE 10 MG: 10 TABLET, FILM COATED ORAL at 21:09

## 2022-04-25 RX ADMIN — ACETAMINOPHEN 650 MG: 325 TABLET, FILM COATED ORAL at 21:09

## 2022-04-25 RX ADMIN — GUAIFENESIN AND DEXTROMETHORPHAN HYDROBROMIDE 2 TABLET: 600; 30 TABLET, EXTENDED RELEASE ORAL at 21:09

## 2022-04-25 RX ADMIN — HYDRALAZINE HYDROCHLORIDE 50 MG: 50 TABLET, FILM COATED ORAL at 00:00

## 2022-04-25 RX ADMIN — MONTELUKAST 10 MG: 10 TABLET, FILM COATED ORAL at 21:09

## 2022-04-25 RX ADMIN — ATENOLOL 50 MG: 50 TABLET ORAL at 08:46

## 2022-04-25 RX ADMIN — AZITHROMYCIN MONOHYDRATE 500 MG: 250 TABLET ORAL at 08:45

## 2022-04-25 RX ADMIN — IPRATROPIUM BROMIDE AND ALBUTEROL SULFATE 3 ML: 2.5; .5 SOLUTION RESPIRATORY (INHALATION) at 20:51

## 2022-04-25 RX ADMIN — IPRATROPIUM BROMIDE AND ALBUTEROL SULFATE 3 ML: 2.5; .5 SOLUTION RESPIRATORY (INHALATION) at 07:32

## 2022-04-25 RX ADMIN — ATENOLOL 50 MG: 50 TABLET ORAL at 21:08

## 2022-04-25 RX ADMIN — FELODIPINE 10 MG: 5 TABLET, EXTENDED RELEASE ORAL at 08:45

## 2022-04-25 RX ADMIN — CETIRIZINE HYDROCHLORIDE 10 MG: 10 TABLET, FILM COATED ORAL at 00:00

## 2022-04-25 RX ADMIN — IPRATROPIUM BROMIDE AND ALBUTEROL SULFATE 3 ML: 2.5; .5 SOLUTION RESPIRATORY (INHALATION) at 12:19

## 2022-04-25 RX ADMIN — HYDRALAZINE HYDROCHLORIDE 50 MG: 50 TABLET, FILM COATED ORAL at 21:09

## 2022-04-25 RX ADMIN — GUAIFENESIN AND DEXTROMETHORPHAN HYDROBROMIDE 2 TABLET: 600; 30 TABLET, EXTENDED RELEASE ORAL at 06:12

## 2022-04-25 RX ADMIN — IPRATROPIUM BROMIDE AND ALBUTEROL SULFATE 3 ML: 2.5; .5 SOLUTION RESPIRATORY (INHALATION) at 00:20

## 2022-04-25 RX ADMIN — PREDNISONE 40 MG: 20 TABLET ORAL at 08:55

## 2022-04-25 RX ADMIN — IPRATROPIUM BROMIDE AND ALBUTEROL SULFATE 3 ML: 2.5; .5 SOLUTION RESPIRATORY (INHALATION) at 15:25

## 2022-04-25 RX ADMIN — FLUTICASONE PROPIONATE 2 SPRAY: 50 SPRAY, METERED NASAL at 09:56

## 2022-04-25 RX ADMIN — ENOXAPARIN SODIUM 40 MG: 40 INJECTION SUBCUTANEOUS at 21:08

## 2022-04-25 ASSESSMENT — ACTIVITIES OF DAILY LIVING (ADL)
ADLS_ACUITY_SCORE: 6
ADLS_ACUITY_SCORE: 6
ADLS_ACUITY_SCORE: 4
ADLS_ACUITY_SCORE: 6
DIFFICULTY_EATING/SWALLOWING: NO
ADLS_ACUITY_SCORE: 6
DIFFICULTY_COMMUNICATING: NO
ADLS_ACUITY_SCORE: 6
WALKING_OR_CLIMBING_STAIRS_DIFFICULTY: NO
DOING_ERRANDS_INDEPENDENTLY_DIFFICULTY: NO
ADLS_ACUITY_SCORE: 3
DRESSING/BATHING_DIFFICULTY: NO
TOILETING_ISSUES: NO
ADLS_ACUITY_SCORE: 4
WEAR_GLASSES_OR_BLIND: NO
CONCENTRATING,_REMEMBERING_OR_MAKING_DECISIONS_DIFFICULTY: NO
ADLS_ACUITY_SCORE: 6
ADLS_ACUITY_SCORE: 4
ADLS_ACUITY_SCORE: 3
FALL_HISTORY_WITHIN_LAST_SIX_MONTHS: NO
HEARING_DIFFICULTY_OR_DEAF: NO
ADLS_ACUITY_SCORE: 3
ADLS_ACUITY_SCORE: 3

## 2022-04-25 NOTE — PLAN OF CARE
PRIMARY DIAGNOSIS: Asthma exacerbation/bronchitis:  OUTPATIENT/OBSERVATION GOALS TO BE MET BEFORE DISCHARGE:  1. Vital signs stable: Yes    2. Improvement of peak flow to greater than 70% sustained off nebulizer for 4 hours: Yes    3. Dyspnea improved and O2 sats >88% at RA or at prior home O2 therapy level: Yes      SpO2: 92 %, O2 Device: None (Room air)    4. Short term supplemental O2 needed for use with activity at home: Yes    5. Tolerating adequate PO diet and medications: Yes    6. Return to near baseline physical activity: Yes    Discharge Planner Nurse   Safe discharge environment identified: Yes  Barriers to discharge: No       Entered by: Nav Snow 04/25/2022 12:08 AM     Please review provider order for any additional goals.   Nurse to notify provider when observation goals have been met and patient is ready for discharge.

## 2022-04-25 NOTE — PLAN OF CARE
"PRIMARY DIAGNOSIS: Asthma exacerbation/bronchitis:  OUTPATIENT/OBSERVATION GOALS TO BE MET BEFORE DISCHARGE:  1. Vital signs stable: Yes    2. Improvement of peak flow to greater than 70% sustained off nebulizer for 4 hours: Yes    3. Dyspnea improved and O2 sats >88% at RA or at prior home O2 therapy level: Yes      SpO2: 92 %, O2 Device: Nasal cannula    4. Short term supplemental O2 needed for use with activity at home: Yes    5. Tolerating adequate PO diet and medications: Yes    6. Return to near baseline physical activity: Yes    Discharge Planner Nurse   Safe discharge environment identified: Yes  Barriers to discharge: No       Entered by: Nav Snow 04/25/2022 6:28 AM    A & O x 3, 2l 02 NC, scheduled duo nebs, Breo-elipta PRN,   Wheezing bilateral lobes, will continue to provide supportive care.     /63 (BP Location: Left arm)   Pulse 79   Temp 97.9  F (36.6  C) (Oral)   Resp 18   Ht 1.956 m (6' 5\")   Wt 91.1 kg (200 lb 12.8 oz)   SpO2 92%   BMI 23.81 kg/m         Please review provider order for any additional goals.   Nurse to notify provider when observation goals have been met and patient is ready for discharge.                      "

## 2022-04-25 NOTE — PROGRESS NOTES
Care Management Discharge Note    Discharge Date: 04/26/2022     Discharge Disposition:  Home    Handoff Referral Completed: Yes    Additional Information:  Pt identified as a Service Bundle #3. No needs or assessment needed at this time. Please consult CM/SW  if discharge needs should arise.    MADELAINE Whitfield

## 2022-04-25 NOTE — PROGRESS NOTES
"Cares from 0700 - 1500 hrs  Primary Diagnosis: Asthma Exacerbation/ Bronchitis    /66 (BP Location: Right arm)   Pulse 86   Temp 98.8  F (37.1  C) (Oral)   Resp 18   Ht 1.956 m (6' 5\")   Wt 91.1 kg (200 lb 12.8 oz)   SpO2 95%   BMI 23.81 kg/m        Neuro: Pt alert and oriented X4  GI: WDL  : WDL. Voiding spontaneously  Activity: SBA  Diet: Regular; Low appetite  Pain: Denies  Plan: VSS. Afebrile. Pt requiring 5 L of oxygen by nasal cannula with saturation between 92 - 93%. Denies pain. Pt continues on duo nebs, prednisone and Azithromycin. Expiratory wheezes noted this AM. Pt has productive cough. Respiratory panel collected and sent down to lab.     "

## 2022-04-25 NOTE — PROGRESS NOTES
[facility]  Medicine Progress Note - Hospitalist Service       Date of Admission:  4/24/2022  Assessment & Plan     Alexandro Lee is a 73 yo male with moderate-to-severe persistent asthma, asymptomatic bronchiectasis, vocal cord dysfunction, postnasal drip, GERD, HTN, and NAFLD with cirrhosis who presented to [facility] ED on 4/24/2022 with progressive severe shortness of breath and was found to be hypoxic with presumed asthma exacerbation.      Acute hypoxemic respiratory failure in setting of acute asthma exacerbation   Bronchiectasis  Significant leukocytosis and left shift on presentation however CXR did not suggest acute bacterial pneumonia.  COVID-19/influenza/RSV negative.  Procalcitonin very mildly elevated at 0.12.  Required 7 L O2 on presentation to keep saturations > 90%.  Weaned to 4-5 L overnight.  Morning VBG relatively compensated and leukocytosis improving.  Underlying asthma known to be moderate-to-severe at baseline.  Followed by Dr. Jones at [facility].  PFTs 3/2017 revealed severe obstructive ventilatory defect, expiratory flow limited flow volume, and presence of air trapping.  Severe obstructive ventilatory defect seen again on PFTs 2019.  Lifelong non-smoker.  Retired  for equal [employer] without known environmental or occupational exposures.  Per chart review, asthma symptoms appeared to improve with speech therapy for vocal cord dysfunction.  - Exam reveals patient is moving air relatively well and without wheezing.  Lungs are, however, very coarse at bilateral bases.  Continue oral prednisone 40 mg daily  - No h/o prior Pseudomonas pulmonary infection but did have previous positive MSSA cultures from sinuses.  Continue empiric azithromycin pending sputum culture.   - Respiratory panel added on to rule out human metapneumovirus  - Continue PTA inhalers  - Nebulizers 4 times daily by RT  - Ambulate  - Pulmonary toilet  - Low threshold to proceed  with CT chest if symptoms worsening or slow to improve      Hypertension   PTA atenolol, felodipine, and hydralazine continued      Hyponatremia   Sodium 130 on presentation.  Suspect related to poor oral intake/dehydration.  BMP not obtained this morning.  Patient agreeable to be trying to stay well-hydrated with electrolyte containing fluids.    - If appetite or oral intake remain relatively low, will give IV fluids overnight.   - Repeat BMP in the morning      NAFLD with cirrhosis  Mild transaminitis noted on presentation.  Known non-alcoholic fatty liver disease with reported diagnosis of cirrhosis.  Appears compensated.   - Repeat liver studies in AM    Diet: Regular Diet Adult    DVT Prophylaxis: Enoxaparin (Lovenox) SQ  Code Status: Full Code      DISPO:  Hopefully back home with wife in 2-4 days    TOM Carter  Hospitalist Service  [facility]     Text Page (7AM - 5PM, M-F)  ______________________________________________________________________    Interval History   Patient continues to feel quite lousy.  He remains oxygen dependent with sats of 89% on arrival to the room but popped up into the mid 90s with cough.  Agreeable to trying to ambulate more frequently in the room and sit in chair for meals.  Reports a very frequent, productive cough.  Denies fever or chills.  Is moving air relatively well but feels short of breath and wheezy when mobilizing.  No nausea or vomiting.    Data reviewed today: I reviewed all medications, new labs and imaging results over the last 24 hours. I personally reviewed no images or EKG's today.    Physical Exam   Vital Signs: Temp: 97.9  F (36.6  C) Temp src: Oral BP: 117/66 Pulse: 81   Resp: 18 SpO2: 94 % O2 Device: Nasal cannula Oxygen Delivery: 5 LPM  Weight: 200 lbs 12.8 oz    GENERAL:  Pleasant, cooperative, alert. WDWN.  Appears to feel unwell.  Nontoxic.   HEENT: Normocephalic, atraumatic.  Extra occular mm intact.  Sclera clear. PERRL.   Mucous membranes moist.     PULMONOLOGY: Moving relatively good aware but lungs are very coarse at bilateral bases.  No audible wheeze on auscultation but does appear to have some upper airway stridor/wheezing with mobilization.  CARDIAC: Regular, no audible murmur.  ABDOMEN: Soft, nontender    MUSCULOSKELETAL:  Moving x 4 spontaneously with CMS intact x4.  Normal bulk and tone.  No LE edema.     NEURO: Alert and oriented x3.  CN II-XII grossly intact and symmetric.  Nonfocal       Data   Recent Labs   Lab 04/25/22  0549 04/24/22  1716   WBC 18.0* 29.2*   HGB 14.0 15.5   MCV 88 88    423   NA  --  130*   POTASSIUM  --  4.4   CHLORIDE  --  95   CO2  --  30   BUN  --  16   CR  --  0.37*   ANIONGAP  --  5   ISABELL  --  9.4   GLC  --  120*   ALBUMIN  --  2.9*   PROTTOTAL  --  8.9*   BILITOTAL  --  0.7   ALKPHOS  --  105   ALT  --  96*   AST  --  84*     No results found for this or any previous visit (from the past 24 hour(s)).  Medications     [START ON 4/26/2022] sodium chloride         atenolol  100 mg Oral BID     azithromycin  500 mg Oral Daily     cetirizine  10 mg Oral QPM     enoxaparin ANTICOAGULANT  40 mg Subcutaneous Q24H     felodipine ER  10 mg Oral Daily     fluticasone  1-2 spray Both Nostrils Daily     fluticasone-vilanterol  1 puff Inhalation Daily     hydrALAZINE  50 mg Oral BID     ipratropium - albuterol 0.5 mg/2.5 mg/3 mL  3 mL Nebulization 4x daily     montelukast  10 mg Oral At Bedtime     predniSONE  40 mg Oral Daily     senna-docusate  1 tablet Oral BID    Or     senna-docusate  2 tablet Oral BID

## 2022-04-25 NOTE — PHARMACY-ADMISSION MEDICATION HISTORY
Admission medication history interview status for this patient is complete. See [facility] admission navigator for allergy information, prior to admission medications and immunization status.     Medication history interview done, indicate source(s): Patient  Medication history resources (including written lists, pill bottles, clinic record):None  Pharmacy: [facility] mail order or [facility] in [place], [place] off [address]    Changes made to PTA medication list:  Added: Trelegy  Changed: none  Reported as Not Taking: Advair Diskus (changed to Trelegy, unable to shaan, has refills), azelastine (discontinued, unable to shaan, has refills), fluticasone (discontinued, unable to shaan, has refills)  Removed: Incruse Ellipta (changed to Trelegy), ipratropium nasal spray (discontinued)    Actions taken by pharmacist (provider contacted, etc):None     Additional medication history information:None    Medication reconciliation/reorder completed by provider prior to medication history?  Y    Prior to Admission medications    Medication Sig Last Dose Taking? Auth Provider   albuterol (PROAIR HFA/PROVENTIL HFA/VENTOLIN HFA) 108 (90 Base) MCG/ACT inhaler Inhale 1-2 puffs into the lungs every 4 hours as needed for shortness of breath / dyspnea or wheezing 4/24/2022 at PM Yes Garrett Sotelo MD   aspirin 81 MG tablet Take 81 mg by mouth daily 4/23/2022 at PM Yes Reported, Patient   atenolol (TENORMIN) 100 MG tablet Take 0.5 tablets (50 mg) by mouth 2 times daily 4/24/2022 at 1000 Yes Garrett Sotelo MD   cetirizine (ZYRTEC) 10 MG tablet Take 1 tablet (10 mg) by mouth every evening 4/23/2022 at PM Yes Garrett Sotelo MD   cholecalciferol (VITAMIN  -D) 1000 UNITS capsule Take 2 capsules by mouth daily  4/23/2022 at PM Yes Reported, Patient   Dextromethorphan-Guaifenesin  MG TB12  Unknown at Unknown time Yes Reported, Patient   DM-APAP-CPM (CORICIDIN HBP FLU PO)  4/24/2022 at AM Yes Reported, Patient   felodipine ER  (PLENDIL) 10 MG 24 hr tablet Take 1 tablet (10 mg) by mouth daily 4/23/2022 at Unknown time Yes Garrett Sotelo MD   Fluticasone-Umeclidin-Vilanterol (TRELEGY ELLIPTA) 100-62.5-25 MCG/INH oral inhaler Inhale 1 puff into the lungs daily 4/24/2022 at AM Yes Reported, Patient   hydrALAZINE (APRESOLINE) 50 MG tablet Take 1 tablet (50 mg) by mouth 2 times daily - fasting annual exam due August 2022 4/24/2022 at AM Yes Lj Gil, Bertha Galaviz, PA-C   ibuprofen (ADVIL/MOTRIN) 100 MG tablet Take 100 mg by mouth every 4 hours as needed Unknown at Unknown time Yes Reported, Patient   magnesium 250 MG tablet Take 1 tablet by mouth daily More than a month at Unknown time Yes Reported, Patient   montelukast (SINGULAIR) 10 MG tablet TAKE 1 TABLET AT BEDTIME 4/23/2022 at PM Yes Garrett Sotelo MD   Multiple Vitamins-Minerals (CENTRUM SILVER) per tablet Take 1 tablet by mouth daily 4/24/2022 at AM Yes Reported, Patient   OMEPRAZOLE PO Take 40 mg by mouth daily 4/24/2022 at AM Yes Reported, Patient   ADVAIR DISKUS 250-50 MCG/DOSE inhaler USE 1 INHALATION ORALLY    TWICE DAILY   Garrett Sotelo MD   azelastine (ASTELIN) 0.1 % nasal spray Spray 2 sprays into both nostrils 2 times daily   Garrett Sotelo MD   fluticasone (FLONASE) 50 MCG/ACT spray Spray 1-2 sprays into both nostrils daily   Garrett Sotelo MD

## 2022-04-25 NOTE — H&P
[facility]    History and Physical  Hospitalist       Date of Admission:  4/24/2022    Assessment & Plan     This is a 72 years old male with past medical history of uncontrolled asthma who presented with shortness of breath with asthma exacerbation admitted for further treatment and evaluation.    Asthma exacerbation/bronchitis:    At admission requiring 2 L of oxygen    Leukocytosis 29     Continue with steroid prednisone 40 mg with possible plan for tapering    Resume home inhalers on Advair and albuterol    Patient was started on antibiotics with azithromycin which is reasonable to continue    X-ray show questionable pulmonary vascular congestion no history of CHF BNP normal expected this is secondary to bronchitis/underlying pneumonia cannot be ruled out at this time we will hold on any IV fluid, continue with antibiotics as above    If no improvement will consider CT scan    Patient denies any history of COPD COVID-negative    We will get a VBG    Leukocytosis:    29,000 at admission expected secondary to possible bronchitis also steroid?  Although he did not get steroid until today recheck in a.m. if no improvement consider further imaging and broaden -spectrum antibiotics    Hypertension resume home medication on multiple atenolol felodipine and hydralazine    Hyponatremia 130 unclear etiology recheck in a.m. expected secondary to above with respiratory causes and possible SIADH/consider  further testing if no improvement.      DVT Prophylaxis: Low Risk/Ambulatory with no VTE prophylaxis indicated  Code Status: Full Code discussed with the patient and his wife at the bedside        Sravan Euceda MD    Primary Care Physician   Garrett Sotelo    Chief Complaint   Shortness of breath    History is obtained from the patient    History of Present Illness   This is a 72 years old male with past medical history of uncontrolled asthma who presented with shortness of breath with asthma  exacerbation admitted for further treatment and evaluation.  The patient states that he has had a cold for about the last 2 weeks but for the last three weeks he has been feeling more short of breath.  He states he has been trying to use his inhalers at home but felt so short of breath today that he called EMS.  The patient was given 125 mg of Solu-Medrol and 1 DuoNeb by EMS on route.  On arrival in the ED he still feels quite short of breath. He states his symptoms typically worsen with abrupt weather changes like the one that happened yesterday. He denies chest pain.   Despite multiple nebulizer he still feeling dyspneic with wheezing leukocytosis at admission 29,000 x-ray shows some congestion no infiltration admitted for observation for further treatment evaluation and monitor overnight.    Past Medical History    Past Medical History:   Diagnosis Date     Arthritis     osteoarthritis     Hyperlipidemia      Moderate persistent asthma 4/14/2017     Unspecified essential hypertension     BPs run lower at home than in the office.       Past Surgical History   Past Surgical History     Prior to Admission Medications   Prior to Admission Medications   Prescriptions Last Dose Informant Patient Reported? Taking?   ADVAIR DISKUS 250-50 MCG/DOSE inhaler   No No   Sig: USE 1 INHALATION ORALLY    TWICE DAILY   DM-APAP-CPM (CORICIDIN HBP FLU PO)   Yes No   Dextromethorphan-Guaifenesin  MG TB12   Yes No   INCRUSE ELLIPTA 62.5 MCG/INH Inhaler   Yes No   Sig: INL 1 PUFF PO QD   Multiple Vitamins-Minerals (CENTRUM SILVER) per tablet  Self Yes No   Sig: Take 1 tablet by mouth daily   OMEPRAZOLE PO  Self Yes No   Sig: Take 20 mg by mouth as needed    albuterol (PROAIR HFA/PROVENTIL HFA/VENTOLIN HFA) 108 (90 Base) MCG/ACT inhaler   No No   Sig: Inhale 1-2 puffs into the lungs every 4 hours as needed for shortness of breath / dyspnea or wheezing   aspirin 81 MG tablet   Yes No   Sig: Take 81 mg by mouth daily   atenolol  (TENORMIN) 100 MG tablet   No No   Sig: Take 0.5 tablets (50 mg) by mouth 2 times daily   azelastine (ASTELIN) 0.1 % nasal spray   No No   Sig: Spray 2 sprays into both nostrils 2 times daily   cetirizine (ZYRTEC) 10 MG tablet   No No   Sig: Take 1 tablet (10 mg) by mouth every evening   cholecalciferol (VITAMIN  -D) 1000 UNITS capsule   Yes No   Sig: Take 2 capsules by mouth daily    felodipine ER (PLENDIL) 10 MG 24 hr tablet   No No   Sig: Take 1 tablet (10 mg) by mouth daily   fluticasone (FLONASE) 50 MCG/ACT spray   No No   Sig: Spray 1-2 sprays into both nostrils daily   hydrALAZINE (APRESOLINE) 50 MG tablet   No No   Sig: Take 1 tablet (50 mg) by mouth 2 times daily - fasting annual exam due August 2022   ibuprofen (ADVIL/MOTRIN) 100 MG tablet   Yes No   Sig: Take 100 mg by mouth every 4 hours as needed   ipratropium (ATROVENT) 0.06 % nasal spray   Yes No   magnesium 250 MG tablet   Yes No   Sig: Take 1 tablet by mouth daily   montelukast (SINGULAIR) 10 MG tablet   No No   Sig: TAKE 1 TABLET AT BEDTIME   pseudoePHEDrine (SUDAFED) 30 MG tablet   Yes No   Sig: Take by mouth every 4 hours as needed for congestion      Facility-Administered Medications: None     Allergies   Allergies   Allergen Reactions     Amoxicillin Hives     hives     Lisinopril      Cough       Tetanus Toxoids      DPT as a child caused severe swelling of arm       Social History   Alexandro Lee  reports that he has never smoked. He has never used smokeless tobacco. He reports current alcohol use. He reports that he does not use drugs.    Family History   Alexandro Lee family history includes Diabetes in his father; Heart Disease in his father.    Review of Systems   The 10 point Review of Systems is negative other than noted in the HPI or here.     Physical Exam   Temp: 98  F (36.7  C) Temp src: Oral BP: (!) 146/85 Pulse: 104   Resp: (!) 31 SpO2: 94 % O2 Device: Nasal cannula Oxygen Delivery: 4 LPM  Vital Signs with Ranges  Temp:  [98  F  (36.7  C)] 98  F (36.7  C)  Pulse:  [102-112] 104  Resp:  [27-39] 31  BP: (146-188)/() 146/85  SpO2:  [93 %-98 %] 94 %  200 lbs 0 oz    Constitutional: Awake, alert, cooperative, no apparent distress.  Eyes: Conjunctiva and pupils examined and normal.  HEENT: Moist mucous membranes, normal dentition.  Respiratory: Mild respiratory distress using accessory muscles tachypneic satting 93% on 2 L of oxygen poor air entry minimal rhonchi  Cardiovascular: ,normal S1 and S2, and no murmur noted.  GI: Soft, non-distended, non-tender, normal bowel sounds.  Neurologic: AXOX3 no focal       Data   Data reviewed today:  I personally reviewed no images or EKG's today.  Recent Labs   Lab 04/24/22  1716   WBC 29.2*   HGB 15.5   MCV 88      *   POTASSIUM 4.4   CHLORIDE 95   CO2 30   BUN 16   CR 0.37*   ANIONGAP 5   ISABELL 9.4   *   ALBUMIN 2.9*   PROTTOTAL 8.9*   BILITOTAL 0.7   ALKPHOS 105   ALT 96*   AST 84*       Imaging:  Recent Results (from the past 24 hour(s))   XR Chest Port 1 View    Narrative    EXAM: XR CHEST PORT 1 VIEW  LOCATION: [facility]  DATE/TIME: 4/24/2022 5:36 PM    INDICATION: SOB  COMPARISON: 11/21/2016      Impression    IMPRESSION: Heart and mediastinal size are normal. There is some pulmonary vascular congestion. Streaky opacity involving the lung bases may reflect some overlying atelectasis or sequela of infection. No pleural effusion or pneumothorax is seen.

## 2022-04-25 NOTE — ED NOTES
[facility]  ED Nurse Handoff Report    Alexandro Lee is a 72 year old male   ED Chief complaint: Shortness of Breath  . ED Diagnosis:   Final diagnoses:   None     Allergies:   Allergies   Allergen Reactions     Amoxicillin Hives     hives     Lisinopril      Cough       Tetanus Toxoids      DPT as a child caused severe swelling of arm       Code Status: Full Code  Activity level - Baseline/Home:  Independent. Activity Level - Current:   Stand by Assist. Lift room needed: No. Bariatric: No   Needed: No   Isolation: No. Infection: Not Applicable.     Vital Signs:   Vitals:    04/24/22 1815 04/24/22 1830 04/24/22 1845 04/24/22 1900   BP: (!) 166/87 (!) 153/87 (!) 149/93 (!) 146/85   Pulse: 112 108 102 104   Resp: 27 (!) 35 (!) 37 (!) 31   Temp:       TempSrc:       SpO2: 93% 93% 94% 94%   Weight:           Cardiac Rhythm:  ,      Pain level:    Patient confused: No. Patient Falls Risk: No.   Elimination Status: Has voided   Patient Report - Initial Complaint: SOB. Focused Assessment: asthma exacerbation   Tests Performed:   XR Chest Port 1 View   Final Result   IMPRESSION: Heart and mediastinal size are normal. There is some pulmonary vascular congestion. Streaky opacity involving the lung bases may reflect some overlying atelectasis or sequela of infection. No pleural effusion or pneumothorax is seen.        . Abnormal Results:   Labs Ordered and Resulted from Time of ED Arrival to Time of ED Departure   COMPREHENSIVE METABOLIC PANEL - Abnormal       Result Value    Sodium 130 (*)     Potassium 4.4      Chloride 95      Carbon Dioxide (CO2) 30      Anion Gap 5      Urea Nitrogen 16      Creatinine 0.37 (*)     Calcium 9.4      Glucose 120 (*)     Alkaline Phosphatase 105      AST 84 (*)     ALT 96 (*)     Protein Total 8.9 (*)     Albumin 2.9 (*)     Bilirubin Total 0.7      GFR Estimate >90     CBC WITH PLATELETS AND DIFFERENTIAL - Abnormal    WBC Count 29.2 (*)     RBC Count 5.28       Hemoglobin 15.5      Hematocrit 46.4      MCV 88      MCH 29.4      MCHC 33.4      RDW 12.1      Platelet Count 423      % Neutrophils 89      % Lymphocytes 6      % Monocytes 4      % Eosinophils 0      % Basophils 0      % Immature Granulocytes 1      NRBCs per 100 WBC 0      Absolute Neutrophils 25.9 (*)     Absolute Lymphocytes 1.8      Absolute Monocytes 1.1      Absolute Eosinophils 0.0      Absolute Basophils 0.1      Absolute Immature Granulocytes 0.3      Absolute NRBCs 0.0     TROPONIN I - Normal    Troponin I High Sensitivity 6     NT PROBNP INPATIENT - Normal    N terminal Pro BNP Inpatient 215     INFLUENZA A/B & SARS-COV2 PCR MULTIPLEX - Normal    Influenza A PCR Negative      Influenza B PCR Negative      RSV PCR Negative      SARS CoV2 PCR Negative       .   Treatments provided: see MAR  Family Comments: wife at bedside  OBS brochure/video discussed/provided to patient:  Yes  ED Medications:   Medications   azithromycin 500 mg (ZITHROMAX) in 0.9% NaCl 250 mL intermittent infusion 500 mg (500 mg Intravenous New Bag 4/24/22 1741)   albuterol (PROVENTIL) neb solution 2.5 mg (2.5 mg Nebulization Given 4/24/22 1900)   magnesium sulfate 2 g in water intermittent infusion (2 g Intravenous New Bag 4/24/22 1900)   ipratropium - albuterol 0.5 mg/2.5 mg/3 mL (DUONEB) neb solution 3 mL (3 mLs Nebulization Given 4/24/22 1726)   0.9% sodium chloride BOLUS (500 mLs Intravenous New Bag 4/24/22 1712)     Drips infusing:  Yes  For the majority of the shift, the patient's behavior Green. Interventions performed were n/a.    Sepsis treatment initiated: No     Patient tested for COVID 19 prior to admission: YES    ED Nurse Name/Phone Number: Saundra Ivy RN,   7:04 PM    RECEIVING UNIT ED HANDOFF REVIEW    Above ED Nurse Handoff Report was reviewed: Yes  Reviewed by: Nav Snow RN on April 24, 2022 at 8:52 PM

## 2022-04-25 NOTE — PLAN OF CARE
ROOM # 226    Living Situation Home  Facility name:  : [facility]    Activity level at baseline: IND  Activity level on admit: IND    Who will be transporting you at discharge: [facility]    Patient registered to observation; given Patient Bill of Rights; given the opportunity to ask questions about observation status and their plan of care.  Patient has been oriented to the observation room, bathroom and call light is in place.    Discussed discharge goals and expectations with patient/family.       BP (!) 180/86 (BP Location: Left arm, Patient Position: Semi-Madrigal's, Cuff Size: Adult Regular)   Pulse 106   Temp 98.3  F (36.8  C) (Oral)   Resp 22   Wt 90.7 kg (200 lb)   SpO2 92%   BMI 23.72 kg/m

## 2022-04-26 ENCOUNTER — APPOINTMENT (OUTPATIENT)
Dept: GENERAL RADIOLOGY | Facility: CLINIC | Age: 72
DRG: 202 | End: 2022-04-26
Attending: PHYSICIAN ASSISTANT
Payer: MEDICARE

## 2022-04-26 LAB
ALBUMIN SERPL-MCNC: 2.5 G/DL (ref 3.4–5)
ALP SERPL-CCNC: 79 U/L (ref 40–150)
ALT SERPL W P-5'-P-CCNC: 110 U/L (ref 0–70)
ANION GAP SERPL CALCULATED.3IONS-SCNC: 3 MMOL/L (ref 3–14)
AST SERPL W P-5'-P-CCNC: 81 U/L (ref 0–45)
BACTERIA SPT CULT: NORMAL
BASE EXCESS BLDV CALC-SCNC: 1.9 MMOL/L (ref -7.7–1.9)
BASOPHILS # BLD AUTO: 0.1 10E3/UL (ref 0–0.2)
BASOPHILS NFR BLD AUTO: 0 %
BILIRUB SERPL-MCNC: 0.6 MG/DL (ref 0.2–1.3)
BUN SERPL-MCNC: 19 MG/DL (ref 7–30)
CALCIUM SERPL-MCNC: 8.6 MG/DL (ref 8.5–10.1)
CHLORIDE BLD-SCNC: 100 MMOL/L (ref 94–109)
CO2 SERPL-SCNC: 32 MMOL/L (ref 20–32)
CREAT SERPL-MCNC: 0.36 MG/DL (ref 0.66–1.25)
EOSINOPHIL # BLD AUTO: 0 10E3/UL (ref 0–0.7)
EOSINOPHIL NFR BLD AUTO: 0 %
ERYTHROCYTE [DISTWIDTH] IN BLOOD BY AUTOMATED COUNT: 12.5 % (ref 10–15)
GFR SERPL CREATININE-BSD FRML MDRD: >90 ML/MIN/1.73M2
GLUCOSE BLD-MCNC: 135 MG/DL (ref 70–99)
GRAM STAIN RESULT: NORMAL
HCO3 BLDV-SCNC: 30 MMOL/L (ref 21–28)
HCT VFR BLD AUTO: 41.9 % (ref 40–53)
HGB BLD-MCNC: 13.8 G/DL (ref 13.3–17.7)
IMM GRANULOCYTES # BLD: 0.1 10E3/UL
IMM GRANULOCYTES NFR BLD: 1 %
LYMPHOCYTES # BLD AUTO: 0.9 10E3/UL (ref 0.8–5.3)
LYMPHOCYTES NFR BLD AUTO: 5 %
MAGNESIUM SERPL-MCNC: 2.3 MG/DL (ref 1.6–2.3)
MCH RBC QN AUTO: 29.1 PG (ref 26.5–33)
MCHC RBC AUTO-ENTMCNC: 32.9 G/DL (ref 31.5–36.5)
MCV RBC AUTO: 88 FL (ref 78–100)
MONOCYTES # BLD AUTO: 0.9 10E3/UL (ref 0–1.3)
MONOCYTES NFR BLD AUTO: 5 %
NEUTROPHILS # BLD AUTO: 15.5 10E3/UL (ref 1.6–8.3)
NEUTROPHILS NFR BLD AUTO: 89 %
NRBC # BLD AUTO: 0 10E3/UL
NRBC BLD AUTO-RTO: 0 /100
O2/TOTAL GAS SETTING VFR VENT: 3 %
PCO2 BLDV: 63 MM HG (ref 40–50)
PH BLDV: 7.29 [PH] (ref 7.32–7.43)
PLATELET # BLD AUTO: 376 10E3/UL (ref 150–450)
PO2 BLDV: 54 MM HG (ref 25–47)
POTASSIUM BLD-SCNC: 3.7 MMOL/L (ref 3.4–5.3)
PROT SERPL-MCNC: 7.3 G/DL (ref 6.8–8.8)
RBC # BLD AUTO: 4.74 10E6/UL (ref 4.4–5.9)
SODIUM SERPL-SCNC: 135 MMOL/L (ref 133–144)
WBC # BLD AUTO: 17.6 10E3/UL (ref 4–11)

## 2022-04-26 PROCEDURE — 94640 AIRWAY INHALATION TREATMENT: CPT

## 2022-04-26 PROCEDURE — 94640 AIRWAY INHALATION TREATMENT: CPT | Mod: 76

## 2022-04-26 PROCEDURE — 250N000011 HC RX IP 250 OP 636: Performed by: PHYSICIAN ASSISTANT

## 2022-04-26 PROCEDURE — 83735 ASSAY OF MAGNESIUM: CPT | Performed by: PHYSICIAN ASSISTANT

## 2022-04-26 PROCEDURE — 250N000013 HC RX MED GY IP 250 OP 250 PS 637: Performed by: HOSPITALIST

## 2022-04-26 PROCEDURE — 87205 SMEAR GRAM STAIN: CPT | Performed by: PHYSICIAN ASSISTANT

## 2022-04-26 PROCEDURE — 999N000226 HC STATISTIC SLP IP EVAL DEFER: Performed by: SPEECH-LANGUAGE PATHOLOGIST

## 2022-04-26 PROCEDURE — 250N000013 HC RX MED GY IP 250 OP 250 PS 637: Performed by: PHYSICIAN ASSISTANT

## 2022-04-26 PROCEDURE — 250N000012 HC RX MED GY IP 250 OP 636 PS 637: Performed by: HOSPITALIST

## 2022-04-26 PROCEDURE — 71046 X-RAY EXAM CHEST 2 VIEWS: CPT

## 2022-04-26 PROCEDURE — 85025 COMPLETE CBC W/AUTO DIFF WBC: CPT | Performed by: PHYSICIAN ASSISTANT

## 2022-04-26 PROCEDURE — 999N000157 HC STATISTIC RCP TIME EA 10 MIN

## 2022-04-26 PROCEDURE — 99233 SBSQ HOSP IP/OBS HIGH 50: CPT | Performed by: PHYSICIAN ASSISTANT

## 2022-04-26 PROCEDURE — 258N000003 HC RX IP 258 OP 636: Performed by: PHYSICIAN ASSISTANT

## 2022-04-26 PROCEDURE — 120N000004 HC R&B MS OVERFLOW

## 2022-04-26 PROCEDURE — 36415 COLL VENOUS BLD VENIPUNCTURE: CPT | Performed by: PHYSICIAN ASSISTANT

## 2022-04-26 PROCEDURE — 80053 COMPREHEN METABOLIC PANEL: CPT | Performed by: PHYSICIAN ASSISTANT

## 2022-04-26 PROCEDURE — 250N000009 HC RX 250: Performed by: HOSPITALIST

## 2022-04-26 PROCEDURE — 82803 BLOOD GASES ANY COMBINATION: CPT | Performed by: PHYSICIAN ASSISTANT

## 2022-04-26 RX ORDER — ALBUTEROL SULFATE 0.83 MG/ML
2.5 SOLUTION RESPIRATORY (INHALATION)
Status: DISCONTINUED | OUTPATIENT
Start: 2022-04-26 | End: 2022-04-28 | Stop reason: HOSPADM

## 2022-04-26 RX ORDER — AMOXICILLIN 250 MG
1 CAPSULE ORAL 2 TIMES DAILY PRN
Status: DISCONTINUED | OUTPATIENT
Start: 2022-04-26 | End: 2022-04-28 | Stop reason: HOSPADM

## 2022-04-26 RX ORDER — PROCHLORPERAZINE 25 MG
12.5 SUPPOSITORY, RECTAL RECTAL EVERY 12 HOURS PRN
Status: DISCONTINUED | OUTPATIENT
Start: 2022-04-26 | End: 2022-04-28 | Stop reason: HOSPADM

## 2022-04-26 RX ORDER — FAMOTIDINE 20 MG/1
20 TABLET, FILM COATED ORAL 2 TIMES DAILY
Status: DISCONTINUED | OUTPATIENT
Start: 2022-04-26 | End: 2022-04-28 | Stop reason: HOSPADM

## 2022-04-26 RX ORDER — OXYCODONE HYDROCHLORIDE 5 MG/1
5 TABLET ORAL EVERY 4 HOURS PRN
Status: DISCONTINUED | OUTPATIENT
Start: 2022-04-26 | End: 2022-04-28 | Stop reason: HOSPADM

## 2022-04-26 RX ORDER — PROCHLORPERAZINE MALEATE 5 MG
5 TABLET ORAL EVERY 6 HOURS PRN
Status: DISCONTINUED | OUTPATIENT
Start: 2022-04-26 | End: 2022-04-28 | Stop reason: HOSPADM

## 2022-04-26 RX ORDER — ACETAMINOPHEN 650 MG/1
650 SUPPOSITORY RECTAL EVERY 6 HOURS PRN
Status: DISCONTINUED | OUTPATIENT
Start: 2022-04-26 | End: 2022-04-26

## 2022-04-26 RX ORDER — ONDANSETRON 2 MG/ML
4 INJECTION INTRAMUSCULAR; INTRAVENOUS EVERY 6 HOURS PRN
Status: DISCONTINUED | OUTPATIENT
Start: 2022-04-26 | End: 2022-04-26

## 2022-04-26 RX ORDER — NALOXONE HYDROCHLORIDE 0.4 MG/ML
0.4 INJECTION, SOLUTION INTRAMUSCULAR; INTRAVENOUS; SUBCUTANEOUS
Status: DISCONTINUED | OUTPATIENT
Start: 2022-04-26 | End: 2022-04-28 | Stop reason: HOSPADM

## 2022-04-26 RX ORDER — KETOROLAC TROMETHAMINE 15 MG/ML
15 INJECTION, SOLUTION INTRAMUSCULAR; INTRAVENOUS EVERY 6 HOURS PRN
Status: DISCONTINUED | OUTPATIENT
Start: 2022-04-26 | End: 2022-04-28 | Stop reason: HOSPADM

## 2022-04-26 RX ORDER — IBUPROFEN 600 MG/1
600 TABLET, FILM COATED ORAL EVERY 6 HOURS PRN
Status: DISCONTINUED | OUTPATIENT
Start: 2022-04-26 | End: 2022-04-28 | Stop reason: HOSPADM

## 2022-04-26 RX ORDER — IPRATROPIUM BROMIDE AND ALBUTEROL SULFATE 2.5; .5 MG/3ML; MG/3ML
3 SOLUTION RESPIRATORY (INHALATION)
Status: DISCONTINUED | OUTPATIENT
Start: 2022-04-26 | End: 2022-04-28 | Stop reason: HOSPADM

## 2022-04-26 RX ORDER — ONDANSETRON 4 MG/1
4 TABLET, ORALLY DISINTEGRATING ORAL EVERY 6 HOURS PRN
Status: DISCONTINUED | OUTPATIENT
Start: 2022-04-26 | End: 2022-04-26

## 2022-04-26 RX ORDER — BISACODYL 10 MG
10 SUPPOSITORY, RECTAL RECTAL DAILY PRN
Status: DISCONTINUED | OUTPATIENT
Start: 2022-04-26 | End: 2022-04-28 | Stop reason: HOSPADM

## 2022-04-26 RX ORDER — NALOXONE HYDROCHLORIDE 0.4 MG/ML
0.2 INJECTION, SOLUTION INTRAMUSCULAR; INTRAVENOUS; SUBCUTANEOUS
Status: DISCONTINUED | OUTPATIENT
Start: 2022-04-26 | End: 2022-04-28 | Stop reason: HOSPADM

## 2022-04-26 RX ORDER — ACETAMINOPHEN 325 MG/1
650 TABLET ORAL EVERY 6 HOURS PRN
Status: DISCONTINUED | OUTPATIENT
Start: 2022-04-26 | End: 2022-04-26

## 2022-04-26 RX ORDER — AMOXICILLIN 250 MG
2 CAPSULE ORAL 2 TIMES DAILY PRN
Status: DISCONTINUED | OUTPATIENT
Start: 2022-04-26 | End: 2022-04-28 | Stop reason: HOSPADM

## 2022-04-26 RX ADMIN — AZITHROMYCIN MONOHYDRATE 500 MG: 250 TABLET ORAL at 08:13

## 2022-04-26 RX ADMIN — PREDNISONE 40 MG: 20 TABLET ORAL at 08:13

## 2022-04-26 RX ADMIN — SODIUM CHLORIDE: 9 INJECTION, SOLUTION INTRAVENOUS at 00:27

## 2022-04-26 RX ADMIN — IPRATROPIUM BROMIDE AND ALBUTEROL SULFATE 3 ML: 2.5; .5 SOLUTION RESPIRATORY (INHALATION) at 07:51

## 2022-04-26 RX ADMIN — HYDRALAZINE HYDROCHLORIDE 50 MG: 50 TABLET, FILM COATED ORAL at 19:38

## 2022-04-26 RX ADMIN — HYDRALAZINE HYDROCHLORIDE 50 MG: 50 TABLET, FILM COATED ORAL at 08:14

## 2022-04-26 RX ADMIN — FAMOTIDINE 20 MG: 20 TABLET ORAL at 08:14

## 2022-04-26 RX ADMIN — IPRATROPIUM BROMIDE AND ALBUTEROL SULFATE 3 ML: 2.5; .5 SOLUTION RESPIRATORY (INHALATION) at 19:58

## 2022-04-26 RX ADMIN — ENOXAPARIN SODIUM 40 MG: 40 INJECTION SUBCUTANEOUS at 19:40

## 2022-04-26 RX ADMIN — ATENOLOL 50 MG: 50 TABLET ORAL at 19:39

## 2022-04-26 RX ADMIN — GUAIFENESIN AND DEXTROMETHORPHAN HYDROBROMIDE 2 TABLET: 600; 30 TABLET, EXTENDED RELEASE ORAL at 08:13

## 2022-04-26 RX ADMIN — ATENOLOL 100 MG: 50 TABLET ORAL at 08:15

## 2022-04-26 RX ADMIN — FLUTICASONE PROPIONATE 2 SPRAY: 50 SPRAY, METERED NASAL at 08:15

## 2022-04-26 RX ADMIN — SENNOSIDES AND DOCUSATE SODIUM 1 TABLET: 50; 8.6 TABLET ORAL at 19:39

## 2022-04-26 RX ADMIN — GUAIFENESIN AND DEXTROMETHORPHAN HYDROBROMIDE 2 TABLET: 600; 30 TABLET, EXTENDED RELEASE ORAL at 19:40

## 2022-04-26 RX ADMIN — FELODIPINE 10 MG: 5 TABLET, EXTENDED RELEASE ORAL at 08:13

## 2022-04-26 RX ADMIN — CETIRIZINE HYDROCHLORIDE 10 MG: 10 TABLET, FILM COATED ORAL at 19:38

## 2022-04-26 RX ADMIN — MONTELUKAST 10 MG: 10 TABLET, FILM COATED ORAL at 21:09

## 2022-04-26 RX ADMIN — IBUPROFEN 600 MG: 600 TABLET ORAL at 18:38

## 2022-04-26 RX ADMIN — FAMOTIDINE 20 MG: 20 TABLET ORAL at 19:38

## 2022-04-26 RX ADMIN — IPRATROPIUM BROMIDE AND ALBUTEROL SULFATE 3 ML: 2.5; .5 SOLUTION RESPIRATORY (INHALATION) at 16:43

## 2022-04-26 RX ADMIN — IPRATROPIUM BROMIDE AND ALBUTEROL SULFATE 3 ML: 2.5; .5 SOLUTION RESPIRATORY (INHALATION) at 12:42

## 2022-04-26 ASSESSMENT — ACTIVITIES OF DAILY LIVING (ADL)
ADLS_ACUITY_SCORE: 6

## 2022-04-26 NOTE — PLAN OF CARE
"Care from [id number]    Inpatient Progress Note:  For complete assessment see flow sheet documentation.    /68 (BP Location: Right arm, Patient Position: Semi-Madrigal's, Cuff Size: Adult Regular)   Pulse 71   Temp 98.2  F (36.8  C) (Oral)   Resp 18   Ht 1.956 m (6' 5\")   Wt 91.1 kg (200 lb 12.8 oz)   SpO2 96%   BMI 23.81 kg/m         Orientation: A & O x 3  Neuro: Alert & awake  Pain status: denies pain, Tylenol 650 mg & Advil  600 mg PRN  Activity: Bed rest, bathroom privileges  Peripheral edema:   Resp: coarse @ bilateral bases  Cardiac: regular  GI: normoactive bowel sounds, passing gas  :  voids using a urinal  Skin: clean, dry  LDA: peripheral  Infusions:  ml/ hr continuous  Pertinent Labs:   Diet: regular  Consults:   Discharge Plan: 2-4 days          /68 (BP Location: Right arm, Patient Position: Semi-Madrigal's, Cuff Size: Adult Regular)   Pulse 71   Temp 98.2  F (36.8  C) (Oral)   Resp 18   Ht 1.956 m (6' 5\")   Wt 91.1 kg (200 lb 12.8 oz)   SpO2 96%   BMI 23.81 kg/m                  "

## 2022-04-26 NOTE — PROGRESS NOTES
"Cares from 0700 - 1900 hrs    Primary Diagnosis: Asthma Exacerbation    BP (!) 147/77 (BP Location: Right arm)   Pulse 84   Temp 98.1  F (36.7  C) (Oral)   Resp 20   Ht 1.956 m (6' 5\")   Wt 91.1 kg (200 lb 12.8 oz)   SpO2 95%   BMI 23.81 kg/m      Neuro: Pt alert and oriented X 4   GI: WDL  : WDL; voiding spontaneosly  Activity: Independent in the room  Diet: Regular diet; Maintains a low appetite  Pain: Denies  Plan: VSS. Afebrile. Pt weaned down to 1.5 L of oxygen by nasal cannula with saturation between 94 -  95%. New sputum culture collected. Pt continues on Azithromycin, prednisone, duo nebs and PTA inhalers. Pt continues to have productive cough. PRN Mucinex given. Pt given Ibuprofen for sore throat. Pt was able to sit up in chair for a while. Pt declined ambulating outside room.       "

## 2022-04-26 NOTE — PLAN OF CARE
"Temp: 98.8  F (37.1  C) Temp src: Oral BP: 137/83 Pulse: 88   Resp: 20 SpO2: 96 % O2 Device: Nasal cannula Oxygen Delivery: 4 LPM  Weaned to 4L    A&Ox4. Up SBA, pt dizzy when up, cadena. Encouraging pt to get out of bed as he prefers to use the urinal. Encouraged pt to go for a walk in the hallway- pt refused and is \"too tired\". Very frequent productive cough, prn mucinex given x1. Complains of a sore throat due to the coughing, prn tylenol given x1. Little appetite for dinner maybe ate 25%. Plan- encourage oob, wean O2 as possible, duonebs 4x daily, respiratory panel negative, azithromycin daily.                     "

## 2022-04-26 NOTE — PLAN OF CARE
SLP: ST orders received, chart reviewed and discussed with the pt. Pt recalled previous ENT and SLP services for paradoxical vocal fold motion (PVFM) and postnasal drip. Pt independently recalled strategies and exercises provided by SLP. Pt reported he continues to occasionally get spasms and will need to use strategies (Inhale on a nasal sniff, slowly and completely exhale with  s  or  sh  sound). Pt reported that admission/current symptoms are not related to PVFM and he is not concerned with any changes related to vocal folds. Pt reported incentive spirometer has helped respiratory symptoms today.     Encouraged follow up with Outpatient SLP to review PVFM strategies. Pt reported that he has the book and the kazoo at home and does not need to follow up with SLP at this time.     Encouraged repeat ENT evaluation if any changes or concerns related to vocal folds.     Further reviewed dysphagia symptoms and difficulty coordinating breath/swallow during exacerbations. Pt denied any dysphagia symptoms at this time.     Pt in agreement with no indication for Inpatient SLP services. Will complete orders.

## 2022-04-26 NOTE — UTILIZATION REVIEW
Admission Status; Secondary Review Determination       Under the authority of the Utilization Management Committee, the utilization review process indicated a secondary review on the above patient. The review outcome is based on review of the medical records, discussions with staff, and applying clinical experience noted on the date of the review.     (x) Inpatient Status Appropriate - This patient's medical care is consistent with medical management for inpatient care and reasonable inpatient medical practice.     RATIONALE FOR DETERMINATION   71 yo male with moderate-to-severe persistent asthma, asymptomatic bronchiectasis, vocal cord dysfunction, postnasal drip, GERD, HTN, and NAFLD with cirrhosis who presented to [facility] ED on 4/24/2022 with progressive severe shortness of breath and was found to be hypoxic with presumed asthma exacerbation.  Patient requires inpatient admission versus short stay observation or outpatient treatment for the following reasons: Ongoing hypoxia and need for oxygen supplementation beyond 2 midnights, he was on 4 to 5 L on hospital day 2 and continues to be on 3 L/min on hospital day 3      The expected length of stay at the time of admission was more than 2 nights because of the severity of illness, intensity of service provided, and risk for adverse outcome. Inpatient admission is appropriate.         This document was produced using voice recognition software       The information on this document is developed by the utilization review team in order for the business office to ensure compliance. This only denotes the appropriateness of proper admission status and does not reflect the quality of care rendered.   The definitions of Inpatient Status and Observation Status used in making the determination above are those provided in the CMS Coverage Manual, Chapter 1 and Chapter 6, section 70.4.   Sincerely,   MANOJ COSTA MD   System Medical Director   Utilization Management    [facility].

## 2022-04-27 LAB
BASE EXCESS BLDV CALC-SCNC: 10.5 MMOL/L (ref -7.7–1.9)
BASOPHILS # BLD AUTO: 0.1 10E3/UL (ref 0–0.2)
BASOPHILS NFR BLD AUTO: 0 %
EOSINOPHIL # BLD AUTO: 0 10E3/UL (ref 0–0.7)
EOSINOPHIL NFR BLD AUTO: 0 %
ERYTHROCYTE [DISTWIDTH] IN BLOOD BY AUTOMATED COUNT: 12.4 % (ref 10–15)
HCO3 BLDV-SCNC: 35 MMOL/L (ref 21–28)
HCT VFR BLD AUTO: 43.3 % (ref 40–53)
HGB BLD-MCNC: 14.3 G/DL (ref 13.3–17.7)
IMM GRANULOCYTES # BLD: 0.2 10E3/UL
IMM GRANULOCYTES NFR BLD: 2 %
LYMPHOCYTES # BLD AUTO: 1.2 10E3/UL (ref 0.8–5.3)
LYMPHOCYTES NFR BLD AUTO: 11 %
MCH RBC QN AUTO: 28.8 PG (ref 26.5–33)
MCHC RBC AUTO-ENTMCNC: 33 G/DL (ref 31.5–36.5)
MCV RBC AUTO: 87 FL (ref 78–100)
MONOCYTES # BLD AUTO: 0.6 10E3/UL (ref 0–1.3)
MONOCYTES NFR BLD AUTO: 6 %
NEUTROPHILS # BLD AUTO: 9 10E3/UL (ref 1.6–8.3)
NEUTROPHILS NFR BLD AUTO: 81 %
NRBC # BLD AUTO: 0 10E3/UL
NRBC BLD AUTO-RTO: 0 /100
O2/TOTAL GAS SETTING VFR VENT: 2 %
PCO2 BLDV: 46 MM HG (ref 40–50)
PH BLDV: 7.49 [PH] (ref 7.32–7.43)
PLATELET # BLD AUTO: 349 10E3/UL (ref 150–450)
PO2 BLDV: 71 MM HG (ref 25–47)
RBC # BLD AUTO: 4.96 10E6/UL (ref 4.4–5.9)
WBC # BLD AUTO: 11.1 10E3/UL (ref 4–11)

## 2022-04-27 PROCEDURE — 250N000013 HC RX MED GY IP 250 OP 250 PS 637: Performed by: HOSPITALIST

## 2022-04-27 PROCEDURE — 250N000009 HC RX 250: Performed by: HOSPITALIST

## 2022-04-27 PROCEDURE — 85025 COMPLETE CBC W/AUTO DIFF WBC: CPT | Performed by: PHYSICIAN ASSISTANT

## 2022-04-27 PROCEDURE — 82803 BLOOD GASES ANY COMBINATION: CPT | Performed by: PHYSICIAN ASSISTANT

## 2022-04-27 PROCEDURE — 250N000013 HC RX MED GY IP 250 OP 250 PS 637: Performed by: PHYSICIAN ASSISTANT

## 2022-04-27 PROCEDURE — 250N000011 HC RX IP 250 OP 636: Performed by: PHYSICIAN ASSISTANT

## 2022-04-27 PROCEDURE — 36415 COLL VENOUS BLD VENIPUNCTURE: CPT | Performed by: PHYSICIAN ASSISTANT

## 2022-04-27 PROCEDURE — 250N000012 HC RX MED GY IP 250 OP 636 PS 637: Performed by: HOSPITALIST

## 2022-04-27 PROCEDURE — 94640 AIRWAY INHALATION TREATMENT: CPT | Mod: 76

## 2022-04-27 PROCEDURE — 99233 SBSQ HOSP IP/OBS HIGH 50: CPT | Performed by: PHYSICIAN ASSISTANT

## 2022-04-27 PROCEDURE — 999N000157 HC STATISTIC RCP TIME EA 10 MIN

## 2022-04-27 PROCEDURE — 120N000004 HC R&B MS OVERFLOW

## 2022-04-27 PROCEDURE — 94640 AIRWAY INHALATION TREATMENT: CPT

## 2022-04-27 RX ORDER — BENZONATATE 100 MG/1
100 CAPSULE ORAL 3 TIMES DAILY PRN
Status: DISCONTINUED | OUTPATIENT
Start: 2022-04-27 | End: 2022-04-28 | Stop reason: HOSPADM

## 2022-04-27 RX ADMIN — AZITHROMYCIN MONOHYDRATE 500 MG: 250 TABLET ORAL at 08:21

## 2022-04-27 RX ADMIN — FELODIPINE 10 MG: 5 TABLET, EXTENDED RELEASE ORAL at 08:20

## 2022-04-27 RX ADMIN — IPRATROPIUM BROMIDE AND ALBUTEROL SULFATE 3 ML: 2.5; .5 SOLUTION RESPIRATORY (INHALATION) at 15:12

## 2022-04-27 RX ADMIN — ATENOLOL 50 MG: 50 TABLET ORAL at 19:54

## 2022-04-27 RX ADMIN — ENOXAPARIN SODIUM 40 MG: 40 INJECTION SUBCUTANEOUS at 19:53

## 2022-04-27 RX ADMIN — FAMOTIDINE 20 MG: 20 TABLET ORAL at 08:21

## 2022-04-27 RX ADMIN — PREDNISONE 40 MG: 20 TABLET ORAL at 08:21

## 2022-04-27 RX ADMIN — FAMOTIDINE 20 MG: 20 TABLET ORAL at 19:53

## 2022-04-27 RX ADMIN — ACETAMINOPHEN 650 MG: 325 TABLET, FILM COATED ORAL at 21:14

## 2022-04-27 RX ADMIN — MONTELUKAST 10 MG: 10 TABLET, FILM COATED ORAL at 21:14

## 2022-04-27 RX ADMIN — IPRATROPIUM BROMIDE AND ALBUTEROL SULFATE 3 ML: 2.5; .5 SOLUTION RESPIRATORY (INHALATION) at 11:08

## 2022-04-27 RX ADMIN — FLUTICASONE PROPIONATE 2 SPRAY: 50 SPRAY, METERED NASAL at 08:20

## 2022-04-27 RX ADMIN — HYDRALAZINE HYDROCHLORIDE 50 MG: 50 TABLET, FILM COATED ORAL at 08:20

## 2022-04-27 RX ADMIN — CETIRIZINE HYDROCHLORIDE 10 MG: 10 TABLET, FILM COATED ORAL at 19:53

## 2022-04-27 RX ADMIN — IPRATROPIUM BROMIDE AND ALBUTEROL SULFATE 3 ML: 2.5; .5 SOLUTION RESPIRATORY (INHALATION) at 19:59

## 2022-04-27 RX ADMIN — BENZONATATE 100 MG: 100 CAPSULE ORAL at 11:20

## 2022-04-27 RX ADMIN — HYDRALAZINE HYDROCHLORIDE 50 MG: 50 TABLET, FILM COATED ORAL at 19:53

## 2022-04-27 RX ADMIN — ATENOLOL 100 MG: 50 TABLET ORAL at 08:20

## 2022-04-27 ASSESSMENT — ACTIVITIES OF DAILY LIVING (ADL)
ADLS_ACUITY_SCORE: 5
ADLS_ACUITY_SCORE: 6
ADLS_ACUITY_SCORE: 6
ADLS_ACUITY_SCORE: 5
ADLS_ACUITY_SCORE: 5
ADLS_ACUITY_SCORE: 6
ADLS_ACUITY_SCORE: 5
ADLS_ACUITY_SCORE: 6
ADLS_ACUITY_SCORE: 6
ADLS_ACUITY_SCORE: 5
ADLS_ACUITY_SCORE: 6
ADLS_ACUITY_SCORE: 5
ADLS_ACUITY_SCORE: 6
ADLS_ACUITY_SCORE: 6
ADLS_ACUITY_SCORE: 5
ADLS_ACUITY_SCORE: 6
ADLS_ACUITY_SCORE: 6
ADLS_ACUITY_SCORE: 5
ADLS_ACUITY_SCORE: 6
ADLS_ACUITY_SCORE: 5
ADLS_ACUITY_SCORE: 5
ADLS_ACUITY_SCORE: 6

## 2022-04-27 NOTE — PROGRESS NOTES
[facility]    Medicine Progress Note - Hospitalist Service    Date of Admission:  4/24/2022    Assessment & Plan        Alexandro Lee is a 73 yo male with PMH significant for  moderate to severe persistent asthma, asymptomatic bronchiectasis, vocal cord dysfunction, postnasal drip, GERD, HTN, and NAFLD with cirrhosis who presented to [facility] ED on 4/24/2022 with progressive severe shortness of breath and was found to be hypoxic with presumed asthma exacerbation.    #Acute hypoxemic respiratory failure in setting of acute asthma exacerbation  #Bronchiectasis  Significant leukocytosis and left shift on presentation however CXR did not suggest acute bacterial pneumonia. COVID-19/influenza/RSV negative. Procalcitonin very mildly elevated at 0.12.  Required 7 L O2 on presentation to keep saturations > 90%.  Underlying asthma known to be moderate-to-severe at baseline.  Followed by Dr. Jones at [place]. Severe obstructive ventilatory defect seen again on PFTs 2019. Lifelong non-smoker. Retired  for [employer] without known environmental or occupational exposures.  - VBG stable, no need to keep checking  - leukocytosis improving, 17.6 >> 11.1  - no wheezing on exam, ongoing productive cough, bibasilar coarse breath sounds  - continue oral prednisone 40 mg daily (day 3)  - no h/o prior Pseudomonas pulmonary infection but did have previous positive MSSA cultures from sinuses. Continue empiric azithromycin.  - initial sputum culture contaminated, repeat on 4/26 - culture currently in process   - PRN supplemental oxygen, wean as tolerated, down to 1 L  - Mucinex and Tessalon pearls PRN for cough  - respiratory panel negative  - Continue PTA inhalers  - Nebulizers 4 times daily by RT  - Ambulate, discussed with RN checking ambulatory oxygen saturations  - Pulmonary toilet  - due to ongoing improvement, no current need for further imaging with CT of chest   - speech  consulted, seen on 4/26 due to asthma symptoms appeared to improve with speech therapy for vocal cord dysfunction (encouraged outpatient speech f/u for PVFM strategies as needed, otherwise no acute concerns needing speech assistance this hospital stay)     #Hypertension: stable  - continue PTA atenolol, felodipine, and hydralazine continued     #Hyponatremia   Sodium 130 on presentation. Likely related to poor PO intake and dehydration.  - encourage fluid intake  - sodium improved to 135 on 4/26     #NAFLD with cirrhosis  Mild transaminitis noted on presentation.  Known non-alcoholic fatty liver disease with reported diagnosis of cirrhosis. Appears compensated  - LFTs stable      Diet: Regular Diet Adult    DVT Prophylaxis: Enoxaparin (Lovenox) SQ  Klein Catheter: Not present  Central Lines: None  Cardiac Monitoring: None  Code Status: Full Code      Disposition Plan   Expected Discharge: 04/28/2022     Anticipated discharge location:  Awaiting care coordination huddle  Delays:          The patient's care was discussed with the Attending Physician, Dr. Lozada, Bedside Nurse and Patient.    Julia Gutierrez PA-C  Hospitalist Service  [facility]  Securely message with the Vocera Web Console (learn more here)  Text page via [facility] Paging/Directory         Clinically Significant Risk Factors Present on Admission                 ______________________________________________________________________    Interval History   Patient reports little sleep overnight and ongoing coughing, asking for different cough suppressant. Denies chest pain, nausea, vomiting, or headache. No current wheezing. He is planning to go for a walk after breakfast. He has more shortness of breath with exertion still. Discussed that if he is weaned off oxygen he could go later today or possibly tomorrow.     Data reviewed today: I reviewed all medications, new labs and imaging results over the last 24 hours. I personally  reviewed no images or EKG's today.    Physical Exam   Vital Signs: Temp: 98  F (36.7  C) Temp src: Oral BP: 122/68 Pulse: 87   Resp: 18 SpO2: 94 % O2 Device: Nasal cannula Oxygen Delivery: 1 LPM  Weight: 198 lbs 1.6 oz  GENERAL:  Pleasant, cooperative, alert. WDWN.  Appears to feel unwell.  Nontoxic.   HEENT: Normocephalic, atraumatic.  Extra occular mm intact.  Sclera clear. PERRL.  Mucous membranes moist.     PULMONOLOGY: Moving air well, intermittent coarse breath sounds in bases. Very loose productive cough. No wheezing noted at rest.   CARDIAC: RRR, no audible murmur.  ABDOMEN: Soft, nontender    MUSCULOSKELETAL:  Moving x 4 spontaneously with CMS intact x4.  Normal bulk and tone.  No LE edema.     NEURO: Alert and oriented x3.  CN II-XII grossly intact and symmetric.  No focal deficits.    Data   Results for orders placed or performed during the hospital encounter of 04/24/22   XR Chest Port 1 View     Status: None    Narrative    EXAM: XR CHEST PORT 1 VIEW  LOCATION: [facility]  DATE/TIME: 4/24/2022 5:36 PM    INDICATION: SOB  COMPARISON: 11/21/2016      Impression    IMPRESSION: Heart and mediastinal size are normal. There is some pulmonary vascular congestion. Streaky opacity involving the lung bases may reflect some overlying atelectasis or sequela of infection. No pleural effusion or pneumothorax is seen.   XR Chest 2 Views     Status: None    Narrative    EXAM: XR CHEST 2 VW  LOCATION: [facility]  DATE/TIME: 4/26/2022 6:41 AM    INDICATION: acute asthma exacerbation, leukocytosis, follow up  COMPARISON: 04/24/2022      Impression    IMPRESSION: Stable cardiomediastinal silhouette. Persistent bibasilar infiltrates. Symmetric nodular density mid chest bilaterally questionably prominent nipple shadow. Additional nodule densities along the margin of the right chest. Uncertain if there   is an external artifact in this region. For example a nodule not excluded to  anterior ribs 5 and 6 on the right, 1.3 cm in size. Follow-up to confirm resolution of the pulmonary opacities recommended. Consider baseline chest CT follow-up. No pleural   effusion.   Comprehensive metabolic panel     Status: Abnormal   Result Value Ref Range    Sodium 130 (L) 133 - 144 mmol/L    Potassium 4.4 3.4 - 5.3 mmol/L    Chloride 95 94 - 109 mmol/L    Carbon Dioxide (CO2) 30 20 - 32 mmol/L    Anion Gap 5 3 - 14 mmol/L    Urea Nitrogen 16 7 - 30 mg/dL    Creatinine 0.37 (L) 0.66 - 1.25 mg/dL    Calcium 9.4 8.5 - 10.1 mg/dL    Glucose 120 (H) 70 - 99 mg/dL    Alkaline Phosphatase 105 40 - 150 U/L    AST 84 (H) 0 - 45 U/L    ALT 96 (H) 0 - 70 U/L    Protein Total 8.9 (H) 6.8 - 8.8 g/dL    Albumin 2.9 (L) 3.4 - 5.0 g/dL    Bilirubin Total 0.7 0.2 - 1.3 mg/dL    GFR Estimate >90 >60 mL/min/1.73m2   Troponin I     Status: Normal   Result Value Ref Range    Troponin I High Sensitivity 6 <79 ng/L   Nt probnp inpatient (BNP)     Status: Normal   Result Value Ref Range    N terminal Pro BNP Inpatient 215 0 - 900 pg/mL   Symptomatic; Unknown Influenza A/B & SARS-CoV2 (COVID-19) Virus PCR Multiplex Nasopharyngeal     Status: Normal    Specimen: Nasopharyngeal; Swab   Result Value Ref Range    Influenza A PCR Negative Negative    Influenza B PCR Negative Negative    RSV PCR Negative Negative    SARS CoV2 PCR Negative Negative    Narrative    Testing was performed using the Xpert Xpress CoV2/Flu/RSV Assay on the Cepheid GeneXpert Instrument. This test should be ordered for the detection of SARS-CoV-2 and influenza viruses in individuals who meet clinical and/or epidemiological criteria. Test performance is unknown in asymptomatic patients. This test is for in vitro diagnostic use under the FDA EUA for laboratories certified under CLIA to perform high or moderate complexity testing. This test has not been FDA cleared or approved. A negative result does not rule out the presence of PCR inhibitors in the specimen or  target RNA in concentration below the limit of detection for the assay. If only one viral target is positive but coinfection with multiple targets is suspected, the sample should be re-tested with another FDA cleared, approved, or authorized test, if coinfection would change clinical management. This test was validated by the [facility] [employer]. These laboratories are certified under the Clinical  Laboratory Improvement Amendments of 1988 (CLIA-88) as qualified to perform high complexity laboratory testing.   CBC with platelets and differential     Status: Abnormal   Result Value Ref Range    WBC Count 29.2 (H) 4.0 - 11.0 10e3/uL    RBC Count 5.28 4.40 - 5.90 10e6/uL    Hemoglobin 15.5 13.3 - 17.7 g/dL    Hematocrit 46.4 40.0 - 53.0 %    MCV 88 78 - 100 fL    MCH 29.4 26.5 - 33.0 pg    MCHC 33.4 31.5 - 36.5 g/dL    RDW 12.1 10.0 - 15.0 %    Platelet Count 423 150 - 450 10e3/uL    % Neutrophils 89 %    % Lymphocytes 6 %    % Monocytes 4 %    % Eosinophils 0 %    % Basophils 0 %    % Immature Granulocytes 1 %    NRBCs per 100 WBC 0 <1 /100    Absolute Neutrophils 25.9 (H) 1.6 - 8.3 10e3/uL    Absolute Lymphocytes 1.8 0.8 - 5.3 10e3/uL    Absolute Monocytes 1.1 0.0 - 1.3 10e3/uL    Absolute Eosinophils 0.0 0.0 - 0.7 10e3/uL    Absolute Basophils 0.1 0.0 - 0.2 10e3/uL    Absolute Immature Granulocytes 0.3 <=0.4 10e3/uL    Absolute NRBCs 0.0 10e3/uL   Extra Tube ([place] Draw)     Status: None    Narrative    The following orders were created for panel order Extra Tube ([place] Draw).  Procedure                               Abnormality         Status                     ---------                               -----------         ------                     Extra Heparinized Syringe[[id number]]                        Final result               Extra Green Top (Lithium...[[id number]]                      Final result                 Please view results for these tests on the individual orders.   Extra Heparinized  Syringe     Status: None   Result Value Ref Range    Hold Specimen [facility]    Extra Green Top (Lithium Heparin) ON ICE     Status: None   Result Value Ref Range    Hold Specimen [facility]    Blood gas venous and oxyhgb     Status: Abnormal   Result Value Ref Range    pH Venous 7.45 (H) 7.32 - 7.43    pCO2 Venous 50 40 - 50 mm Hg    pO2 Venous 60 (H) 25 - 47 mm Hg    Bicarbonate Venous 35 (H) 21 - 28 mmol/L    FIO2 4     Oxyhemoglobin Venous 91 (H) 70 - 75 %    Base Excess/Deficit (+/-) 8.8 (H) -7.7 - 1.9 mmol/L   CBC with platelets and differential     Status: Abnormal   Result Value Ref Range    WBC Count 18.0 (H) 4.0 - 11.0 10e3/uL    RBC Count 4.66 4.40 - 5.90 10e6/uL    Hemoglobin 14.0 13.3 - 17.7 g/dL    Hematocrit 41.2 40.0 - 53.0 %    MCV 88 78 - 100 fL    MCH 30.0 26.5 - 33.0 pg    MCHC 34.0 31.5 - 36.5 g/dL    RDW 12.3 10.0 - 15.0 %    Platelet Count 309 150 - 450 10e3/uL    % Neutrophils 93 %    % Lymphocytes 3 %    % Monocytes 2 %    % Eosinophils 0 %    % Basophils 0 %    % Immature Granulocytes 2 %    NRBCs per 100 WBC 0 <1 /100    Absolute Neutrophils 16.7 (H) 1.6 - 8.3 10e3/uL    Absolute Lymphocytes 0.6 (L) 0.8 - 5.3 10e3/uL    Absolute Monocytes 0.4 0.0 - 1.3 10e3/uL    Absolute Eosinophils 0.0 0.0 - 0.7 10e3/uL    Absolute Basophils 0.0 0.0 - 0.2 10e3/uL    Absolute Immature Granulocytes 0.3 <=0.4 10e3/uL    Absolute NRBCs 0.0 10e3/uL   Procalcitonin     Status: Abnormal   Result Value Ref Range    Procalcitonin 0.12 (H) <0.05 ng/mL   Extra Tube     Status: None    Narrative    The following orders were created for panel order Extra Tube.  Procedure                               Abnormality         Status                     ---------                               -----------         ------                     Extra Green Top (Lithium...[[id number]]                      Final result                 Please view results for these tests on the individual orders.   Extra Green Top (Lithium Heparin) Tube      Status: None   Result Value Ref Range    Hold Specimen [facility]    Comprehensive metabolic panel     Status: Abnormal   Result Value Ref Range    Sodium 135 133 - 144 mmol/L    Potassium 3.7 3.4 - 5.3 mmol/L    Chloride 100 94 - 109 mmol/L    Carbon Dioxide (CO2) 32 20 - 32 mmol/L    Anion Gap 3 3 - 14 mmol/L    Urea Nitrogen 19 7 - 30 mg/dL    Creatinine 0.36 (L) 0.66 - 1.25 mg/dL    Calcium 8.6 8.5 - 10.1 mg/dL    Glucose 135 (H) 70 - 99 mg/dL    Alkaline Phosphatase 79 40 - 150 U/L    AST 81 (H) 0 - 45 U/L     (H) 0 - 70 U/L    Protein Total 7.3 6.8 - 8.8 g/dL    Albumin 2.5 (L) 3.4 - 5.0 g/dL    Bilirubin Total 0.6 0.2 - 1.3 mg/dL    GFR Estimate >90 >60 mL/min/1.73m2   Blood gas venous     Status: Abnormal   Result Value Ref Range    pH Venous 7.29 (L) 7.32 - 7.43    pCO2 Venous 63 (H) 40 - 50 mm Hg    pO2 Venous 54 (H) 25 - 47 mm Hg    Bicarbonate Venous 30 (H) 21 - 28 mmol/L    Base Excess/Deficit (+/-) 1.9 -7.7 - 1.9 mmol/L    FIO2 3    Magnesium     Status: Normal   Result Value Ref Range    Magnesium 2.3 1.6 - 2.3 mg/dL   CBC with platelets and differential     Status: Abnormal   Result Value Ref Range    WBC Count 17.6 (H) 4.0 - 11.0 10e3/uL    RBC Count 4.74 4.40 - 5.90 10e6/uL    Hemoglobin 13.8 13.3 - 17.7 g/dL    Hematocrit 41.9 40.0 - 53.0 %    MCV 88 78 - 100 fL    MCH 29.1 26.5 - 33.0 pg    MCHC 32.9 31.5 - 36.5 g/dL    RDW 12.5 10.0 - 15.0 %    Platelet Count 376 150 - 450 10e3/uL    % Neutrophils 89 %    % Lymphocytes 5 %    % Monocytes 5 %    % Eosinophils 0 %    % Basophils 0 %    % Immature Granulocytes 1 %    NRBCs per 100 WBC 0 <1 /100    Absolute Neutrophils 15.5 (H) 1.6 - 8.3 10e3/uL    Absolute Lymphocytes 0.9 0.8 - 5.3 10e3/uL    Absolute Monocytes 0.9 0.0 - 1.3 10e3/uL    Absolute Eosinophils 0.0 0.0 - 0.7 10e3/uL    Absolute Basophils 0.1 0.0 - 0.2 10e3/uL    Absolute Immature Granulocytes 0.1 <=0.4 10e3/uL    Absolute NRBCs 0.0 10e3/uL   Blood gas venous     Status: Abnormal    Result Value Ref Range    pH Venous 7.49 (H) 7.32 - 7.43    pCO2 Venous 46 40 - 50 mm Hg    pO2 Venous 71 (H) 25 - 47 mm Hg    Bicarbonate Venous 35 (H) 21 - 28 mmol/L    Base Excess/Deficit (+/-) 10.5 (H) -7.7 - 1.9 mmol/L    FIO2 2    CBC with platelets and differential     Status: Abnormal   Result Value Ref Range    WBC Count 11.1 (H) 4.0 - 11.0 10e3/uL    RBC Count 4.96 4.40 - 5.90 10e6/uL    Hemoglobin 14.3 13.3 - 17.7 g/dL    Hematocrit 43.3 40.0 - 53.0 %    MCV 87 78 - 100 fL    MCH 28.8 26.5 - 33.0 pg    MCHC 33.0 31.5 - 36.5 g/dL    RDW 12.4 10.0 - 15.0 %    Platelet Count 349 150 - 450 10e3/uL    % Neutrophils 81 %    % Lymphocytes 11 %    % Monocytes 6 %    % Eosinophils 0 %    % Basophils 0 %    % Immature Granulocytes 2 %    NRBCs per 100 WBC 0 <1 /100    Absolute Neutrophils 9.0 (H) 1.6 - 8.3 10e3/uL    Absolute Lymphocytes 1.2 0.8 - 5.3 10e3/uL    Absolute Monocytes 0.6 0.0 - 1.3 10e3/uL    Absolute Eosinophils 0.0 0.0 - 0.7 10e3/uL    Absolute Basophils 0.1 0.0 - 0.2 10e3/uL    Absolute Immature Granulocytes 0.2 <=0.4 10e3/uL    Absolute NRBCs 0.0 10e3/uL   EKG 12-lead, tracing only     Status: None   Result Value Ref Range    Systolic Blood Pressure  mmHg    Diastolic Blood Pressure  mmHg    Ventricular Rate 114 BPM    Atrial Rate 114 BPM    NC Interval 174 ms    QRS Duration 76 ms     ms    QTc 446 ms    P Axis 64 degrees    R AXIS 87 degrees    T Axis 49 degrees    Interpretation ECG       Sinus tachycardia  Nonspecific ST and T wave abnormality  Abnormal ECG  No previous ECGs available  Confirmed by - EMERGENCY ROOM, PHYSICIAN ([id number]),  TORIBIO STRINGER (Flo) on 4/25/2022 7:00:27 AM     Respiratory Panel PCR - NP Swab     Status: Normal    Specimen: Nasopharyngeal; Swab   Result Value Ref Range    Adenovirus Not Detected Not Detected    Coronavirus Not Detected Not Detected    Human Metapneumovirus Not Detected Not Detected    Human Rhin/Enterovirus Not Detected Not Detected     Influenza A Not Detected Not Detected    Influenza A, H1 Not Detected Not Detected    Influenza A 2009 H1N1 Not Detected Not Detected    Influenza A, H3 Not Detected Not Detected    Influenza B Not Detected Not Detected    Parainfluenza Virus 1 Not Detected Not Detected    Parainfluenza Virus 2 Not Detected Not Detected    Parainfluenza Virus 3 Not Detected Not Detected    Parainfluenza Virus 4 Not Detected Not Detected    Respiratory Syncytial Virus A Not Detected Not Detected    Respiratory Syncytial Virus B Not Detected Not Detected    Chlamydia Pneumoniae Not Detected Not Detected    Mycoplasma Pneumoniae Not Detected Not Detected    Narrative    The [employer] Respiratory Viral Panel is a qualitative nucleic acid, multiplex, in vitro diagnostic test for the simultaneous detection and identification of multiple respiratory viral and bacterial nucleic acids in nasopharyngeal swabs collected in viral transport media from individual exhibiting signs and symptoms of respiratory infection. The assay has received FDA approval for the testing of nasopharyngeal (NP) swabs only. This test has been verified and is performed by the Infect[facility] Laboratory at [facility]. This test is used for clinical purposes and should not be regarded as investigational or for research. This laboratory is certified under the Clinical Laboratory Improvement Amendments of 1988 (CLIA-88) as qualified to perform high complexity clinical laboratory testing.   Respiratory Aerobic Bacterial Culture with Gram Stain     Status: None    Specimen: Expectorate; Sputum   Result Value Ref Range    Culture       >10 Squamous epithelial cells/low power field indicates oral contamination. Please recollect.    Gram Stain Result >10 Squamous epithelial cells/low power field     Gram Stain Result <25 PMNs/low power field     Gram Stain Result 3+ Mixed criselda    Respiratory Aerobic Bacterial Culture with Gram Stain     Status: None  (Preliminary result)    Specimen: Expectorate; Sputum   Result Value Ref Range    Culture Culture in progress     Culture 2+ Normal criselda     Gram Stain Result <10 Squamous epithelial cells/low power field     Gram Stain Result >25 PMNs/low power field     Gram Stain Result 3+ Mixed criselda    CBC with platelets differential     Status: Abnormal    Narrative    The following orders were created for panel order CBC with platelets differential.  Procedure                               Abnormality         Status                     ---------                               -----------         ------                     CBC with platelets and d...[[id number]]  Abnormal            Final result                 Please view results for these tests on the individual orders.   CBC with platelets differential     Status: Abnormal    Narrative    The following orders were created for panel order CBC with platelets differential.  Procedure                               Abnormality         Status                     ---------                               -----------         ------                     CBC with platelets and d...[[id number]]  Abnormal            Final result                 Please view results for these tests on the individual orders.   CBC with Platelets & Differential     Status: Abnormal    Narrative    The following orders were created for panel order CBC with Platelets & Differential.  Procedure                               Abnormality         Status                     ---------                               -----------         ------                     CBC with platelets and d...[[id number]]  Abnormal            Final result                 Please view results for these tests on the individual orders.   CBC with Platelets & Differential     Status: Abnormal    Narrative    The following orders were created for panel order CBC with Platelets & Differential.  Procedure                               Abnormality          Status                     ---------                               -----------         ------                     CBC with platelets and d...[[id number]]  Abnormal            Final result                 Please view results for these tests on the individual orders.

## 2022-04-27 NOTE — PLAN OF CARE
Pt alert and oriented x4. Up independent. Continues on 1.5 L O2. Pt having a nonproductive cough (Mucinex given). VSS. Pt resting well overnight. Will continue to monitor.

## 2022-04-27 NOTE — PROGRESS NOTES
[facility]    Medicine Progress Note - Hospitalist Service    Date of Admission:  4/24/2022    Assessment & Plan        Alexandro Lee is a 71 yo male with PMH significant for  moderate to severe persistent asthma, asymptomatic bronchiectasis, vocal cord dysfunction, postnasal drip, GERD, HTN, and NAFLD with cirrhosis who [facility] on 4/24/2022 with progressive severe shortness of breath and was found to be hypoxic with presumed asthma exacerbation.    #Acute hypoxemic respiratory failure in setting of acute asthma exacerbation  #Bronchiectasis  Significant leukocytosis and left shift on presentation however CXR did not suggest acute bacterial pneumonia. COVID-19/influenza/RSV negative. Procalcitonin very mildly elevated at 0.12.  Required 7 L O2 on presentation to keep saturations > 90%.  Underlying asthma known to be moderate-to-severe at baseline.  Followed by Dr. Jones at [facility]. Severe obstructive ventilatory defect seen again on PFTs 2019. Lifelong non-smoker. Retired  for [employer] without known environmental or occupational exposures.  - VBG mildly worse on repeat on 4/26 but stable, repeat in AM  - leukocytosis slowly improving, unclear if steroids are currently contributing to elevation as well   - no wheezing on exam but frequently productive cough and intermittent coarse breath sounds   - continue oral prednisone 40 mg daily (day 2)  - no h/o prior Pseudomonas pulmonary infection but did have previous positive MSSA cultures from sinuses. Continue empiric azithromycin.  - initial sputum culture contaminated, repeat on 4/26  - PRN supplemental oxygen, wean as tolerated, down to 1.5 L  - respiratory panel negative  - Continue PTA inhalers  - Nebulizers 4 times daily by RT  - Ambulate  - Pulmonary toilet  - consider CT chest if symptoms worsening or slow to improve/wean supplemental oxygen   - speech consulted, seen on 4/26 due to asthma  symptoms appeared to improve with speech therapy for vocal cord dysfunction (encouraged outpatient speech f/u for PVFM strategies as needed, otherwise no acute concerns needing speech assistance this hospital stay)     #Hypertension: BP intermittently elevated, but stable    PTA atenolol, felodipine, and hydralazine continued     #Hyponatremia   Sodium 130 on presentation. Likely related to poor PO intake and dehydration.  - encourage fluid intake  - sodium improved to 135 on 4/26     #NAFLD with cirrhosis  Mild transaminitis noted on presentation.  Known non-alcoholic fatty liver disease with reported diagnosis of cirrhosis. Appears compensated  - LFTs stable      Diet: Regular Diet Adult    DVT Prophylaxis: Enoxaparin (Lovenox) SQ  Klein Catheter: Not present  Central Lines: None  Cardiac Monitoring: None  Code Status: Full Code      Disposition Plan   Expected Discharge: 04/27/2022     Anticipated discharge location:  Awaiting care coordination huddle  Delays:          The patient's care was discussed with the Attending Physician, Dr. Lozada, Bedside Nurse and Patient.    Julia Gutierrez PA-C  Hospitalist Service  [facility]  Securely message with the Vocera Web Console (learn more here)  Text page via [employer] Paging/Directory         Clinically Significant Risk Factors Present on Admission                 ______________________________________________________________________    Interval History   Patient reports productive cough the if frequent. He does not feeling wheezing today. He is easily winded with increased activity but denies chest pain. He notes a good appetite.     Data reviewed today: I reviewed all medications, new labs and imaging results over the last 24 hours. I personally reviewed no images or EKG's today.    Physical Exam   Vital Signs: Temp: 98.1  F (36.7  C) Temp src: Oral BP: (!) 143/76 Pulse: 81   Resp: 20 SpO2: 94 % O2 Device: Nasal cannula Oxygen Delivery: 1.5  LPM  Weight: 200 lbs 12.8 oz  GENERAL:  Pleasant, cooperative, alert. WDWN.  Appears to feel unwell.  Nontoxic.   HEENT: Normocephalic, atraumatic.  Extra occular mm intact.  Sclera clear. PERRL.  Mucous membranes moist.     PULMONOLOGY: Moving air well, intermittent coarse breath sounds in bases. Frequent productive cough. No wheezing noted at rest.   CARDIAC: RRR, no audible murmur.  ABDOMEN: Soft, nontender    MUSCULOSKELETAL:  Moving x 4 spontaneously with CMS intact x4.  Normal bulk and tone.  No LE edema.     NEURO: Alert and oriented x3.  CN II-XII grossly intact and symmetric.  No focal deficits.    Data   Results for orders placed or performed during the hospital encounter of 04/24/22   XR Chest Port 1 View     Status: None    Narrative    EXAM: XR CHEST PORT 1 VIEW  LOCATION: [facility]  DATE/TIME: 4/24/2022 5:36 PM    INDICATION: SOB  COMPARISON: 11/21/2016      Impression    IMPRESSION: Heart and mediastinal size are normal. There is some pulmonary vascular congestion. Streaky opacity involving the lung bases may reflect some overlying atelectasis or sequela of infection. No pleural effusion or pneumothorax is seen.   XR Chest 2 Views     Status: None    Narrative    EXAM: XR CHEST 2 VW  LOCATION: [facility]  DATE/TIME: 4/26/2022 6:41 AM    INDICATION: acute asthma exacerbation, leukocytosis, follow up  COMPARISON: 04/24/2022      Impression    IMPRESSION: Stable cardiomediastinal silhouette. Persistent bibasilar infiltrates. Symmetric nodular density mid chest bilaterally questionably prominent nipple shadow. Additional nodule densities along the margin of the right chest. Uncertain if there   is an external artifact in this region. For example a nodule not excluded to anterior ribs 5 and 6 on the right, 1.3 cm in size. Follow-up to confirm resolution of the pulmonary opacities recommended. Consider baseline chest CT follow-up. No pleural   effusion.    Comprehensive metabolic panel     Status: Abnormal   Result Value Ref Range    Sodium 130 (L) 133 - 144 mmol/L    Potassium 4.4 3.4 - 5.3 mmol/L    Chloride 95 94 - 109 mmol/L    Carbon Dioxide (CO2) 30 20 - 32 mmol/L    Anion Gap 5 3 - 14 mmol/L    Urea Nitrogen 16 7 - 30 mg/dL    Creatinine 0.37 (L) 0.66 - 1.25 mg/dL    Calcium 9.4 8.5 - 10.1 mg/dL    Glucose 120 (H) 70 - 99 mg/dL    Alkaline Phosphatase 105 40 - 150 U/L    AST 84 (H) 0 - 45 U/L    ALT 96 (H) 0 - 70 U/L    Protein Total 8.9 (H) 6.8 - 8.8 g/dL    Albumin 2.9 (L) 3.4 - 5.0 g/dL    Bilirubin Total 0.7 0.2 - 1.3 mg/dL    GFR Estimate >90 >60 mL/min/1.73m2   Troponin I     Status: Normal   Result Value Ref Range    Troponin I High Sensitivity 6 <79 ng/L   Nt probnp inpatient (BNP)     Status: Normal   Result Value Ref Range    N terminal Pro BNP Inpatient 215 0 - 900 pg/mL   Symptomatic; Unknown Influenza A/B & SARS-CoV2 (COVID-19) Virus PCR Multiplex Nasopharyngeal     Status: Normal    Specimen: Nasopharyngeal; Swab   Result Value Ref Range    Influenza A PCR Negative Negative    Influenza B PCR Negative Negative    RSV PCR Negative Negative    SARS CoV2 PCR Negative Negative    Narrative    Testing was performed using the Xpert Xpress CoV2/Flu/RSV Assay on the [facility] GeneXpert Instrument. This test should be ordered for the detection of SARS-CoV-2 and influenza viruses in individuals who meet clinical and/or epidemiological criteria. Test performance is unknown in asymptomatic patients. This test is for in vitro diagnostic use under the FDA EUA for laboratories certified under CLIA to perform high or moderate complexity testing. This test has not been FDA cleared or approved. A negative result does not rule out the presence of PCR inhibitors in the specimen or target RNA in concentration below the limit of detection for the assay. If only one viral target is positive but coinfection with multiple targets is suspected, the sample should be  re-tested with another FDA cleared, approved, or authorized test, if coinfection would change clinical management. This test was validated by the [facility] [employer] [facility]. These laboratories are certified under the Clinical  Laboratory Improvement Amendments of 1988 (CLIA-88) as qualified to perform high complexity laboratory testing.   CBC with platelets and differential     Status: Abnormal   Result Value Ref Range    WBC Count 29.2 (H) 4.0 - 11.0 10e3/uL    RBC Count 5.28 4.40 - 5.90 10e6/uL    Hemoglobin 15.5 13.3 - 17.7 g/dL    Hematocrit 46.4 40.0 - 53.0 %    MCV 88 78 - 100 fL    MCH 29.4 26.5 - 33.0 pg    MCHC 33.4 31.5 - 36.5 g/dL    RDW 12.1 10.0 - 15.0 %    Platelet Count 423 150 - 450 10e3/uL    % Neutrophils 89 %    % Lymphocytes 6 %    % Monocytes 4 %    % Eosinophils 0 %    % Basophils 0 %    % Immature Granulocytes 1 %    NRBCs per 100 WBC 0 <1 /100    Absolute Neutrophils 25.9 (H) 1.6 - 8.3 10e3/uL    Absolute Lymphocytes 1.8 0.8 - 5.3 10e3/uL    Absolute Monocytes 1.1 0.0 - 1.3 10e3/uL    Absolute Eosinophils 0.0 0.0 - 0.7 10e3/uL    Absolute Basophils 0.1 0.0 - 0.2 10e3/uL    Absolute Immature Granulocytes 0.3 <=0.4 10e3/uL    Absolute NRBCs 0.0 10e3/uL   Extra Tube ([place] Draw)     Status: None    Narrative    The following orders were created for panel order Extra Tube ([place] Draw).  Procedure                               Abnormality         Status                     ---------                               -----------         ------                     Extra Heparinized Syringe[[id number]]                        Final result               Extra Green Top (Lithium...[[id number]]                      Final result                 Please view results for these tests on the individual orders.   Extra Heparinized Syringe     Status: None   Result Value Ref Range    Hold Specimen JIC    Extra Green Top (Lithium Heparin) ON ICE     Status: None   Result Value Ref Range    Hold Specimen JIC     Blood gas venous and oxyhgb     Status: Abnormal   Result Value Ref Range    pH Venous 7.45 (H) 7.32 - 7.43    pCO2 Venous 50 40 - 50 mm Hg    pO2 Venous 60 (H) 25 - 47 mm Hg    Bicarbonate Venous 35 (H) 21 - 28 mmol/L    FIO2 4     Oxyhemoglobin Venous 91 (H) 70 - 75 %    Base Excess/Deficit (+/-) 8.8 (H) -7.7 - 1.9 mmol/L   CBC with platelets and differential     Status: Abnormal   Result Value Ref Range    WBC Count 18.0 (H) 4.0 - 11.0 10e3/uL    RBC Count 4.66 4.40 - 5.90 10e6/uL    Hemoglobin 14.0 13.3 - 17.7 g/dL    Hematocrit 41.2 40.0 - 53.0 %    MCV 88 78 - 100 fL    MCH 30.0 26.5 - 33.0 pg    MCHC 34.0 31.5 - 36.5 g/dL    RDW 12.3 10.0 - 15.0 %    Platelet Count 309 150 - 450 10e3/uL    % Neutrophils 93 %    % Lymphocytes 3 %    % Monocytes 2 %    % Eosinophils 0 %    % Basophils 0 %    % Immature Granulocytes 2 %    NRBCs per 100 WBC 0 <1 /100    Absolute Neutrophils 16.7 (H) 1.6 - 8.3 10e3/uL    Absolute Lymphocytes 0.6 (L) 0.8 - 5.3 10e3/uL    Absolute Monocytes 0.4 0.0 - 1.3 10e3/uL    Absolute Eosinophils 0.0 0.0 - 0.7 10e3/uL    Absolute Basophils 0.0 0.0 - 0.2 10e3/uL    Absolute Immature Granulocytes 0.3 <=0.4 10e3/uL    Absolute NRBCs 0.0 10e3/uL   Procalcitonin     Status: Abnormal   Result Value Ref Range    Procalcitonin 0.12 (H) <0.05 ng/mL   Extra Tube     Status: None    Narrative    The following orders were created for panel order Extra Tube.  Procedure                               Abnormality         Status                     ---------                               -----------         ------                     Extra Green Top (Lithium...[[id number]]                      Final result                 Please view results for these tests on the individual orders.   Extra Green Top (Lithium Heparin) Tube     Status: None   Result Value Ref Range    Hold Specimen [facility]    Comprehensive metabolic panel     Status: Abnormal   Result Value Ref Range    Sodium 135 133 - 144 mmol/L    Potassium  3.7 3.4 - 5.3 mmol/L    Chloride 100 94 - 109 mmol/L    Carbon Dioxide (CO2) 32 20 - 32 mmol/L    Anion Gap 3 3 - 14 mmol/L    Urea Nitrogen 19 7 - 30 mg/dL    Creatinine 0.36 (L) 0.66 - 1.25 mg/dL    Calcium 8.6 8.5 - 10.1 mg/dL    Glucose 135 (H) 70 - 99 mg/dL    Alkaline Phosphatase 79 40 - 150 U/L    AST 81 (H) 0 - 45 U/L     (H) 0 - 70 U/L    Protein Total 7.3 6.8 - 8.8 g/dL    Albumin 2.5 (L) 3.4 - 5.0 g/dL    Bilirubin Total 0.6 0.2 - 1.3 mg/dL    GFR Estimate >90 >60 mL/min/1.73m2   Blood gas venous     Status: Abnormal   Result Value Ref Range    pH Venous 7.29 (L) 7.32 - 7.43    pCO2 Venous 63 (H) 40 - 50 mm Hg    pO2 Venous 54 (H) 25 - 47 mm Hg    Bicarbonate Venous 30 (H) 21 - 28 mmol/L    Base Excess/Deficit (+/-) 1.9 -7.7 - 1.9 mmol/L    FIO2 3    Magnesium     Status: Normal   Result Value Ref Range    Magnesium 2.3 1.6 - 2.3 mg/dL   CBC with platelets and differential     Status: Abnormal   Result Value Ref Range    WBC Count 17.6 (H) 4.0 - 11.0 10e3/uL    RBC Count 4.74 4.40 - 5.90 10e6/uL    Hemoglobin 13.8 13.3 - 17.7 g/dL    Hematocrit 41.9 40.0 - 53.0 %    MCV 88 78 - 100 fL    MCH 29.1 26.5 - 33.0 pg    MCHC 32.9 31.5 - 36.5 g/dL    RDW 12.5 10.0 - 15.0 %    Platelet Count 376 150 - 450 10e3/uL    % Neutrophils 89 %    % Lymphocytes 5 %    % Monocytes 5 %    % Eosinophils 0 %    % Basophils 0 %    % Immature Granulocytes 1 %    NRBCs per 100 WBC 0 <1 /100    Absolute Neutrophils 15.5 (H) 1.6 - 8.3 10e3/uL    Absolute Lymphocytes 0.9 0.8 - 5.3 10e3/uL    Absolute Monocytes 0.9 0.0 - 1.3 10e3/uL    Absolute Eosinophils 0.0 0.0 - 0.7 10e3/uL    Absolute Basophils 0.1 0.0 - 0.2 10e3/uL    Absolute Immature Granulocytes 0.1 <=0.4 10e3/uL    Absolute NRBCs 0.0 10e3/uL   EKG 12-lead, tracing only     Status: None   Result Value Ref Range    Systolic Blood Pressure  mmHg    Diastolic Blood Pressure  mmHg    Ventricular Rate 114 BPM    Atrial Rate 114 BPM    VA Interval 174 ms    QRS Duration 76  ms     ms    QTc 446 ms    P Axis 64 degrees    R AXIS 87 degrees    T Axis 49 degrees    Interpretation ECG       Sinus tachycardia  Nonspecific ST and T wave abnormality  Abnormal ECG  No previous ECGs available  Confirmed by - EMERGENCY ROOM, PHYSICIAN ([id number]),  TORIBIO STRINGER (Flo) on 4/25/2022 7:00:27 AM     Respiratory Panel PCR - NP Swab     Status: Normal    Specimen: Nasopharyngeal; Swab   Result Value Ref Range    Adenovirus Not Detected Not Detected    Coronavirus Not Detected Not Detected    Human Metapneumovirus Not Detected Not Detected    Human Rhin/Enterovirus Not Detected Not Detected    Influenza A Not Detected Not Detected    Influenza A, H1 Not Detected Not Detected    Influenza A 2009 H1N1 Not Detected Not Detected    Influenza A, H3 Not Detected Not Detected    Influenza B Not Detected Not Detected    Parainfluenza Virus 1 Not Detected Not Detected    Parainfluenza Virus 2 Not Detected Not Detected    Parainfluenza Virus 3 Not Detected Not Detected    Parainfluenza Virus 4 Not Detected Not Detected    Respiratory Syncytial Virus A Not Detected Not Detected    Respiratory Syncytial Virus B Not Detected Not Detected    Chlamydia Pneumoniae Not Detected Not Detected    Mycoplasma Pneumoniae Not Detected Not Detected    Narrative    The [facility] Respiratory Viral Panel is a qualitative nucleic acid, multiplex, in vitro diagnostic test for the simultaneous detection and identification of multiple respiratory viral and bacterial nucleic acids in nasopharyngeal swabs collected in viral transport media from individual exhibiting signs and symptoms of respiratory infection. The assay has received FDA approval for the testing of nasopharyngeal (NP) swabs only. This test has been verified and is performed by the [facility] Laboratory at [facility]. This test is used for clinical purposes and should not be regarded as investigational or for research. This laboratory is  certified under the Clinical Laboratory Improvement Amendments of 1988 (CLIA-88) as qualified to perform high complexity clinical laboratory testing.   Respiratory Aerobic Bacterial Culture with Gram Stain     Status: None    Specimen: Expectorate; Sputum   Result Value Ref Range    Culture       >10 Squamous epithelial cells/low power field indicates oral contamination. Please recollect.    Gram Stain Result >10 Squamous epithelial cells/low power field     Gram Stain Result <25 PMNs/low power field     Gram Stain Result 3+ Mixed criselda    Respiratory Aerobic Bacterial Culture with Gram Stain     Status: None (Preliminary result)    Specimen: Expectorate; Sputum   Result Value Ref Range    Gram Stain Result <10 Squamous epithelial cells/low power field     Gram Stain Result >25 PMNs/low power field     Gram Stain Result 3+ Mixed criselda    CBC with platelets differential     Status: Abnormal    Narrative    The following orders were created for panel order CBC with platelets differential.  Procedure                               Abnormality         Status                     ---------                               -----------         ------                     CBC with platelets and d...[[id number]]  Abnormal            Final result                 Please view results for these tests on the individual orders.   CBC with platelets differential     Status: Abnormal    Narrative    The following orders were created for panel order CBC with platelets differential.  Procedure                               Abnormality         Status                     ---------                               -----------         ------                     CBC with platelets and d...[[id number]]  Abnormal            Final result                 Please view results for these tests on the individual orders.   CBC with Platelets & Differential     Status: Abnormal    Narrative    The following orders were created for panel order CBC with Platelets  & Differential.  Procedure                               Abnormality         Status                     ---------                               -----------         ------                     CBC with platelets and d...[[id number]]  Abnormal            Final result                 Please view results for these tests on the individual orders.

## 2022-04-28 VITALS
WEIGHT: 196.4 LBS | RESPIRATION RATE: 20 BRPM | DIASTOLIC BLOOD PRESSURE: 76 MMHG | TEMPERATURE: 98.2 F | OXYGEN SATURATION: 94 % | HEIGHT: 77 IN | HEART RATE: 98 BPM | SYSTOLIC BLOOD PRESSURE: 142 MMHG | BODY MASS INDEX: 23.19 KG/M2

## 2022-04-28 LAB
BACTERIA SPT CULT: NORMAL
GRAM STAIN RESULT: NORMAL
MAGNESIUM SERPL-MCNC: 2.2 MG/DL (ref 1.6–2.3)
POTASSIUM BLD-SCNC: 3.3 MMOL/L (ref 3.4–5.3)

## 2022-04-28 PROCEDURE — 250N000009 HC RX 250: Performed by: HOSPITALIST

## 2022-04-28 PROCEDURE — 83735 ASSAY OF MAGNESIUM: CPT | Performed by: PHYSICIAN ASSISTANT

## 2022-04-28 PROCEDURE — 99239 HOSP IP/OBS DSCHRG MGMT >30: CPT | Performed by: PHYSICIAN ASSISTANT

## 2022-04-28 PROCEDURE — 94640 AIRWAY INHALATION TREATMENT: CPT

## 2022-04-28 PROCEDURE — 999N000157 HC STATISTIC RCP TIME EA 10 MIN

## 2022-04-28 PROCEDURE — 250N000013 HC RX MED GY IP 250 OP 250 PS 637: Performed by: PHYSICIAN ASSISTANT

## 2022-04-28 PROCEDURE — 84132 ASSAY OF SERUM POTASSIUM: CPT | Performed by: PHYSICIAN ASSISTANT

## 2022-04-28 PROCEDURE — 94640 AIRWAY INHALATION TREATMENT: CPT | Mod: 76

## 2022-04-28 PROCEDURE — 250N000012 HC RX MED GY IP 250 OP 636 PS 637: Performed by: HOSPITALIST

## 2022-04-28 PROCEDURE — 250N000013 HC RX MED GY IP 250 OP 250 PS 637: Performed by: HOSPITALIST

## 2022-04-28 PROCEDURE — 36415 COLL VENOUS BLD VENIPUNCTURE: CPT | Performed by: PHYSICIAN ASSISTANT

## 2022-04-28 RX ORDER — BENZONATATE 100 MG/1
100 CAPSULE ORAL 3 TIMES DAILY
Qty: 15 CAPSULE | Refills: 0 | Status: SHIPPED | OUTPATIENT
Start: 2022-04-28 | End: 2022-05-03

## 2022-04-28 RX ORDER — ATENOLOL 100 MG/1
100 TABLET ORAL 2 TIMES DAILY
COMMUNITY
Start: 2022-04-28 | End: 2022-04-28

## 2022-04-28 RX ORDER — POTASSIUM CHLORIDE 1500 MG/1
40 TABLET, EXTENDED RELEASE ORAL ONCE
Status: DISCONTINUED | OUTPATIENT
Start: 2022-04-28 | End: 2022-04-28 | Stop reason: HOSPADM

## 2022-04-28 RX ORDER — IPRATROPIUM BROMIDE AND ALBUTEROL SULFATE 2.5; .5 MG/3ML; MG/3ML
3 SOLUTION RESPIRATORY (INHALATION) 4 TIMES DAILY
Qty: 90 ML | Refills: 0 | Status: SHIPPED | OUTPATIENT
Start: 2022-04-28 | End: 2023-07-13

## 2022-04-28 RX ORDER — PREDNISONE 20 MG/1
TABLET ORAL
Qty: 15 TABLET | Refills: 0 | Status: SHIPPED | OUTPATIENT
Start: 2022-04-28 | End: 2022-10-26

## 2022-04-28 RX ORDER — POTASSIUM CHLORIDE 1500 MG/1
40 TABLET, EXTENDED RELEASE ORAL 2 TIMES DAILY
Qty: 4 TABLET | Refills: 0 | Status: SHIPPED | OUTPATIENT
Start: 2022-04-28 | End: 2022-04-29

## 2022-04-28 RX ORDER — GUAIFENESIN AND DEXTROMETHORPHAN HYDROBROMIDE 600; 30 MG/1; MG/1
2 TABLET, EXTENDED RELEASE ORAL EVERY 12 HOURS
Qty: 30 TABLET | Refills: 0 | Status: SHIPPED | OUTPATIENT
Start: 2022-04-28 | End: 2022-10-26

## 2022-04-28 RX ADMIN — FLUTICASONE PROPIONATE 2 SPRAY: 50 SPRAY, METERED NASAL at 09:00

## 2022-04-28 RX ADMIN — PREDNISONE 40 MG: 20 TABLET ORAL at 08:54

## 2022-04-28 RX ADMIN — IPRATROPIUM BROMIDE AND ALBUTEROL SULFATE 3 ML: 2.5; .5 SOLUTION RESPIRATORY (INHALATION) at 07:43

## 2022-04-28 RX ADMIN — IPRATROPIUM BROMIDE AND ALBUTEROL SULFATE 3 ML: 2.5; .5 SOLUTION RESPIRATORY (INHALATION) at 15:44

## 2022-04-28 RX ADMIN — HYDRALAZINE HYDROCHLORIDE 50 MG: 50 TABLET, FILM COATED ORAL at 08:54

## 2022-04-28 RX ADMIN — ATENOLOL 50 MG: 50 TABLET ORAL at 08:56

## 2022-04-28 RX ADMIN — FELODIPINE 10 MG: 5 TABLET, EXTENDED RELEASE ORAL at 08:54

## 2022-04-28 RX ADMIN — IPRATROPIUM BROMIDE AND ALBUTEROL SULFATE 3 ML: 2.5; .5 SOLUTION RESPIRATORY (INHALATION) at 11:21

## 2022-04-28 RX ADMIN — FAMOTIDINE 20 MG: 20 TABLET ORAL at 08:55

## 2022-04-28 RX ADMIN — AZITHROMYCIN MONOHYDRATE 500 MG: 250 TABLET ORAL at 08:54

## 2022-04-28 ASSESSMENT — ACTIVITIES OF DAILY LIVING (ADL)
ADLS_ACUITY_SCORE: 5

## 2022-04-28 NOTE — PROGRESS NOTES
Pt discharged at 1745 to home. Pt is stable. Writer reviewed AVS with pt and pt verbalized understanding.  Pt's belongings were returned and discharge medications given. He was picked up by his wife. NA escorted pt out of the hospital.

## 2022-04-28 NOTE — PROGRESS NOTES
Patient has been assessed for Home Oxygen needs.     Pulse oximetry (SpO2) and Oxygen flow readings:    SpO2 = 93 % on room air at rest while awake.    SpO2 improved to  95% on 2  liters/minute at rest.    SpO2 =  86% on room air during activity/with exercise.    *SpO2 improved to 90  % on 2  liters/minute during activity/with exercise.

## 2022-04-28 NOTE — PLAN OF CARE
Pt alert and oriented x4. Up independent. Remains on 1L O2. Tylenol given for a mild headache. Pt resting well overnight. VSS. Will continue to monitor.

## 2022-04-28 NOTE — PROGRESS NOTES
Pt ambulated to the bathroom at room air, SpO2 81% at room air after bathroom . O2 placed on 2L/min via nasal canula. SpO2 went up to 89%-90% with O2. RN notified

## 2022-04-28 NOTE — PLAN OF CARE
Pt a/o x4. VSS. On 2 L nasal canula. Denied pain. Nonproductive congested couch. LS expiratory wheezes. Independent in room. Walked in hallway with support staff. Pt discharging this evening with oxygen and nebs.

## 2022-04-28 NOTE — DISCHARGE SUMMARY
[facility]  Discharge Summary        Alexandro Lee MRN# [id number]   YOB: 1950 Age: 72 year old     Date of Admission:  4/24/2022  Date of Discharge:  4/28/2022  5:46 PM  Admitting Physician:  Kvng Tobar MD  Discharge Physician: Perlita Contreras PA-C  Discharging Service: Hospitalist     Primary Provider: Garrett Sotelo  Primary Care Physician Phone Number: [phone]         Discharge Diagnoses/Problem Oriented Hospital Course (Providers):    Alexandro Lee was admitted on 4/24/2022 by Kvng Tobar MD and I would refer you to their history and physical.  The following problems were addressed during his hospitalization:    1. Acute Hypoxic respiratory failure due to acute asthma exacerbation.   2. Hx of Bronchiectasis   3. Hyponatremia - resolved     Secondary Dx:  HTN  NAFLD with cirrhosis            Code Status:      Full Code        Brief Hospital Stay Summary Sent Home With Patient in AVS:        Reason for your hospital stay      You were admitted for bronchitis and hypoxic respiratory failure. You   were placed on antibiotics, steroids and nebulizer with improved sxs. You   are safe for discharge with home O2, steroid taper and duonebs and   medication for cough. Make appt with PCP in 1 week for follow up.         Alexandro Lee is a 71 yo male with PMH significant for  moderate to severe persistent asthma, asymptomatic bronchiectasis, vocal cord dysfunction, postnasal drip, GERD, HTN, and NAFLD with cirrhosis who presented to [facility] ED on 4/24/2022 with progressive severe shortness of breath and was found to be hypoxic with presumed asthma exacerbation.     #Acute hypoxemic respiratory failure in setting of acute asthma exacerbation  #Bronchiectasis  Significant leukocytosis and left shift on presentation however CXR did not suggest acute bacterial pneumonia. COVID-19/influenza/RSV negative. Procalcitonin very mildly elevated at 0.12.  Required 7 L O2 on  presentation to keep saturations > 90%.  Underlying asthma known to be moderate-to-severe at baseline.  Followed by Dr. Jones at [facility]. Severe obstructive ventilatory defect seen again on PFTs 2019. Lifelong non-smoker. Retired  for equal [employer] without known environmental or occupational exposures.  - VBG stable  Pt admitted, started on empiric azithromycin. Started Prednisone at 40mg.   - leukocytosis improving, 17.6 >> 11.1  - no h/o prior Pseudomonas pulmonary infection but did have previous positive MSSA cultures from sinuses.   - PRN supplemental oxygen, wean as tolerated, down to 1 L but still dropped to 88% with activity  - Pulmonary toilet  - due to ongoing improvement, no current need for further imaging with CT of chest   - speech consulted, seen on 4/26 due to asthma symptoms appeared to improve with speech therapy for vocal cord dysfunction (encouraged outpatient speech f/u for PVFM strategies as needed, otherwise no acute concerns needing speech assistance this hospital stay)   -pt discharged in stable condition on prednisone taper, supplemental O2, nebulizer. 5 day zpak finished while in house.   - Follow up with PCP in 1 week    #Hypertension: stable  - continue PTA atenolol, felodipine, and hydralazine  - No changes made      #Hyponatremia   Sodium 130 on presentation. Likely related to poor PO intake and dehydration.  - Na improved with gentle hydration- sodium improved to 135 on 4/26     #NAFLD with cirrhosis  Mild transaminitis noted on presentation.  Known non-alcoholic fatty liver disease with reported diagnosis of cirrhosis. Appears compensated  - LFTs stable                   Pending Results:        Unresulted Labs Ordered in the Past 30 Days of this Admission     No orders found from 3/25/2022 to 4/25/2022.            Discharge Instructions and Follow-Up:      Follow-up Appointments     Follow-up and recommended labs and tests       Follow up with  primary care provider, Garrett Sotelo, within 7 days for   hospital follow- up.  The following labs/tests are recommended: BMP.               Discharge Disposition:      Discharged to home         Discharge Medications:        Current Discharge Medication List      START taking these medications    Details   benzonatate (TESSALON) 100 MG capsule Take 1 capsule (100 mg) by mouth 3 times daily for 5 days  Qty: 15 capsule, Refills: 0    Associated Diagnoses: Exacerbation of asthma, unspecified asthma severity, unspecified whether persistent      ipratropium - albuterol 0.5 mg/2.5 mg/3 mL (DUONEB) 0.5-2.5 (3) MG/3ML neb solution Take 1 vial (3 mLs) by nebulization 4 times daily  Qty: 90 mL, Refills: 0    Associated Diagnoses: Exacerbation of asthma, unspecified asthma severity, unspecified whether persistent      potassium chloride ER (KLOR-CON M) 20 MEQ CR tablet Take 2 tablets (40 mEq) by mouth 2 times daily for 2 doses  Qty: 4 tablet, Refills: 0    Associated Diagnoses: Exacerbation of asthma, unspecified asthma severity, unspecified whether persistent      predniSONE (DELTASONE) 20 MG tablet Take 2 tabs by mouth daily x 3 days, then 1.5 tabs daily x 3 days, then 1 tab daily x 3 days, then 1/2 tab daily x 3 days.  Qty: 15 tablet, Refills: 0    Associated Diagnoses: Exacerbation of asthma, unspecified asthma severity, unspecified whether persistent         CONTINUE these medications which have CHANGED    Details   !! atenolol (TENORMIN) 100 MG tablet Take 1 tablet (100 mg) by mouth 2 times daily      dextromethorphan-guaiFENesin (MUCINEX DM)  MG 12 hr tablet Take 2 tablets by mouth every 12 hours  Qty: 30 tablet, Refills: 0    Associated Diagnoses: Exacerbation of asthma, unspecified asthma severity, unspecified whether persistent       !! - Potential duplicate medications found. Please discuss with provider.      CONTINUE these medications which have NOT CHANGED    Details   albuterol (PROAIR HFA/PROVENTIL  HFA/VENTOLIN HFA) 108 (90 Base) MCG/ACT inhaler Inhale 1-2 puffs into the lungs every 4 hours as needed for shortness of breath / dyspnea or wheezing  Qty: 2 Inhaler, Refills: 0    Comments: Pharmacy may dispense brand covered by insurance (Proair, or proventil or ventolin or generic albuterol inhaler)  Associated Diagnoses: Moderate persistent asthma without complication      aspirin 81 MG tablet Take 81 mg by mouth daily      !! atenolol (TENORMIN) 100 MG tablet Take 0.5 tablets (50 mg) by mouth 2 times daily  Qty: 90 tablet, Refills: 3    Associated Diagnoses: Benign essential hypertension      cetirizine (ZYRTEC) 10 MG tablet Take 1 tablet (10 mg) by mouth every evening  Qty: 90 tablet, Refills: 11    Associated Diagnoses: Chronic allergic rhinitis due to animal hair and dander      cholecalciferol (VITAMIN  -D) 1000 UNITS capsule Take 2 capsules by mouth daily       Dextromethorphan-Guaifenesin  MG TB12       DM-APAP-CPM (CORICIDIN HBP FLU PO)       felodipine ER (PLENDIL) 10 MG 24 hr tablet Take 1 tablet (10 mg) by mouth daily  Qty: 90 tablet, Refills: 3    Associated Diagnoses: Benign essential hypertension      Fluticasone-Umeclidin-Vilanterol (TRELEGY ELLIPTA) 100-62.5-25 MCG/INH oral inhaler Inhale 1 puff into the lungs daily      hydrALAZINE (APRESOLINE) 50 MG tablet Take 1 tablet (50 mg) by mouth 2 times daily - fasting annual exam due August 2022  Qty: 180 tablet, Refills: 1    Comments: Please advise patient: fasting annual exam with Dr Sotelo due August 2022  Associated Diagnoses: Benign essential hypertension      ibuprofen (ADVIL/MOTRIN) 100 MG tablet Take 100 mg by mouth every 4 hours as needed      magnesium 250 MG tablet Take 1 tablet by mouth daily      montelukast (SINGULAIR) 10 MG tablet TAKE 1 TABLET AT BEDTIME  Qty: 90 tablet, Refills: 3    Associated Diagnoses: Moderate persistent asthma without complication      Multiple Vitamins-Minerals (CENTRUM SILVER) per tablet Take 1 tablet by  "mouth daily      OMEPRAZOLE PO Take 40 mg by mouth daily      azelastine (ASTELIN) 0.1 % nasal spray Spray 2 sprays into both nostrils 2 times daily  Qty: 30 mL, Refills: 11    Associated Diagnoses: Chronic maxillary sinusitis      fluticasone (FLONASE) 50 MCG/ACT spray Spray 1-2 sprays into both nostrils daily  Qty: 1 Bottle, Refills: 11    Associated Diagnoses: Chronic allergic rhinitis due to animal hair and dander       !! - Potential duplicate medications found. Please discuss with provider.      STOP taking these medications       ADVAIR DISKUS 250-50 MCG/DOSE inhaler Comments:   Reason for Stopping:                 Allergies:         Allergies   Allergen Reactions     Amoxicillin Hives     hives     Lisinopril      Cough       Tetanus Toxoids      DPT as a child caused severe swelling of arm           Consultations This Hospital Stay:      No consultations were requested during this admission         Condition and Physical on Discharge:      Discharge condition: Stable   Vitals: Blood pressure 139/73, pulse 89, temperature 97.8  F (36.6  C), temperature source Oral, resp. rate 20, height 1.956 m (6' 5\"), weight 89.1 kg (196 lb 6.4 oz), SpO2 94 %.  196 lbs 6.4 oz      GENERAL:  Comfortable.  PSYCH: pleasant, oriented, No acute distress.  HEENT:  PERRLA. Normal conjunctiva, normal hearing, nasal mucosa and Oropharynx are normal.  NECK:  Supple, no neck vein distention, adenopathy or bruits, normal thyroid.  HEART:  Normal S1, S2 with no murmur, no pericardial rub, gallops or S3 or S4.  LUNGS:  Clear to auscultation, normal Respiratory effort. No wheezing, rales or ronchi.  ABDOMEN:  Soft, no hepatosplenomegaly, normal bowel sounds. Non-tender, non distended.   EXTREMITIES:  No pedal edema, +2 pulses bilateral and equal.  SKIN:  Dry to touch, No rash, wound or ulcerations.  NEUROLOGIC:  CN 2-12 intact, BL 5/5 symmetric upper and lower extremity strength, sensation is intact with no focal deficits.         " Discharge Time:      >30 mins         Image Results From This Hospital Stay (For Non-EPIC Providers):        Results for orders placed or performed during the hospital encounter of 04/24/22   XR Chest Port 1 View    Narrative    EXAM: XR CHEST PORT 1 VIEW  LOCATION: [facility]  DATE/TIME: 4/24/2022 5:36 PM    INDICATION: SOB  COMPARISON: 11/21/2016      Impression    IMPRESSION: Heart and mediastinal size are normal. There is some pulmonary vascular congestion. Streaky opacity involving the lung bases may reflect some overlying atelectasis or sequela of infection. No pleural effusion or pneumothorax is seen.   XR Chest 2 Views    Narrative    EXAM: XR CHEST 2 VW  LOCATION: [facility]  DATE/TIME: 4/26/2022 6:41 AM    INDICATION: acute asthma exacerbation, leukocytosis, follow up  COMPARISON: 04/24/2022      Impression    IMPRESSION: Stable cardiomediastinal silhouette. Persistent bibasilar infiltrates. Symmetric nodular density mid chest bilaterally questionably prominent nipple shadow. Additional nodule densities along the margin of the right chest. Uncertain if there   is an external artifact in this region. For example a nodule not excluded to anterior ribs 5 and 6 on the right, 1.3 cm in size. Follow-up to confirm resolution of the pulmonary opacities recommended. Consider baseline chest CT follow-up. No pleural   effusion.

## 2022-04-28 NOTE — PROGRESS NOTES
Oxygen Documentation:   I certify that this patient, Alexandro Lee has been under my care (or a nurse practitioner or physican's assistant working with me). This is the face-to-face encounter for oxygen medical necessity.      Alexandro Lee is now in a chronic stable state and continues to require supplemental oxygen. Patient has continued oxygen desaturation due to Severe Persistent Asthma J45.50  Bronchiectasis J47.9  Acute respiratory failure with hypoxia .    Alternative treatment(s) tried or considered and deemed clinically infective for treatment of Severe Persistent Asthma J45.50  Bronchiectasis J47.9 include nebulizers, inhalers, steroids, and pulmonary toileting.  If portability is ordered, is the patient mobile within the home? yes    **Patients who qualify for home O2 coverage under the CMS guidelines require ABG tests or O2 sat readings obtained closest to, but no earlier than 2 days prior to the discharge, as evidence of the need for home oxygen therapy. Testing must be performed while patient is in the chronic stable state. See notes for O2 sats.**

## 2022-04-28 NOTE — PROGRESS NOTES
Cares between 0700 - 1900 hrs    Primary Diagnosis: Acute Asthma exacerbation with hypoxemia    Pt alert and oriented X 4; VSS; Afebrile;  Weaned down to 1L of oxygen with saturation between 92 - 93%. Dyspnea with activity noted. Pt maintains a fairly good appetite. Able to ambulate in hallway with SBA. Independent in the room. Continues on Prednisone, nebulizers, Azithromycin and PTA inhalers. PRN Tessalon given to manage cough. Possible discharge tomorrow if able to be weaned off oxygen

## 2022-04-29 ENCOUNTER — PATIENT OUTREACH (OUTPATIENT)
Dept: NURSING | Facility: CLINIC | Age: 72
End: 2022-04-29
Payer: MEDICARE

## 2022-04-29 ENCOUNTER — TELEPHONE (OUTPATIENT)
Dept: FAMILY MEDICINE | Facility: CLINIC | Age: 72
End: 2022-04-29

## 2022-04-29 NOTE — LETTER
[facility] CARE COORDINATION  [address]  [place] [facility] [address]      April 29, 2022      Alexandro Lee  [address] [facility] [address]      Dear Alexandro,    I am a clinic community health worker who works with Garrett Sotelo MD at [facility]. I wanted to thank you for spending the time to talk with me.  Below is a description of clinic care coordination and how I can further assist you.      The clinic care coordination team is made up of a registered nurse,  and community health worker who understand the health care system. The goal of clinic care coordination is to help you manage your health and improve access to the health care system in the most efficient manner. The team can assist you in meeting your health care goals by providing education, coordinating services, strengthening the communication among your providers and supporting you with any resource needs.    Please feel free to contact me at [phone] with any questions or concerns. We are focused on providing you with the highest-quality healthcare experience possible and that all starts with you.     Sincerely,     NEMESIO Baig  Clinic Care Coordination  [facility] Clinics: [place] [place], [place], [facility], and [place] [facility]  Phone: [phone]

## 2022-04-29 NOTE — PROGRESS NOTES
Clinic Care Coordination Contact  Community Health Worker Initial Outreach    CHW Initial Information Gathering:  Referral Source: IP Handoff    CHW introduced self, gave contact info, and offered the CC program.     Patient accepts CC: No, Not at this time. Patient will be sent Care Coordination introduction letter for future reference.     Plan: Care Coordinator will send care coordination introduction letter with care coordinator contact information and explanation of care coordination services via mail. Care Coordinator will do no further outreaches at this time.      NEMESIO Baig  Clinic Care Coordination  [facility] Clinics: [place] [place], [place], [place], and [place] [facility]  Phone: [phone]      [facility]: Post-Discharge Note  SITUATION                                                      Admission:    Admission Date: 04/24/22   Reason for Admission: EXACERBATION OF ASTHMA  Discharge:   Discharge Date: 04/28/22    BACKGROUND                                                      Per hospital discharge summary and inpatient provider notes:    This is a 72 years old male with past medical history of uncontrolled asthma who presented with shortness of breath with asthma exacerbation admitted for further treatment and evaluation.     Asthma exacerbation/bronchitis:    At admission requiring 2 L of oxygen    Leukocytosis 29     Continue with steroid prednisone 40 mg with possible plan for tapering    Resume home inhalers on Advair and albuterol    Patient was started on antibiotics with azithromycin which is reasonable to continue    X-ray show questionable pulmonary vascular congestion no history of CHF BNP normal expected this is secondary to bronchitis/underlying pneumonia cannot be ruled out at this time we will hold on any IV fluid, continue with antibiotics as above    If no improvement will consider CT scan    Patient denies any history of COPD COVID-negative    We will get a  VBG     Leukocytosis:    29,000 at admission expected secondary to possible bronchitis also steroid?  Although he did not get steroid until today recheck in a.m. if no improvement consider further imaging and broaden -spectrum antibiotics     Hypertension resume home medication on multiple atenolol felodipine and hydralazine     Hyponatremia 130 unclear etiology recheck in a.m. expected secondary to above with respiratory causes and possible SIADH/consider  further testing if no improvement.        ASSESSMENT      Enrollment  Primary Care Care Coordination Status: Declined    Discharge Assessment  How are you doing now that you are home?: Doing Okay, still coughing  How are your symptoms? (Red Flag symptoms escalate to triage hotline per guidelines): Unchanged  Do you feel your condition is stable enough to be safe at home until your provider visit?: Yes  Does the patient have their discharge instructions? : Yes  Does the patient have questions regarding their discharge instructions? : No  Were you started on any new medications or were there changes to any of your previous medications? : Yes  Does the patient have all of their medications?: Yes  Do you have questions regarding any of your medications? : No  Do you have all of your needed medical supplies or equipment (DME)?  (i.e. oxygen tank, CPAP, cane, etc.): Yes  Discharge follow-up appointment scheduled within 14 calendar days? : No  Is patient agreeable to assistance with scheduling? : Yes (CHW transferred patient to scheduling)      PLAN                                                          No future appointments.      For any urgent concerns, please contact our 24 hour nurse triage line: [phone] ([phone])         Joanna Ledesma MA

## 2022-04-29 NOTE — TELEPHONE ENCOUNTER
Reason for Call:  Same Day Appointment, Requested Provider:  N/A    PCP: Garrett Sotelo    Reason for visit: Pt needs to be worked in for post hospital f/u w/in 1 week    Duration of symptoms: N/A    Have you been treated for this in the past? No    Additional comments: N/A    Can we leave a detailed message on this number? YES    Phone number patient can be reached at: Home number on file [phone] (home)    Best Time: ANY    Call taken on 4/29/2022 at 12:20 PM by An Hogan

## 2022-05-04 ENCOUNTER — VIRTUAL VISIT (OUTPATIENT)
Dept: FAMILY MEDICINE | Facility: CLINIC | Age: 72
End: 2022-05-04
Payer: MEDICARE

## 2022-05-04 DIAGNOSIS — Z91.199 NO-SHOW FOR APPOINTMENT: ICD-10-CM

## 2022-05-04 DIAGNOSIS — J45.40 MODERATE PERSISTENT ASTHMA WITHOUT COMPLICATION: ICD-10-CM

## 2022-05-04 DIAGNOSIS — I10 BENIGN ESSENTIAL HYPERTENSION: ICD-10-CM

## 2022-05-04 DIAGNOSIS — J18.9 PNEUMONIA DUE TO INFECTIOUS ORGANISM, UNSPECIFIED LATERALITY, UNSPECIFIED PART OF LUNG: Primary | ICD-10-CM

## 2022-05-04 PROCEDURE — 99441 PR PHYSICIAN TELEPHONE EVALUATION 5-10 MIN: CPT | Mod: 95 | Performed by: INTERNAL MEDICINE

## 2022-05-04 RX ORDER — ATENOLOL 50 MG/1
50 TABLET ORAL DAILY
Qty: 90 TABLET | Refills: 3 | Status: SHIPPED | OUTPATIENT
Start: 2022-05-04 | End: 2022-06-21

## 2022-05-04 RX ORDER — ALBUTEROL SULFATE 90 UG/1
1-2 AEROSOL, METERED RESPIRATORY (INHALATION) EVERY 4 HOURS PRN
Qty: 18 G | Refills: 11 | Status: SHIPPED | OUTPATIENT
Start: 2022-05-04

## 2022-05-04 NOTE — PROGRESS NOTES
"Alexandro is a 72 year old who is being evaluated via a billable telephone visit.      What phone number would you like to be contacted at? [phone]  How would you like to obtain your AVS? Mail a copy    {PROVIDER CHARTING PREFERENCE:539601}    Jaiden Mc is a 72 year old who presents for the following health issues {ACCOMPANIED BY STATEMENT (Optional):706973}    HPI     Post Discharge Outreach 4/29/2022   Admission Date 4/24/2022   Reason for Admission EXACERBATION OF ASTHMA   Discharge Date 4/28/2022   How are you doing now that you are home? Doing Okay, still coughing   How are your symptoms? (Red Flag symptoms escalate to triage hotline per guidelines) Unchanged   Do you feel your condition is stable enough to be safe at home until your provider visit? Yes   Does the patient have their discharge instructions?  Yes   Does the patient have questions regarding their discharge instructions?  No   Were you started on any new medications or were there changes to any of your previous medications?  Yes   Does the patient have all of their medications? Yes   Do you have questions regarding any of your medications?  No   Do you have all of your needed medical supplies or equipment (DME)?  (i.e. oxygen tank, CPAP, cane, etc.) Yes   Discharge follow-up appointment scheduled within 14 calendar days?  No     Hospital Follow-up Visit:    Hospital/Nursing Home/IP Rehab Facility: [facility]  Date of Admission: 4-  Date of Discharge: 4-  Reason(s) for Admission: Acute Bronchitis, Asthma exacerbation      Was your hospitalization related to COVID-19? No   Problems taking medications regularly:  None  Medication changes since discharge: None  Problems adhering to non-medication therapy:  None    Summary of hospitalization:  {HOSPITAL DISCHARGE SUMMARY INFO:107956::\"[facility] discharge summary reviewed\"}  Diagnostic Tests/Treatments reviewed.  Follow up needed: {NONE " "DEFAULTED:206731::\"none\"}  Other Healthcare Providers Involved in Patient s Care:         {those currently involved after discharge:294859::\"None\"}  Update since discharge: {IMPROVED DEFAULT:264147::\"improved.\"} {TIP  Include information from family/caregivers, SNF, Care Coordination :689016}      Post Discharge Medication Reconciliation: {ACO Med Rec (Provider):973314}.  Plan of care communicated with {Communicate Plan to:009195::\"patient\"}     {Reference  Coding guidelines- Moderate Complexity F2F/Video within 7 - 14 days of discharge 3545506, High Complexity F2F/Video within 7 days [id number] or [id number] days [id number] :985193}         {additonal problems for provider to add (Optional):604686}    Review of Systems   {ROS COMP (Optional):330854}      Objective             Physical Exam   {GENERAL APPEARANCE:50::\"healthy\",\"alert\",\"no distress\"}  PSYCH: Alert and oriented times 3; coherent speech, normal   rate and volume, able to articulate logical thoughts, able   to abstract reason, no tangential thoughts, no hallucinations   or delusions  His affect is { :2771828::\"normal\"}  RESP: No cough, no audible wheezing, able to talk in full sentences  Remainder of exam unable to be completed due to telephone visits    {Diagnostic Test Results (Optional):182452}    {AMBULATORY ATTESTATION (Optional):456742}        Phone call duration: *** minutes  "

## 2022-05-04 NOTE — PROGRESS NOTES
Alexandro is a 72 year old who is being evaluated via a billable telephone visit.      What phone number would you like to be contacted at?   How would you like to obtain your AVS?     Assessment & Plan     Pneumonia due to infectious organism, unspecified laterality, unspecified part of lung  Completed antibiotics in the hospital apparently.  Check chest x-ray in 4 to 6-week interval to document resolution of consolidative opacities.  Symptoms seem to be improving  - XR Chest 2 Views; Future    Moderate persistent asthma without complication  Refilled rescue inhaler, complete prednisone taper, continue current bronchodilators  - albuterol (PROAIR HFA/PROVENTIL HFA/VENTOLIN HFA) 108 (90 Base) MCG/ACT inhaler; Inhale 1-2 puffs into the lungs every 4 hours as needed for shortness of breath / dyspnea or wheezing    Benign essential hypertension  Refilled atenolol  - atenolol (TENORMIN) 50 MG tablet; Take 1 tablet (50 mg) by mouth daily      28 minutes spent on the date of the encounter doing chart review, history and exam, documentation and further activities per the note           Return in about 6 weeks (around 6/15/2022) for Chest x-ray appointment to follow-up pneumonia.  He may do this at [facility]..  Patient instructed to return to clinic or contact us sooner if symptoms worsen or new symptoms develop.     Garrett Sotelo MD  [facility] BARBARA Mc is a 72 year old who presents for the following health issues     HPI     Post Discharge Outreach 4/29/2022   Admission Date 4/24/2022   Reason for Admission EXACERBATION OF ASTHMA   Discharge Date 4/28/2022   How are you doing now that you are home? Doing Okay, still coughing   How are your symptoms? (Red Flag symptoms escalate to triage hotline per guidelines) Unchanged   Do you feel your condition is stable enough to be safe at home until your provider visit? Yes   Does the patient have their discharge instructions?  Yes   Does  the patient have questions regarding their discharge instructions?  No   Were you started on any new medications or were there changes to any of your previous medications?  Yes   Does the patient have all of their medications? Yes   Do you have questions regarding any of your medications?  No   Do you have all of your needed medical supplies or equipment (DME)?  (i.e. oxygen tank, CPAP, cane, etc.) Yes   Discharge follow-up appointment scheduled within 14 calendar days?  No     Hospital Follow-up Visit:    Hospital/Nursing Home/ Rehab Facility: [facility]  Date of Admission: April 24, 2022  Date of Discharge: April 28, 2022  Reason(s) for Admission: Bronchitis with bronchospasm, hypoxic respiratory failure      Was your hospitalization related to COVID-19? No   Problems taking medications regularly:  None  Medication changes since discharge: None  Problems adhering to non-medication therapy:  None    Summary of hospitalization:  [facility] discharge summary reviewed, but incomplete at the time of this dictation  Diagnostic Tests/Treatments reviewed.  Follow up needed: none  Other Healthcare Providers Involved in Patient s Care:         None  Update since discharge: improved. Post Discharge Medication Reconciliation: discharge medications reconciled and changed, per note/orders.  Plan of care communicated with patient          Hospitalized for 4 days for severe exacerbation of asthma, bronchitis, hypoxic respiratory failure.  Completed antibiotics in the hospital apparently.  X-ray showed some consolidative opacities.  No fever since hospital discharge.  Decreasing oxygen requirement since hospital discharge.  Needs refill on rescue inhaler and atenolol.  He is taking 50 mg daily.  He is on a tapering dose of prednisone    Review of Systems         Objective             Physical Exam   healthy, alert and no distress  PSYCH: Alert and oriented times 3; coherent speech,  normal   rate and volume, able to articulate logical thoughts, able   to abstract reason, no tangential thoughts, no hallucinations   or delusions  His affect is normal  RESP: No cough, no audible wheezing, able to talk in full sentences  Remainder of exam unable to be completed due to telephone visits                Phone call duration: 9:47 minutes   This patient was a no show for this scheduled appointment.

## 2022-06-02 ENCOUNTER — ANCILLARY PROCEDURE (OUTPATIENT)
Dept: GENERAL RADIOLOGY | Facility: CLINIC | Age: 72
End: 2022-06-02
Attending: INTERNAL MEDICINE
Payer: MEDICARE

## 2022-06-02 DIAGNOSIS — J18.9 PNEUMONIA DUE TO INFECTIOUS ORGANISM, UNSPECIFIED LATERALITY, UNSPECIFIED PART OF LUNG: ICD-10-CM

## 2022-06-02 PROCEDURE — 71046 X-RAY EXAM CHEST 2 VIEWS: CPT | Mod: TC | Performed by: RADIOLOGY

## 2022-06-02 NOTE — LETTER
Marisel 3, 2022      Alexandro Lee  [address] [address]        Dear ,    The following letter pertains to your most recent diagnostic tests:     This x-ray result suggests that you are heading in the right direction, but the shadows in the bottom of the lungs have not cleared up completely.  If you are still feeling OK, I would recommend scheduling another imaging test in no earlier than 14 days to see if these shadows clear up completely with a little more time.  However, this time, I think we should get a CT scan of the chest to get a more detailed view of the shadows.  Please call [facility] at [phone] to schedule your CT scan some time after June 16th.         Resulted Orders   XR Chest 2 Views    Narrative    CHEST TWO VIEWS     6/2/2022 1:26 PM     HISTORY: Pneumonia due to infectious organism, unspecified laterality,  unspecified part of lung.    COMPARISON: Chest x-ray on 4/26/2022.      Impression    IMPRESSION: PA and lateral views of the chest were obtained.  Cardiomediastinal silhouette is within normal limits. Interval  improvement in the previously seen bilateral basilar predominant  patchy pulmonary opacities, as compared to 4/26/2022 exam. No  significant pleural effusion or pneumothorax.     BEVERLEY REYNOSO MD         SYSTEM ID:  [id number]       If you have any questions or concerns, please call the clinic at the number listed above.       Sincerely,      Garrett Sotelo MD        kimberly

## 2022-06-03 NOTE — RESULT ENCOUNTER NOTE
The following letter pertains to your most recent diagnostic tests:    This x-ray result suggests that you are heading in the right direction, but the shadows in the bottom of the lungs have not cleared up completely.  If you are still feeling OK, I would recommend scheduling another imaging test in no earlier than 14 days to see if these shadows clear up completely with a little more time.  However, this time, I think we should get a CT scan of the chest to get a more detailed view of the shadows.  Please call [facility] at [phone] to schedule your CT scan some time after June 16th.        Sincerely,    Dr. Sotelo

## 2022-06-17 ENCOUNTER — HOSPITAL ENCOUNTER (OUTPATIENT)
Dept: CT IMAGING | Facility: CLINIC | Age: 72
Discharge: HOME | End: 2022-06-17
Attending: INTERNAL MEDICINE | Admitting: INTERNAL MEDICINE
Payer: MEDICARE

## 2022-06-17 DIAGNOSIS — J18.9 PNEUMONIA DUE TO INFECTIOUS ORGANISM, UNSPECIFIED LATERALITY, UNSPECIFIED PART OF LUNG: ICD-10-CM

## 2022-06-17 PROCEDURE — 71250 CT THORAX DX C-: CPT | Mod: ME

## 2022-06-17 NOTE — RESULT ENCOUNTER NOTE
The following letter pertains to your most recent diagnostic tests:    Curiously, your CT shows persistent shadows in the bases of both lungs.   These shadows were originally thought to represent pneumonia for which you took antibiotics, but the persistence of the shadows on imaging studies for almost 2 months after you pneumonia diagnosis makes me question this.   Typically, pneumonia clears up on x-rays and CT scans after 4 to 6 weeks.   Therefore, I think we should consider alternative diagnoses.  For this, I think I would need the help of a lung specialist (pulmonologist).   I believe you have been seeing a Dr. De La Garza outside of [place] for your asthma, but most recently you saw Dr. Jones.  I think returning to that clinic would be advisable.  They can review the [place] CT with you and decide if additional serial imaging tests are needed or other evaluation.   Certainly, if your symptoms have worsened or new symptoms have developed, don't wait to see the Pulmonologist, inform us right away.  If you are having trouble securing an appointment with the outside pulmonary medicine doctors, please inform us and we can help set you up to see a [facility] lung specialist.          Sincerely,    Dr. Sotelo

## 2022-06-17 NOTE — LETTER
June 17, 2022      Alexandro Lee  [address] [address]        Dear ,    The following letter pertains to your most recent diagnostic tests:     Curiously, your CT shows persistent shadows in the bases of both lungs.   These shadows were originally thought to represent pneumonia for which you took antibiotics, but the persistence of the shadows on imaging studies for almost 2 months after you pneumonia diagnosis makes me question this.   Typically, pneumonia clears up on x-rays and CT scans after 4 to 6 weeks.   Therefore, I think we should consider alternative diagnoses.  For this, I think I would need the help of a lung specialist (pulmonologist).   I believe you have been seeing a Dr. De La Garza outside of [place] for your asthma, but most recently you saw Dr. Jones.  I think returning to that clinic would be advisable.  They can review the [place] CT with you and decide if additional serial imaging tests are needed or other evaluation.   Certainly, if your symptoms have worsened or new symptoms have developed, don't wait to see the Pulmonologist, inform us right away.  If you are having trouble securing an appointment with the outside pulmonary medicine doctors, please inform us and we can help set you up to see a [facility] lung specialist.         Resulted Orders   CT Chest w/o Contrast    Narrative    CT CHEST WITHOUT CONTRAST  6/17/2022 1:36 PM    HISTORY:  Pneumonia, effusion or abscess suspected, x-ray done.  Pneumonia due to infectious organism, unspecified laterality,  unspecified part of lung.    TECHNIQUE:  Scans obtained from the apices through the diaphragm  without IV contrast. Radiation dose for this scan was reduced using  automated exposure control, adjustment of the mA and/or kV according  to patient size, or iterative reconstruction technique.    COMPARISON:  Chest x-ray on 6/2/2022 and chest CT on 12/19/2016.    FINDINGS:   Chest/mediastinum: No  cardiomegaly or significant pericardial  effusion. Extensive atherosclerotic vascular calcification of the  coronary arteries. Multiple prominent mediastinal and hilar lymph  nodes including 1.1 cm short axis precarinal node, only slightly  increased in size as compared to 12/19/2016 exam where it measured 1.1  cm and 1.1 cm short axis subcarinal node (series 4 image 79) increased  in size as compared to 12/19/2016 exam where it measured 0.8 cm, these  are likely reactive.    Lung/pleura: No pleural effusion or pneumothorax. Bilateral, right  middle and lower lobes predominant patchy groundglass and nodular  pulmonary opacities, likely infectious.    Upper abdomen: Limited evaluation of the upper abdomen due to lack of  coverage and contrast. Few mildly prominent upper abdominal lymph  nodes including 1.1 cm short axis left para-aortic lymph node (series  4 image 195).    Bones and soft tissue: Multilevel degenerative changes of the spine.  No suspicious osseous lesion.      Impression    IMPRESSION:    1. Bilateral right middle and lower lobes predominant patchy  groundglass and nodular pulmonary opacities, likely infectious.  Recommend follow-up exam after symptoms improve and dedicated chest CT  to ensure resolution.  2. Multiple prominent mediastinal and hilar lymph nodes,  indeterminate, could be reactive.  3. Extensive atherosclerotic vascular calcification of the coronary  arteries.  4. Multiple mildly prominent upper abdominal lymph nodes, of  indeterminate etiology.     BEVERLEY REYNOSO MD         SYSTEM ID:  [id number]       If you have any questions or concerns, please call the clinic at the number listed above.       Sincerely,      Garrett Sotelo MD        kimberly

## 2022-06-21 ENCOUNTER — TELEPHONE (OUTPATIENT)
Dept: FAMILY MEDICINE | Facility: CLINIC | Age: 72
End: 2022-06-21
Payer: MEDICARE

## 2022-06-21 DIAGNOSIS — I10 BENIGN ESSENTIAL HYPERTENSION: ICD-10-CM

## 2022-06-21 RX ORDER — ATENOLOL 100 MG/1
50 TABLET ORAL 2 TIMES DAILY
Qty: 90 TABLET | Refills: 3 | Status: SHIPPED | OUTPATIENT
Start: 2022-06-21 | End: 2023-05-30

## 2022-06-21 NOTE — TELEPHONE ENCOUNTER
Patient Contact     Attempt #: 1     Was call answered? Yes, writer reviewed provider message below. Patient expressed verbal understanding.     Result letter already sent to patient.      Enrique Gabriel RN  [facility]          Garrett Sotelo MD   6/17/2022  2:55 PM CDT         The following letter pertains to your most recent diagnostic tests:     Curiously, your CT shows persistent shadows in the bases of both lungs.   These shadows were originally thought to represent pneumonia for which you took antibiotics, but the persistence of the shadows on imaging studies for almost 2 months after you pneumonia diagnosis makes me question this.   Typically, pneumonia clears up on x-rays and CT scans after 4 to 6 weeks.   Therefore, I think we should consider alternative diagnoses.  For this, I think I would need the help of a lung specialist (pulmonologist).   I believe you have been seeing a Dr. De La Garza outside of [place] for your asthma, but most recently you saw Dr. Jones.  I think returning to that clinic would be advisable.  They can review the [place] CT with you and decide if additional serial imaging tests are needed or other evaluation.   Certainly, if your symptoms have worsened or new symptoms have developed, don't wait to see the Pulmonologist, inform us right away.  If you are having trouble securing an appointment with the outside pulmonary medicine doctors, please inform us and we can help set you up to see a [facility] lung specialist.             Sincerely,

## 2022-06-21 NOTE — TELEPHONE ENCOUNTER
Patient called regarding Atenolol refill RX.  Patient states he was taking Atenolol 50 MG in the AM and 50 MG in the PM for a daily total of 100 MG.  He was splitting a 100 MG in half.    New Prescription is for 50 MG daily.  States this is incorrect and he should be getting the 100 MG tablets and splitting in half to take 50 MG in AM and 50 MG in PM.  Requesting new RX sent to pharmacy.    Vania Andrews RN

## 2022-06-22 ENCOUNTER — TELEPHONE (OUTPATIENT)
Dept: FAMILY MEDICINE | Facility: CLINIC | Age: 72
End: 2022-06-22

## 2022-06-22 NOTE — TELEPHONE ENCOUNTER
Pt called the clinic asking how to get his CT scans over to [facility]. Provided pt with [facility] number to call to get his CT record. Pt agrees to plan.

## 2022-08-12 DIAGNOSIS — I10 BENIGN ESSENTIAL HYPERTENSION: ICD-10-CM

## 2022-08-15 RX ORDER — FELODIPINE 10 MG/1
10 TABLET, EXTENDED RELEASE ORAL DAILY
Qty: 90 TABLET | Refills: 3 | Status: SHIPPED | OUTPATIENT
Start: 2022-08-15 | End: 2023-03-27

## 2022-08-15 NOTE — TELEPHONE ENCOUNTER
Routing refill request to provider for review/approval because:          Aleida Padilla, RN BSN MSN  [facility]

## 2022-09-20 NOTE — TELEPHONE ENCOUNTER
Okmarlene for letter  K to send omeprazole 40 mg daily #30. No R   Reason for Call:  [facility] Req Rx Clarification on prescription    Detailed comments: [facility] Calling Requesting Clarification on directions for  Rx Advair      Phone Number [facility] can be reached at: Other phone number:  [phone] Reference #[id number]    Best Time: today    Can we leave a detailed message on this number? Not Applicable    Call taken on 10/30/2017 at 4:13 PM by Winnie Cooper

## 2022-10-25 ENCOUNTER — NURSE TRIAGE (OUTPATIENT)
Dept: FAMILY MEDICINE | Facility: CLINIC | Age: 72
End: 2022-10-25

## 2022-10-25 DIAGNOSIS — I10 BENIGN ESSENTIAL HYPERTENSION: ICD-10-CM

## 2022-10-25 NOTE — TELEPHONE ENCOUNTER
"Pt called with concern of having a cold for the past 2 and a half weeks. He has sinus drainage, cough, low grade temp yesterday but today is 98.7. Did triage symptoms and offered [facility] clinic but pt declined. Offered telephone visit tomorrow as there is no availability today. Pt stated understanding and agreement to schedule with provider tomorrow. He had no further questions at this time.     Reason for Disposition    Sinus congestion (pressure, fullness) present > 10 days    Additional Information    Negative: SEVERE difficulty breathing (e.g., struggling for each breath, speaks in single words)    Negative: Very weak (can't stand)    Negative: Sounds like a life-threatening emergency to the triager    Negative: Difficulty breathing and not from stuffy nose (e.g., not relieved by cleaning out the nose)    Negative: Runny nose is caused by pollen or other allergies    Negative: Cough is main symptom    Negative: Sore throat is main symptom    Negative: Patient sounds very sick or weak to the triager    Negative: Fever > 103 F (39.4 C)    Negative: Fever > 101 F (38.3 C) and over 60 years of age    Negative: Fever > 100.0 F (37.8 C) and has diabetes mellitus or a weak immune system (e.g., HIV positive, cancer chemotherapy, organ transplant, splenectomy, chronic steroids)    Negative: Fever > 100.0 F (37.8 C) and bedridden (e.g., nursing home patient, stroke, chronic illness, recovering from surgery)    Negative: Fever present > 3 days (72 hours)    Negative: Fever returns after gone for over 24 hours and symptoms worse or not improved    Negative: Sinus pain (not just congestion) and fever    Negative: Earache    Answer Assessment - Initial Assessment Questions  1. ONSET: \"When did the nasal discharge start?\"         Started about 2 and half weeks ago.     2. AMOUNT: \"How much discharge is there?\"         More so coughing drainage out. Light green color. Not very thick.     3. COUGH: \"Do you have a cough?\" If yes, " "ask: \"Describe the color of your sputum\" (clear, white, yellow, green)        Yes, light green sputum    4. RESPIRATORY DISTRESS: \"Describe your breathing.\"         Short of breath with coughing, he does have cough induced asthma. O2 does drop a little after coughing but comes right back up. 94% this morning.     5. FEVER: \"Do you have a fever?\" If Yes, ask: \"What is your temperature, how was it measured, and when did it start?\"        No fever right now 98.7, did have low grade temp yesterday .     6. SEVERITY: \"Overall, how bad are you feeling right now?\" (e.g., doesn't interfere with normal activities, staying home from school/work, staying in bed)         Overall staying home, thinks he got this from daughter in law and son.     7. OTHER SYMPTOMS: \"Do you have any other symptoms?\" (e.g., sore throat, earache, wheezing, vomiting)        None     8. PREGNANCY: \"Is there any chance you are pregnant?\" \"When was your last menstrual period?\"        NA    Protocols used: COMMON COLD-A-OH    SEE IN OFFICE TODAY OR TOMORROW:   * You need to be examined. Let me give you an appointment.  * IF NO AVAILABLE OFFICE APPOINTMENTS: You need to be seen in an Urgent Care Center. Go there today. A nearby Urgent Care Center is often a good source of care.      CALL BACK IF:  * Difficulty breathing occurs  * Fever lasts more than 3 days   * Nasal discharge lasts more than 10 days   * Cough lasts more than 3 weeks   * You become worse        Patient/Caregiver understands and will follow care advice? Yes, plans to follow advice     Myrtle CARUSO RN  [facility]   "

## 2022-10-26 ENCOUNTER — VIRTUAL VISIT (OUTPATIENT)
Dept: FAMILY MEDICINE | Facility: CLINIC | Age: 72
End: 2022-10-26
Payer: MEDICARE

## 2022-10-26 ENCOUNTER — TELEPHONE (OUTPATIENT)
Dept: FAMILY MEDICINE | Facility: CLINIC | Age: 72
End: 2022-10-26

## 2022-10-26 DIAGNOSIS — R05.9 COUGH, UNSPECIFIED TYPE: ICD-10-CM

## 2022-10-26 DIAGNOSIS — R05.9 COUGH, UNSPECIFIED TYPE: Primary | ICD-10-CM

## 2022-10-26 PROCEDURE — 99213 OFFICE O/P EST LOW 20 MIN: CPT | Mod: 95 | Performed by: PHYSICIAN ASSISTANT

## 2022-10-26 RX ORDER — PREDNISONE 20 MG/1
40 TABLET ORAL DAILY
Qty: 10 TABLET | Refills: 0 | Status: SHIPPED | OUTPATIENT
Start: 2022-10-26 | End: 2023-07-13

## 2022-10-26 RX ORDER — HYDRALAZINE HYDROCHLORIDE 50 MG/1
TABLET, FILM COATED ORAL
Qty: 180 TABLET | Refills: 1 | Status: SHIPPED | OUTPATIENT
Start: 2022-10-26 | End: 2023-03-28

## 2022-10-26 RX ORDER — PREDNISONE 20 MG/1
40 TABLET ORAL DAILY
Qty: 10 TABLET | Refills: 0 | Status: SHIPPED | OUTPATIENT
Start: 2022-10-26 | End: 2022-10-26

## 2022-10-26 NOTE — PROGRESS NOTES
Alexandro is a 72 year old who is being evaluated via a billable telephone visit.      What phone number would you like to be contacted at?   Home Phone [phone]   Work Phone Not on file.   Mobile [phone]     How would you like to obtain your [facility]? [facility]    Assessment and Plan:     (R05.9) Cough, unspecified type  (primary encounter diagnosis)  Comment: has had symptoms about 2 weeks, no fever, temp 100 last night, 98 this am, no sob aside from when he coughs, no chest pain or leg swelling, hasn't tested for covid, wants abx, explained likely viral but w/duration should get CXR and r/o Covid, will also try prednisone in case RAD component  Plan: Symptomatic; Unknown COVID-19 Virus         (Coronavirus) by PCR, XR Chest 2 Views,         predniSONE (DELTASONE) 20 MG tablet  Continue symptomatic cares  Discussed when to be seen promptly       Bertha Gil PA-C        Subjective   Alexandro is a 72 year old presenting for the following health issues:  Sinus Problem (drainage) and Cough (April hx  of pneumonia)      HPI     Alexandro is here for URI symptoms x 1;0 days  He had temp of 100 last night  This am his temp was 98.1   He feels short of breath with his cough but not in between coughing  He hasn't tested for covid   He feels like most of the congestion is in his face  He feels like his vocal cord dysfunction worsens with cough  He denies chest pain, leg swelling, shortness of breath aside from when coughing     Review of Systems   See above      Objective           Vitals:  No vitals were obtained today due to virtual visit.    Physical Exam      No exam, phone visit         Phone call duration: 9 minutes

## 2022-10-26 NOTE — TELEPHONE ENCOUNTER
Patient called in the pharmacy the prednisone was sent to has the Internet down and is needing it sent to different [facility]'s    Resent to new pharmacy    Yann Nunez RN

## 2022-10-26 NOTE — TELEPHONE ENCOUNTER
Routing refill request to provider for review/approval because:  BP Readings from Last 3 Encounters:   04/28/22 (!) 142/76   08/17/21 113/73   01/02/20 138/78         Yann Nunez RN  [facility] Triage Nurse

## 2022-11-30 DIAGNOSIS — J45.40 MODERATE PERSISTENT ASTHMA WITHOUT COMPLICATION: ICD-10-CM

## 2022-12-02 RX ORDER — MONTELUKAST SODIUM 10 MG/1
TABLET ORAL
Qty: 90 TABLET | Refills: 3 | Status: SHIPPED | OUTPATIENT
Start: 2022-12-02

## 2022-12-02 NOTE — TELEPHONE ENCOUNTER
Routing refill request to provider for review/approval because:  Fails for last ACT out of range   Teresita Roque RN

## 2023-03-25 DIAGNOSIS — I10 BENIGN ESSENTIAL HYPERTENSION: ICD-10-CM

## 2023-03-27 RX ORDER — FELODIPINE 10 MG/1
TABLET, EXTENDED RELEASE ORAL
Qty: 90 TABLET | Refills: 3 | Status: SHIPPED | OUTPATIENT
Start: 2023-03-27 | End: 2023-07-13

## 2023-03-28 DIAGNOSIS — I10 BENIGN ESSENTIAL HYPERTENSION: ICD-10-CM

## 2023-03-28 RX ORDER — HYDRALAZINE HYDROCHLORIDE 50 MG/1
TABLET, FILM COATED ORAL
Qty: 180 TABLET | Refills: 1 | Status: SHIPPED | OUTPATIENT
Start: 2023-03-28 | End: 2023-07-13

## 2023-04-03 ENCOUNTER — TRANSFERRED RECORDS (OUTPATIENT)
Dept: HEALTH INFORMATION MANAGEMENT | Facility: CLINIC | Age: 73
End: 2023-04-03
Payer: MEDICARE

## 2023-04-03 LAB
ALT SERPL-CCNC: 26 IU/L (ref 0–44)
AST SERPL-CCNC: 30 IU/L (ref 0–40)
CREATININE (EXTERNAL): 0.6 MG/DL (ref 0.76–1.27)
GFR ESTIMATED (EXTERNAL): 103 ML/MIN/1.73
INR (EXTERNAL): 1 (ref 0.9–1.2)

## 2023-04-12 ENCOUNTER — TRANSFERRED RECORDS (OUTPATIENT)
Dept: HEALTH INFORMATION MANAGEMENT | Facility: CLINIC | Age: 73
End: 2023-04-12
Payer: MEDICARE

## 2023-05-29 DIAGNOSIS — I10 BENIGN ESSENTIAL HYPERTENSION: ICD-10-CM

## 2023-05-30 RX ORDER — ATENOLOL 100 MG/1
TABLET ORAL
Qty: 90 TABLET | Refills: 3 | Status: SHIPPED | OUTPATIENT
Start: 2023-05-30 | End: 2023-07-13

## 2023-07-12 ASSESSMENT — ASTHMA QUESTIONNAIRES: ACT_TOTALSCORE: 17

## 2023-07-13 ENCOUNTER — OFFICE VISIT (OUTPATIENT)
Dept: FAMILY MEDICINE | Facility: CLINIC | Age: 73
End: 2023-07-13
Payer: MEDICARE

## 2023-07-13 VITALS
BODY MASS INDEX: 25.12 KG/M2 | DIASTOLIC BLOOD PRESSURE: 71 MMHG | RESPIRATION RATE: 20 BRPM | TEMPERATURE: 97.4 F | SYSTOLIC BLOOD PRESSURE: 127 MMHG | OXYGEN SATURATION: 100 % | HEART RATE: 77 BPM | WEIGHT: 206.4 LBS

## 2023-07-13 DIAGNOSIS — J38.3 VOCAL CORD DYSFUNCTION: ICD-10-CM

## 2023-07-13 DIAGNOSIS — I10 BENIGN ESSENTIAL HYPERTENSION: ICD-10-CM

## 2023-07-13 DIAGNOSIS — Z13.220 LIPID SCREENING: ICD-10-CM

## 2023-07-13 DIAGNOSIS — K74.69 OTHER CIRRHOSIS OF LIVER (H): ICD-10-CM

## 2023-07-13 DIAGNOSIS — R73.01 IFG (IMPAIRED FASTING GLUCOSE): ICD-10-CM

## 2023-07-13 DIAGNOSIS — Z12.11 SCREEN FOR COLON CANCER: ICD-10-CM

## 2023-07-13 DIAGNOSIS — Z01.818 PREOP GENERAL PHYSICAL EXAM: Primary | ICD-10-CM

## 2023-07-13 DIAGNOSIS — J45.40 MODERATE PERSISTENT ASTHMA WITHOUT COMPLICATION: ICD-10-CM

## 2023-07-13 DIAGNOSIS — I10 ESSENTIAL HYPERTENSION: ICD-10-CM

## 2023-07-13 LAB
HBA1C MFR BLD: 5.1 % (ref 0–5.6)
HGB BLD-MCNC: 16.9 G/DL (ref 13.3–17.7)

## 2023-07-13 PROCEDURE — 85018 HEMOGLOBIN: CPT | Performed by: INTERNAL MEDICINE

## 2023-07-13 PROCEDURE — 82565 ASSAY OF CREATININE: CPT | Performed by: INTERNAL MEDICINE

## 2023-07-13 PROCEDURE — 80061 LIPID PANEL: CPT | Performed by: INTERNAL MEDICINE

## 2023-07-13 PROCEDURE — 36415 COLL VENOUS BLD VENIPUNCTURE: CPT | Performed by: INTERNAL MEDICINE

## 2023-07-13 PROCEDURE — 83036 HEMOGLOBIN GLYCOSYLATED A1C: CPT | Performed by: INTERNAL MEDICINE

## 2023-07-13 PROCEDURE — 99214 OFFICE O/P EST MOD 30 MIN: CPT | Performed by: INTERNAL MEDICINE

## 2023-07-13 RX ORDER — ATENOLOL 100 MG/1
TABLET ORAL
Qty: 90 TABLET | Refills: 3 | Status: SHIPPED | OUTPATIENT
Start: 2023-07-13 | End: 2023-08-03

## 2023-07-13 RX ORDER — FELODIPINE 10 MG/1
10 TABLET, EXTENDED RELEASE ORAL DAILY
Qty: 90 TABLET | Refills: 3 | Status: SHIPPED | OUTPATIENT
Start: 2023-07-13 | End: 2023-08-03

## 2023-07-13 RX ORDER — HYDRALAZINE HYDROCHLORIDE 50 MG/1
50 TABLET, FILM COATED ORAL 2 TIMES DAILY
Qty: 180 TABLET | Refills: 3 | Status: SHIPPED | OUTPATIENT
Start: 2023-07-13 | End: 2023-08-03

## 2023-07-13 RX ORDER — NICOTINE POLACRILEX 4 MG/1
20 GUM, CHEWING ORAL DAILY
COMMUNITY
Start: 2023-07-13

## 2023-07-13 RX ORDER — AMITRIPTYLINE HYDROCHLORIDE 10 MG/1
TABLET ORAL
Qty: 90 TABLET | Refills: 11 | Status: SHIPPED | OUTPATIENT
Start: 2023-07-13 | End: 2024-03-28

## 2023-07-13 ASSESSMENT — PAIN SCALES - GENERAL: PAINLEVEL: NO PAIN (0)

## 2023-07-13 NOTE — PROGRESS NOTES
[address] [facility], [address]  [facility] [address]  Phone: [phone]  Primary Provider: Christiane Steve  Pre-op Performing Provider: CHRISTIANE STEVE      PREOPERATIVE EVALUATION:  Today's date: 7/13/2023    Alexandro Lee is a 73 year old male who presents for a preoperative evaluation.       No data to display              Surgical Information:  Surgery/Procedure: Colonoscopy  Surgery Location: [facility]  Surgeon: Krzysztof Patino MD  Surgery Date: 7/20/2023  Time of Surgery: 8:00 AM  Where patient plans to recover: At home with family  Fax number for surgical facility: Note does not need to be faxed, will be available electronically in Epic.    Assessment & Plan     The proposed surgical procedure is considered LOW risk.    Preop general physical exam  Suitable candidate for colonoscopy    Screen for colon cancer      Vocal cord dysfunction  Try elavil for vocal cord problems; thorough discussion of potential risks and side effects ; telephone follow up in 3 -4 weeks to assess therapy   - amitriptyline (ELAVIL) 10 MG tablet; One tablet at bedtime for one week, then increase to two tablets at bedtime for one week, then increase to 3 tablets at bedtime.    Moderate persistent asthma without complication  Stable     Essential hypertension  Well controlled     Other cirrhosis of liver (H)  He tells me his hepatologist is very optimistic about his prognosis on this front       Reminded him to get shingrix  Recommend covid booster he declined  He is allergic to tetanus shots  He declines PSA  He requests screening lipids         - No identified additional risk factors other than previously addressed    Antiplatelet or Anticoagulation Medication Instructions:   - aspirin: Discontinue aspirin 7-10 days prior to procedure to reduce bleeding risk. It should be resumed postoperatively.     Additional Medication Instructions:  Patient is to take all scheduled  medications on the day of surgery EXCEPT for modifications listed below:   - Beta Blockers: Continue taking on the day of surgery.   - Calcium Channel Blockers: May be continued on the day of surgery.   - fiber, laxatives: HOLD day of surgery   - cetirizine and first generation antihistamines: HOLD on day of surgery.   - leukotriene Inhibitors: Continue without modifcation.   - LABA, inhaled corticosteroid, long-acting anticholinergics: Continue without modification.    RECOMMENDATION:  APPROVAL GIVEN to proceed with proposed procedure, without further diagnostic evaluation.      21 minutes spent by me on the date of the encounter doing chart review, history and exam, documentation and further activities per the note      Subjective       HPI related to upcoming procedure: has vocal chord dysfunction so needs colonoscopy in the hospital.  Severe symptoms, so he wants to try Elavil.  This patient reports being able to perform 4 METS of physical activity without chest pain or dyspnea.         7/12/2023     6:11 PM   Preop Questions   1. Have you ever had a heart attack or stroke? No   2. Have you ever had surgery on your heart or blood vessels, such as a stent placement, a coronary artery bypass, or surgery on an artery in your head, neck, heart, or legs? No   3. Do you have chest pain with activity? No   4. Do you have a history of  heart failure? No   5. Do you currently have a cold, bronchitis or symptoms of other infection? No   6. Do you have a cough, shortness of breath, or wheezing? YES - chronic    7. Do you or anyone in your family have previous history of blood clots? No   8. Do you or does anyone in your family have a serious bleeding problem such as prolonged bleeding following surgeries or cuts? No   9. Have you ever had problems with anemia or been told to take iron pills? No   10. Have you had any abnormal blood loss such as black, tarry or bloody stools? No   11. Have you ever had a blood transfusion?  No   12. Are you willing to have a blood transfusion if it is medically needed before, during, or after your surgery? Yes, but only if it is a matter of life or death    13. Have you or any of your relatives ever had problems with anesthesia? No   14. Do you have sleep apnea, excessive snoring or daytime drowsiness? No   15. Do you have any artifical heart valves or other implanted medical devices like a pacemaker, defibrillator, or continuous glucose monitor? No   16. Do you have artificial joints? YES -    17. Are you allergic to latex? No       Review of Systems  Constitutional, neuro, ENT, endocrine, pulmonary, cardiac, gastrointestinal, genitourinary, musculoskeletal, integument and psychiatric systems are negative, except as otherwise noted.    Patient Active Problem List    Diagnosis Date Noted     Acute bronchitis, unspecified organism 04/24/2022     Priority: Medium     Exacerbation of asthma, unspecified asthma severity, unspecified whether persistent 04/24/2022     Priority: Medium     Hyperlipidemia LDL goal <100 12/31/2018     Priority: Medium     Other cirrhosis of liver (H) 12/31/2018     Priority: Medium     Chronic cough 12/31/2018     Priority: Medium     Moderate persistent asthma 04/14/2017     Priority: Medium     Left hip pain 03/17/2016     Priority: Medium     Advanced directives, counseling/discussion 01/17/2012     Priority: Medium     Patient states has Advance Directive and will bring in a copy to clinic.       Nonspecific abnormal results of liver function study 12/09/2010     Priority: Medium     CARDIOVASCULAR SCREENING; LDL GOAL LESS THAN 160 10/31/2010     Priority: Medium     Essential hypertension 04/28/2003     Priority: Medium     Problem list name updated by automated process. Provider to review        Past Medical History:   Diagnosis Date     Arthritis     osteoarthritis     Hyperlipidemia      Moderate persistent asthma 4/14/2017     Unspecified essential hypertension     BPs  run lower at home than in the office.     Past Surgical History:   Procedure Laterality Date     ARTHROPLASTY HIP ANTERIOR Left 3/17/2016    Procedure: ARTHROPLASTY HIP ANTERIOR;  Surgeon: Hilario Hicks MD;  Location: [facility] OR     BIOPSY      prostate     ORTHOPEDIC SURGERY  1980's    ACL and PCL repairs left knee     ZZC NONSPECIFIC PROCEDURE      Left ACL and PCL repair     Current Outpatient Medications   Medication Sig Dispense Refill     albuterol (PROAIR HFA/PROVENTIL HFA/VENTOLIN HFA) 108 (90 Base) MCG/ACT inhaler Inhale 1-2 puffs into the lungs every 4 hours as needed for shortness of breath / dyspnea or wheezing 18 g 11     aspirin 81 MG tablet Take 81 mg by mouth daily       atenolol (TENORMIN) 100 MG tablet TAKE 1/2 TABLET TWO TIMES ADAY 90 tablet 3     cetirizine (ZYRTEC) 10 MG tablet Take 1 tablet (10 mg) by mouth every evening 90 tablet 11     cholecalciferol (VITAMIN  -D) 1000 UNITS capsule Take 2 capsules by mouth daily        felodipine ER (PLENDIL) 10 MG 24 hr tablet TAKE 1 TABLET DAILY 90 tablet 3     Fluticasone-Umeclidin-Vilanterol (TRELEGY ELLIPTA) 100-62.5-25 MCG/INH oral inhaler Inhale 1 puff into the lungs daily       hydrALAZINE (APRESOLINE) 50 MG tablet TAKE 1 TABLET TWICE A  tablet 1     montelukast (SINGULAIR) 10 MG tablet TAKE 1 TABLET AT BEDTIME 90 tablet 3     Multiple Vitamins-Minerals (CENTRUM SILVER) per tablet Take 1 tablet by mouth daily       OMEPRAZOLE PO Take 40 mg by mouth daily       predniSONE (DELTASONE) 20 MG tablet Take 2 tablets (40 mg) by mouth daily 10 tablet 0     DM-APAP-CPM (CORICIDIN HBP FLU PO)  (Patient not taking: Reported on 7/13/2023)       ibuprofen (ADVIL/MOTRIN) 100 MG tablet Take 100 mg by mouth every 4 hours as needed       ipratropium - albuterol 0.5 mg/2.5 mg/3 mL (DUONEB) 0.5-2.5 (3) MG/3ML neb solution Take 1 vial (3 mLs) by nebulization 4 times daily 90 mL 0     magnesium 250 MG tablet Take 1 tablet by mouth daily         Allergies    Allergen Reactions     Amlodipine Cough     Amoxicillin Hives     hives     Lisinopril Cough     Cough       Losartan Cough     Tetanus Toxoid, Adsorbed Other (See Comments)     DPT as a child caused severe swelling of arm     Tetanus Toxoids      DPT as a child caused severe swelling of arm        Social History     Tobacco Use     Smoking status: Never     Smokeless tobacco: Never   Substance Use Topics     Alcohol use: Yes     Alcohol/week: 0.0 standard drinks of alcohol     Comment: 2 glasses wine day     Family History   Problem Relation Age of Onset     Diabetes Father      Heart Disease Father      History   Drug Use No         Objective     /71 (BP Location: Right arm, Patient Position: Sitting, Cuff Size: Adult Large)   Pulse 77   Temp 97.4  F (36.3  C) (Temporal)   Resp 20   Wt 93.6 kg (206 lb 6.4 oz)   SpO2 100%   BMI 25.12 kg/m      Physical Exam    GENERAL APPEARANCE: healthy, alert and no distress     EYES: EOMI,  PERRL     HENT: ear canals and TM's normal and nose and mouth without ulcers or lesions     NECK: no adenopathy, no asymmetry, masses, or scars and thyroid normal to palpation     RESP: lungs clear to auscultation - no rales, rhonchi or wheezes     CV: regular rates and rhythm, normal S1 S2, no S3 or S4 and no murmur, click or rub     ABDOMEN:  soft, nontender, no HSM or masses and bowel sounds normal     MS: extremities normal- no gross deformities noted, no evidence of inflammation in joints, FROM in all extremities.     SKIN: no suspicious lesions or rashes     NEURO: Normal strength and tone, sensory exam grossly normal, mentation intact and speech normal     PSYCH: mentation appears normal. and affect normal/bright     LYMPHATICS: No cervical adenopathy    Recent Labs   Lab Test 04/03/23  1417 04/28/22  1105 04/27/22  0955 04/26/22  1001 04/25/22  0549 04/24/22  1716 08/17/21  1824   HGB  --   --  14.3 13.8   < > 15.5  --    PLT  --   --  349 376   < > 423  --    INR 1.0   --   --   --   --   --  1.09   NA  --   --   --  135  --  130*  --    POTASSIUM  --  3.3*  --  3.7  --  4.4  --    CR  --   --   --  0.36*  --  0.37*  --     < > = values in this interval not displayed.        Diagnostics:  No labs were ordered during this visit.   No EKG required for low risk surgery (cataract, skin procedure, breast biopsy, etc).    Revised Cardiac Risk Index (RCRI):  The patient has the following serious cardiovascular risks for perioperative complications:   - No serious cardiac risks = 0 points     RCRI Interpretation: 0 points: Class I (very low risk - 0.4% complication rate)           Signed Electronically by: Garrett Sotelo MD  Copy of this evaluation report is provided to requesting physician.

## 2023-07-13 NOTE — LETTER
"July 14, 2023      Alexandro Lee  [address] [facility] [address]        Dear ,    We are writing to inform you of your test results.    -Tests of kidney function are stable for you (Creatinine, GFR).       -Your total cholesterol is 207 which is above your goal of total cholesterol less than 200.     -Your triglycerides are 68 which are at your goal of triglycerides less than 150.     -Your HDL or \"good cholesterol\" is 85 which is at your goal of HDL cholesterol greater than 40.     -Your LDL cholesterol or \"bad cholesterol\" is 108 which is below your goal of LDL cholesterol less than <70.  Your LDL goal is based on your risk factors for artery disease.     -The hemoglobin is normal.  No anemia.       -Your hemoglobin A1c test which is a blood sugar test returned normal.  No diabetes.             Bottom line:  The cholesterol is not that high, but the risk for blood vessel disease is high enough to warrant consideration for statin therapy.     Based on your current cholesterol levels and your other risk factors, I estimate a 19.5% statistical chance for you developing significant blood vessel blockage over the next 10 years.  We call this statistic a 10-year atherosclerotic coronary vascular disease (ASCVD) risk score.     Anyone with an ASCVD risk score greater than 7.5% is considered to have elevated risk for significant blood vessel blockage.     Since your ASCVD risk score is higher than 7.5%, we want to make sure we are doing everything possible to reduce your chances of developing significant blockage.       We believe that statin medications such as atorvastatin (Lipitor) have protective effects in the blood vessels that extend beyond their ability to simply lower blood cholesterol numbers.  We know statin medications prevent the accumulation of blockage in blood vessels and we also know that statin medication stabilize and shrink existing blood vessel blockages.     Current expert " guidelines recommend starting statin medication therapy for individuals with ASCVD risk scores greater than 7.5%.  Therefore, I would recommend that you trying taking atorvastatin (Lipitor).     You should be informed that a very small minority of people who take atorvastatin can develop muscle pain and weakness as a side effect from that drug.  If you experience those side effects, then stop the drug and contact us.       If you start the medication, you should have a follow up fasting cholesterol panel after you have been taking the medication for 1-2 months.  You can schedule a lab appointment for that purpose.       Please contact us if you would like to get that medication started so we can send a prescritpion and arrange for appropriate follow up.       I would certainly understand if you have questions about this.  You are certainly more than welcome to schedule a virtual visit (telephone or video visit) with me to discuss this matter further and get all your questions answered if you have them.       Taking statins certainly does not get you off the hook for living a healthy lifestyle.  Even if you decide to start the atorvastatin (Lipitor), it would still be important to eat a diet low in saturated fats and high in fiber to control cholesterol and blood vessel disease risk.  Increase servings of fruits and vegetables!  Physical activity is also a very important way to modulate blood vessel disease risk.  A  good target to shoot for is 150 minutes (two and one-half hours) of moderate intensity physical activity per week.         Follow up:  We should check your lipids in about one month if you decide to start the atorvastatin (Lipitor).  Otherwise we will check in on how the amitriptyline is working in 3-4 weeks.         Sincerely,     Dr. Sotelo                       The 10-year ASCVD risk score (Gena ROBERTS, et al., 2019) is: 19.5%     Values used to calculate the score:       Age: 73 years       Sex: Male        Is Non- : No       Diabetic: No       Tobacco smoker: No       Systolic Blood Pressure: 127 mmHg       Is BP treated: Yes       HDL Cholesterol: 85 mg/dL       Total Cholesterol: 207 mg/dL     Resulted Orders   Creatinine   Result Value Ref Range    Creatinine 0.62 (L) 0.67 - 1.17 mg/dL    GFR Estimate >90 >60 mL/min/1.73m2   Hemoglobin   Result Value Ref Range    Hemoglobin 16.9 13.3 - 17.7 g/dL   Lipid panel reflex to direct LDL Fasting   Result Value Ref Range    Cholesterol 207 (H) <200 mg/dL    Triglycerides 68 <150 mg/dL    Direct Measure HDL 85 >=40 mg/dL    LDL Cholesterol Calculated 108 (H) <=100 mg/dL    Non HDL Cholesterol 122 <130 mg/dL    Narrative    Cholesterol  Desirable:  <200 mg/dL    Triglycerides  Normal:  Less than 150 mg/dL  Borderline High:  150-199 mg/dL  High:  200-499 mg/dL  Very High:  Greater than or equal to 500 mg/dL    Direct Measure HDL  Female:  Greater than or equal to 50 mg/dL   Male:  Greater than or equal to 40 mg/dL    LDL Cholesterol  Desirable:  <100mg/dL  Above Desirable:  100-129 mg/dL   Borderline High:  130-159 mg/dL   High:  160-189 mg/dL   Very High:  >= 190 mg/dL    Non HDL Cholesterol  Desirable:  130 mg/dL  Above Desirable:  130-159 mg/dL  Borderline High:  160-189 mg/dL  High:  190-219 mg/dL  Very High:  Greater than or equal to 220 mg/dL   Hemoglobin A1c   Result Value Ref Range    Hemoglobin A1C 5.1 0.0 - 5.6 %      Comment:      Normal <5.7%   Prediabetes 5.7-6.4%    Diabetes 6.5% or higher     Note: Adopted from ADA consensus guidelines.       If you have any questions or concerns, please call the clinic at the number listed above.       Sincerely,      [facility] Care Team

## 2023-07-14 LAB
CHOLEST SERPL-MCNC: 207 MG/DL
CREAT SERPL-MCNC: 0.62 MG/DL (ref 0.67–1.17)
GFR SERPL CREATININE-BSD FRML MDRD: >90 ML/MIN/1.73M2
HDLC SERPL-MCNC: 85 MG/DL
LDLC SERPL CALC-MCNC: 108 MG/DL
NONHDLC SERPL-MCNC: 122 MG/DL
TRIGL SERPL-MCNC: 68 MG/DL

## 2023-07-14 NOTE — RESULT ENCOUNTER NOTE
"The following letter pertains to your most recent diagnostic tests:    -Tests of kidney function are stable for you (Creatinine, GFR).       -Your total cholesterol is 207 which is above your goal of total cholesterol less than 200.    -Your triglycerides are 68 which are at your goal of triglycerides less than 150.    -Your HDL or \"good cholesterol\" is 85 which is at your goal of HDL cholesterol greater than 40.    -Your LDL cholesterol or \"bad cholesterol\" is 108 which is below your goal of LDL cholesterol less than <70.  Your LDL goal is based on your risk factors for artery disease.    -The hemoglobin is normal.  No anemia.       -Your hemoglobin A1c test which is a blood sugar test returned normal.  No diabetes.          Bottom line:  The cholesterol is not that high, but the risk for blood vessel disease is high enough to warrant consideration for statin therapy.    Based on your current cholesterol levels and your other risk factors, I estimate a 19.5% statistical chance for you developing significant blood vessel blockage over the next 10 years.  We call this statistic a 10-year atherosclerotic coronary vascular disease (ASCVD) risk score.    Anyone with an ASCVD risk score greater than 7.5% is considered to have elevated risk for significant blood vessel blockage.    Since your ASCVD risk score is higher than 7.5%, we want to make sure we are doing everything possible to reduce your chances of developing significant blockage.      We believe that statin medications such as atorvastatin (Lipitor) have protective effects in the blood vessels that extend beyond their ability to simply lower blood cholesterol numbers.  We know statin medications prevent the accumulation of blockage in blood vessels and we also know that statin medication stabilize and shrink existing blood vessel blockages.     Current expert guidelines recommend starting statin medication therapy for individuals with ASCVD risk scores greater " than 7.5%.  Therefore, I would recommend that you trying taking atorvastatin (Lipitor).     You should be informed that a very small minority of people who take atorvastatin can develop muscle pain and weakness as a side effect from that drug.  If you experience those side effects, then stop the drug and contact us.      If you start the medication, you should have a follow up fasting cholesterol panel after you have been taking the medication for 1-2 months.  You can schedule a lab appointment for that purpose.      Please contact us if you would like to get that medication started so we can send a prescritpion and arrange for appropriate follow up.       I would certainly understand if you have questions about this.  You are certainly more than welcome to schedule a virtual visit (telephone or video visit) with me to discuss this matter further and get all your questions answered if you have them.      Taking statins certainly does not get you off the hook for living a healthy lifestyle.  Even if you decide to start the atorvastatin (Lipitor), it would still be important to eat a diet low in saturated fats and high in fiber to control cholesterol and blood vessel disease risk.  Increase servings of fruits and vegetables!  Physical activity is also a very important way to modulate blood vessel disease risk.  A  good target to shoot for is 150 minutes (two and one-half hours) of moderate intensity physical activity per week.         Follow up:  We should check your lipids in about one month if you decide to start the atorvastatin (Lipitor).  Otherwise we will check in on how the amitriptyline is working in 3-4 weeks.         Sincerely,    Dr. Sotelo    The 10-year ASCVD risk score (Gena ROBERTS, et al., 2019) is: 19.5%    Values used to calculate the score:      Age: 73 years      Sex: Male      Is Non- : No      Diabetic: No      Tobacco smoker: No      Systolic Blood Pressure: 127 mmHg      Is  BP treated: Yes      HDL Cholesterol: 85 mg/dL      Total Cholesterol: 207 mg/dL

## 2023-07-20 ENCOUNTER — ANESTHESIA EVENT (OUTPATIENT)
Dept: SURGERY | Facility: CLINIC | Age: 73
End: 2023-07-20
Payer: MEDICARE

## 2023-07-20 ENCOUNTER — ANESTHESIA (OUTPATIENT)
Dept: SURGERY | Facility: CLINIC | Age: 73
End: 2023-07-20
Payer: MEDICARE

## 2023-07-20 ENCOUNTER — HOSPITAL ENCOUNTER (OUTPATIENT)
Facility: CLINIC | Age: 73
Discharge: HOME | End: 2023-07-20
Attending: INTERNAL MEDICINE | Admitting: INTERNAL MEDICINE
Payer: MEDICARE

## 2023-07-20 VITALS
HEART RATE: 78 BPM | OXYGEN SATURATION: 96 % | DIASTOLIC BLOOD PRESSURE: 80 MMHG | TEMPERATURE: 97.1 F | HEIGHT: 76 IN | WEIGHT: 201.3 LBS | SYSTOLIC BLOOD PRESSURE: 144 MMHG | BODY MASS INDEX: 24.51 KG/M2 | RESPIRATION RATE: 16 BRPM

## 2023-07-20 LAB — COLONOSCOPY: NORMAL

## 2023-07-20 PROCEDURE — 258N000003 HC RX IP 258 OP 636: Performed by: NURSE ANESTHETIST, CERTIFIED REGISTERED

## 2023-07-20 PROCEDURE — 370N000017 HC ANESTHESIA TECHNICAL FEE, PER MIN: Performed by: INTERNAL MEDICINE

## 2023-07-20 PROCEDURE — 360N000075 HC SURGERY LEVEL 2, PER MIN: Performed by: INTERNAL MEDICINE

## 2023-07-20 PROCEDURE — 250N000009 HC RX 250: Performed by: NURSE ANESTHETIST, CERTIFIED REGISTERED

## 2023-07-20 PROCEDURE — 999N000141 HC STATISTIC PRE-PROCEDURE NURSING ASSESSMENT: Performed by: INTERNAL MEDICINE

## 2023-07-20 PROCEDURE — 250N000011 HC RX IP 250 OP 636: Performed by: NURSE ANESTHETIST, CERTIFIED REGISTERED

## 2023-07-20 PROCEDURE — 88305 TISSUE EXAM BY PATHOLOGIST: CPT | Mod: TC | Performed by: INTERNAL MEDICINE

## 2023-07-20 PROCEDURE — 710N000012 HC RECOVERY PHASE 2, PER MINUTE: Performed by: INTERNAL MEDICINE

## 2023-07-20 PROCEDURE — 272N000001 HC OR GENERAL SUPPLY STERILE: Performed by: INTERNAL MEDICINE

## 2023-07-20 PROCEDURE — 88305 TISSUE EXAM BY PATHOLOGIST: CPT | Mod: 26 | Performed by: PATHOLOGY

## 2023-07-20 RX ORDER — PROPOFOL 10 MG/ML
INJECTION, EMULSION INTRAVENOUS PRN
Status: DISCONTINUED | OUTPATIENT
Start: 2023-07-20 | End: 2023-07-20

## 2023-07-20 RX ORDER — ATROPINE SULFATE 0.1 MG/ML
1 INJECTION INTRAVENOUS
Status: DISCONTINUED | OUTPATIENT
Start: 2023-07-20 | End: 2023-07-20 | Stop reason: HOSPADM

## 2023-07-20 RX ORDER — DEXAMETHASONE SODIUM PHOSPHATE 4 MG/ML
4 INJECTION, SOLUTION INTRA-ARTICULAR; INTRALESIONAL; INTRAMUSCULAR; INTRAVENOUS; SOFT TISSUE
Status: DISCONTINUED | OUTPATIENT
Start: 2023-07-20 | End: 2023-07-20 | Stop reason: HOSPADM

## 2023-07-20 RX ORDER — NALOXONE HYDROCHLORIDE 0.4 MG/ML
0.4 INJECTION, SOLUTION INTRAMUSCULAR; INTRAVENOUS; SUBCUTANEOUS
Status: DISCONTINUED | OUTPATIENT
Start: 2023-07-20 | End: 2023-07-20 | Stop reason: HOSPADM

## 2023-07-20 RX ORDER — OXYCODONE HYDROCHLORIDE 5 MG/1
10 TABLET ORAL
Status: DISCONTINUED | OUTPATIENT
Start: 2023-07-20 | End: 2023-07-20 | Stop reason: HOSPADM

## 2023-07-20 RX ORDER — EPINEPHRINE 1 MG/ML
0.1 INJECTION, SOLUTION, CONCENTRATE INTRAVENOUS
Status: DISCONTINUED | OUTPATIENT
Start: 2023-07-20 | End: 2023-07-20 | Stop reason: HOSPADM

## 2023-07-20 RX ORDER — LIDOCAINE 40 MG/G
CREAM TOPICAL
Status: DISCONTINUED | OUTPATIENT
Start: 2023-07-20 | End: 2023-07-20 | Stop reason: HOSPADM

## 2023-07-20 RX ORDER — LIDOCAINE HYDROCHLORIDE 10 MG/ML
INJECTION, SOLUTION INFILTRATION; PERINEURAL PRN
Status: DISCONTINUED | OUTPATIENT
Start: 2023-07-20 | End: 2023-07-20

## 2023-07-20 RX ORDER — SODIUM CHLORIDE, SODIUM LACTATE, POTASSIUM CHLORIDE, CALCIUM CHLORIDE 600; 310; 30; 20 MG/100ML; MG/100ML; MG/100ML; MG/100ML
INJECTION, SOLUTION INTRAVENOUS CONTINUOUS PRN
Status: DISCONTINUED | OUTPATIENT
Start: 2023-07-20 | End: 2023-07-20

## 2023-07-20 RX ORDER — SODIUM CHLORIDE, SODIUM LACTATE, POTASSIUM CHLORIDE, CALCIUM CHLORIDE 600; 310; 30; 20 MG/100ML; MG/100ML; MG/100ML; MG/100ML
INJECTION, SOLUTION INTRAVENOUS CONTINUOUS
Status: DISCONTINUED | OUTPATIENT
Start: 2023-07-20 | End: 2023-07-20 | Stop reason: HOSPADM

## 2023-07-20 RX ORDER — GLYCOPYRROLATE 0.2 MG/ML
INJECTION, SOLUTION INTRAMUSCULAR; INTRAVENOUS PRN
Status: DISCONTINUED | OUTPATIENT
Start: 2023-07-20 | End: 2023-07-20

## 2023-07-20 RX ORDER — ONDANSETRON 2 MG/ML
4 INJECTION INTRAMUSCULAR; INTRAVENOUS EVERY 30 MIN PRN
Status: DISCONTINUED | OUTPATIENT
Start: 2023-07-20 | End: 2023-07-20 | Stop reason: HOSPADM

## 2023-07-20 RX ORDER — ONDANSETRON 4 MG/1
4 TABLET, ORALLY DISINTEGRATING ORAL EVERY 30 MIN PRN
Status: DISCONTINUED | OUTPATIENT
Start: 2023-07-20 | End: 2023-07-20 | Stop reason: HOSPADM

## 2023-07-20 RX ORDER — SIMETHICONE 40MG/0.6ML
133 SUSPENSION, DROPS(FINAL DOSAGE FORM)(ML) ORAL
Status: DISCONTINUED | OUTPATIENT
Start: 2023-07-20 | End: 2023-07-20 | Stop reason: HOSPADM

## 2023-07-20 RX ORDER — ONDANSETRON 4 MG/1
4 TABLET, ORALLY DISINTEGRATING ORAL EVERY 6 HOURS PRN
Status: DISCONTINUED | OUTPATIENT
Start: 2023-07-20 | End: 2023-07-20 | Stop reason: HOSPADM

## 2023-07-20 RX ORDER — FENTANYL CITRATE 50 UG/ML
25 INJECTION, SOLUTION INTRAMUSCULAR; INTRAVENOUS
Status: DISCONTINUED | OUTPATIENT
Start: 2023-07-20 | End: 2023-07-20 | Stop reason: HOSPADM

## 2023-07-20 RX ORDER — ACETAMINOPHEN 325 MG/1
975 TABLET ORAL ONCE
Status: DISCONTINUED | OUTPATIENT
Start: 2023-07-20 | End: 2023-07-20 | Stop reason: HOSPADM

## 2023-07-20 RX ORDER — NALOXONE HYDROCHLORIDE 0.4 MG/ML
0.2 INJECTION, SOLUTION INTRAMUSCULAR; INTRAVENOUS; SUBCUTANEOUS
Status: DISCONTINUED | OUTPATIENT
Start: 2023-07-20 | End: 2023-07-20 | Stop reason: HOSPADM

## 2023-07-20 RX ORDER — ONDANSETRON 2 MG/ML
INJECTION INTRAMUSCULAR; INTRAVENOUS PRN
Status: DISCONTINUED | OUTPATIENT
Start: 2023-07-20 | End: 2023-07-20

## 2023-07-20 RX ORDER — FLUMAZENIL 0.1 MG/ML
0.2 INJECTION, SOLUTION INTRAVENOUS
Status: DISCONTINUED | OUTPATIENT
Start: 2023-07-20 | End: 2023-07-20 | Stop reason: HOSPADM

## 2023-07-20 RX ORDER — ONDANSETRON 2 MG/ML
4 INJECTION INTRAMUSCULAR; INTRAVENOUS EVERY 6 HOURS PRN
Status: DISCONTINUED | OUTPATIENT
Start: 2023-07-20 | End: 2023-07-20 | Stop reason: HOSPADM

## 2023-07-20 RX ORDER — FENTANYL CITRATE 50 UG/ML
50-100 INJECTION, SOLUTION INTRAMUSCULAR; INTRAVENOUS EVERY 5 MIN PRN
Status: DISCONTINUED | OUTPATIENT
Start: 2023-07-20 | End: 2023-07-20 | Stop reason: HOSPADM

## 2023-07-20 RX ORDER — ONDANSETRON 2 MG/ML
4 INJECTION INTRAMUSCULAR; INTRAVENOUS
Status: DISCONTINUED | OUTPATIENT
Start: 2023-07-20 | End: 2023-07-20 | Stop reason: HOSPADM

## 2023-07-20 RX ORDER — PROCHLORPERAZINE MALEATE 5 MG
5 TABLET ORAL EVERY 6 HOURS PRN
Status: DISCONTINUED | OUTPATIENT
Start: 2023-07-20 | End: 2023-07-20 | Stop reason: HOSPADM

## 2023-07-20 RX ORDER — DIPHENHYDRAMINE HYDROCHLORIDE 50 MG/ML
25-50 INJECTION INTRAMUSCULAR; INTRAVENOUS
Status: DISCONTINUED | OUTPATIENT
Start: 2023-07-20 | End: 2023-07-20 | Stop reason: HOSPADM

## 2023-07-20 RX ORDER — OXYCODONE HYDROCHLORIDE 5 MG/1
5 TABLET ORAL
Status: DISCONTINUED | OUTPATIENT
Start: 2023-07-20 | End: 2023-07-20 | Stop reason: HOSPADM

## 2023-07-20 RX ADMIN — LIDOCAINE HYDROCHLORIDE 50 MG: 10 INJECTION, SOLUTION INFILTRATION; PERINEURAL at 08:15

## 2023-07-20 RX ADMIN — LIDOCAINE HYDROCHLORIDE 30 MG: 10 INJECTION, SOLUTION INFILTRATION; PERINEURAL at 08:32

## 2023-07-20 RX ADMIN — SODIUM CHLORIDE, POTASSIUM CHLORIDE, SODIUM LACTATE AND CALCIUM CHLORIDE: 600; 310; 30; 20 INJECTION, SOLUTION INTRAVENOUS at 07:01

## 2023-07-20 RX ADMIN — GLYCOPYRROLATE 0.1 MG: 0.2 INJECTION, SOLUTION INTRAMUSCULAR; INTRAVENOUS at 08:32

## 2023-07-20 RX ADMIN — ONDANSETRON 4 MG: 2 INJECTION INTRAMUSCULAR; INTRAVENOUS at 08:15

## 2023-07-20 RX ADMIN — GLYCOPYRROLATE 0.1 MG: 0.2 INJECTION, SOLUTION INTRAMUSCULAR; INTRAVENOUS at 08:15

## 2023-07-20 RX ADMIN — PROPOFOL 125 MCG/KG/MIN: 10 INJECTION, EMULSION INTRAVENOUS at 08:15

## 2023-07-20 ASSESSMENT — ACTIVITIES OF DAILY LIVING (ADL)
ADLS_ACUITY_SCORE: 35
ADLS_ACUITY_SCORE: 35

## 2023-07-20 NOTE — ANESTHESIA POSTPROCEDURE EVALUATION
Patient: Alexandro Lee    Procedure: Procedure(s):  Colonoscopy       Anesthesia Type:  MAC    Note:  Disposition: Outpatient   Postop Pain Control: Uneventful            Sign Out: Well controlled pain   PONV: No   Neuro/Psych: Uneventful            Sign Out: Acceptable/Baseline neuro status   Airway/Respiratory: Uneventful            Sign Out: Acceptable/Baseline resp. status   CV/Hemodynamics: Uneventful            Sign Out: Acceptable CV status; No obvious hypovolemia; No obvious fluid overload   Other NRE: NONE   DID A NON-ROUTINE EVENT OCCUR? No           Last vitals:  Vitals Value Taken Time   /80 07/20/23 0926   Temp 97.1  F (36.2  C) 07/20/23 0910   Pulse 78 07/20/23 0926   Resp 16 07/20/23 0926   SpO2 97 % 07/20/23 0927   Vitals shown include unvalidated device data.    Electronically Signed By: José Miguel Mahmood MD  July 20, 2023  4:14 PM

## 2023-07-20 NOTE — ANESTHESIA CARE TRANSFER NOTE
Patient: Alexandro Lee    Procedure: Procedure(s):  Colonoscopy       Diagnosis: Screen for colon cancer [Z12.11]  Diagnosis Additional Information: No value filed.    Anesthesia Type:   MAC     Note:    Oropharynx: oropharynx clear of all foreign objects and spontaneously breathing  Level of Consciousness: awake      Independent Airway: airway patency satisfactory and stable  Dentition: dentition unchanged  Vital Signs Stable: post-procedure vital signs reviewed and stable  Report to RN Given: handoff report given  Patient transferred to: Phase II  Comments: No issues   Handoff Report: Identifed the Patient, Identified the Reponsible Provider, Reviewed the pertinent medical history, Discussed the surgical course, Reviewed Intra-OP anesthesia mangement and issues during anesthesia and Allowed opportunity for questions and acknowledgement of understanding      Vitals:  Vitals Value Taken Time   /65 07/20/23 0840   Temp 97.1  F (36.2  C) 07/20/23 0840   Pulse 83 07/20/23 0840   Resp 18 07/20/23 0840   SpO2 99 % 07/20/23 0843   Vitals shown include unvalidated device data.    Electronically Signed By: LUCIE Davis CRNA  July 20, 2023  8:44 AM

## 2023-07-20 NOTE — ANESTHESIA PREPROCEDURE EVALUATION
Anesthesia Pre-Procedure Evaluation    Patient: Alexandro Lee   MRN: [id number] : 1950        Procedure : Procedure(s):  Colonoscopy          Past Medical History:   Diagnosis Date     Arthritis     osteoarthritis     Hyperlipidemia      Moderate persistent asthma 2017     Unspecified essential hypertension     BPs run lower at home than in the office.      Past Surgical History:   Procedure Laterality Date     ARTHROPLASTY HIP ANTERIOR Left 3/17/2016    Procedure: ARTHROPLASTY HIP ANTERIOR;  Surgeon: Hilario Hicks MD;  Location: [facility]     BIOPSY      prostate     ORTHOPEDIC SURGERY      ACL and PCL repairs left knee     ZZC NONSPECIFIC PROCEDURE      Left ACL and PCL repair      Allergies   Allergen Reactions     Amlodipine Cough     Amoxicillin Hives     hives     Lisinopril Cough     Cough       Losartan Cough     Tetanus Toxoid, Adsorbed Other (See Comments)     DPT as a child caused severe swelling of arm     Tetanus Toxoids      DPT as a child caused severe swelling of arm      Social History     Tobacco Use     Smoking status: Never     Smokeless tobacco: Never   Substance Use Topics     Alcohol use: Yes     Alcohol/week: 0.0 standard drinks of alcohol     Comment: 2 glasses wine day      Wt Readings from Last 1 Encounters:   23 91.3 kg (201 lb 4.8 oz)        Anesthesia Evaluation   Pt has had prior anesthetic. Type: General and MAC.        ROS/MED HX  ENT/Pulmonary:     (+) Intermittent, asthma Treatment: Inhaler prn,      Neurologic:  - neg neurologic ROS     Cardiovascular:     (+) Dyslipidemia hypertension-----    METS/Exercise Tolerance:     Hematologic: Comments: Lab Test        23                       1552          1417          0955          1001          0549          1716          1824          WBC           --           --          11.1*        17.6*        18.0*          < >          --           HGB          16.9          --          14.3         13.8         14.0           < >         --           MCV           --           --          87           88           88             < >         --           PLT           --           --          349          376          309            < >         --           INR           --          1.0           --           --           --           --          1.09           < > = values in this interval not displayed.                  Lab Test        07/13/23 04/28/22 04/26/22 04/24/22 08/17/21                       1552          1105          1001          1716          1501          NA            --           --          135          130*         137           POTASSIUM     --          3.3*         3.7          4.4          3.7           CHLORIDE      --           --          100          95           104           CO2           --           --          32           30           31            BUN           --           --          19           16           12            CR           0.62*         --          0.36*        0.37*        0.71          ANIONGAP      --           --          3            5            2*            ISABELL           --           --          8.6          9.4          9.4           GLC           --           --          135*         120*         107*                Musculoskeletal:       GI/Hepatic:     (+) liver disease,     Renal/Genitourinary:       Endo:  - neg endo ROS     Psychiatric/Substance Use:  - neg psychiatric ROS     Infectious Disease:  - neg infectious disease ROS     Malignancy:  - neg malignancy ROS     Other:            Physical Exam    Airway        Mallampati: II   TM distance: > 3 FB   Neck ROM: full   Mouth opening: > 3 cm    Respiratory Devices and Support         Dental       (+) Minor Abnormalities - some fillings, tiny chips      Cardiovascular   cardiovascular exam normal          Pulmonary    pulmonary exam normal                OUTSIDE LABS:  CBC:   Lab Results   Component Value Date    WBC 11.1 (H) 04/27/2022    WBC 17.6 (H) 04/26/2022    HGB 16.9 07/13/2023    HGB 14.3 04/27/2022    HCT 43.3 04/27/2022    HCT 41.9 04/26/2022     04/27/2022     04/26/2022     BMP:   Lab Results   Component Value Date     04/26/2022     (L) 04/24/2022    POTASSIUM 3.3 (L) 04/28/2022    POTASSIUM 3.7 04/26/2022    CHLORIDE 100 04/26/2022    CHLORIDE 95 04/24/2022    CO2 32 04/26/2022    CO2 30 04/24/2022    BUN 19 04/26/2022    BUN 16 04/24/2022    CR 0.62 (L) 07/13/2023    CR 0.36 (L) 04/26/2022     (H) 04/26/2022     (H) 04/24/2022     COAGS:   Lab Results   Component Value Date    INR 1.0 04/03/2023     POC: No results found for: BGM, HCG, HCGS  HEPATIC:   Lab Results   Component Value Date    ALBUMIN 2.5 (L) 04/26/2022    PROTTOTAL 7.3 04/26/2022     (H) 04/26/2022    AST 81 (H) 04/26/2022     (H) 01/19/2011    ALKPHOS 79 04/26/2022    BILITOTAL 0.6 04/26/2022     OTHER:   Lab Results   Component Value Date    A1C 5.1 07/13/2023    ISABELL 8.6 04/26/2022    MAG 2.2 04/28/2022    SED 11 01/25/2012       Anesthesia Plan    ASA Status:  2      Anesthesia Type: MAC.     - Reason for MAC: immobility needed   Induction: Propofol.   Maintenance: TIVA.        Consents    Anesthesia Plan(s) and associated risks, benefits, and realistic alternatives discussed. Questions answered and patient/representative(s) expressed understanding.    - Discussed:     - Discussed with:  Patient      - Extended Intubation/Ventilatory Support Discussed: No.      - Patient is DNR/DNI Status: No    Use of blood products discussed: Yes.     - Discussed with: Patient.     - Consented: consented to blood products            Reason for refusal: other.     Postoperative Care    Pain management: IV analgesics.   PONV prophylaxis: Ondansetron (or other 5HT-3), Dexamethasone or Solumedrol      Comments:                José Miguel Mahmood MD

## 2023-07-24 LAB
PATH REPORT.COMMENTS IMP SPEC: NORMAL
PATH REPORT.COMMENTS IMP SPEC: NORMAL
PATH REPORT.FINAL DX SPEC: NORMAL
PATH REPORT.GROSS SPEC: NORMAL
PATH REPORT.MICROSCOPIC SPEC OTHER STN: NORMAL
PATH REPORT.RELEVANT HX SPEC: NORMAL
PHOTO IMAGE: NORMAL

## 2023-08-03 ENCOUNTER — VIRTUAL VISIT (OUTPATIENT)
Dept: FAMILY MEDICINE | Facility: CLINIC | Age: 73
End: 2023-08-03
Payer: MEDICARE

## 2023-08-03 DIAGNOSIS — I10 BENIGN ESSENTIAL HYPERTENSION: ICD-10-CM

## 2023-08-03 DIAGNOSIS — E78.5 HYPERLIPIDEMIA LDL GOAL <100: Primary | ICD-10-CM

## 2023-08-03 DIAGNOSIS — J38.3 VOCAL CORD DYSFUNCTION: ICD-10-CM

## 2023-08-03 PROCEDURE — 99442 PR PHYSICIAN TELEPHONE EVALUATION 11-20 MIN: CPT | Mod: 95 | Performed by: INTERNAL MEDICINE

## 2023-08-03 RX ORDER — ATORVASTATIN CALCIUM 20 MG/1
20 TABLET, FILM COATED ORAL DAILY
Qty: 90 TABLET | Refills: 3 | Status: SHIPPED | OUTPATIENT
Start: 2023-08-03 | End: 2024-03-28

## 2023-08-03 RX ORDER — HYDRALAZINE HYDROCHLORIDE 50 MG/1
50 TABLET, FILM COATED ORAL 2 TIMES DAILY
Qty: 180 TABLET | Refills: 3 | Status: SHIPPED | OUTPATIENT
Start: 2023-08-03 | End: 2024-03-28

## 2023-08-03 RX ORDER — FELODIPINE 10 MG/1
10 TABLET, EXTENDED RELEASE ORAL DAILY
Qty: 90 TABLET | Refills: 3 | Status: SHIPPED | OUTPATIENT
Start: 2023-08-03 | End: 2024-03-28

## 2023-08-03 RX ORDER — ATENOLOL 100 MG/1
TABLET ORAL
Qty: 90 TABLET | Refills: 3 | Status: SHIPPED | OUTPATIENT
Start: 2023-08-03 | End: 2024-03-28

## 2023-08-03 NOTE — PROGRESS NOTES
Alexandro is a 73 year old who is being evaluated via a billable telephone visit.      What phone number would you like to be contacted at? [phone]  How would you like to obtain your AVS? Moon    Distant Location (provider location):  On-site    Assessment & Plan     Hyperlipidemia LDL goal <100  Discussed risks and benefits, common side effects of atorvastatin.  Start atorvastatin for primary prevention, return after 1 month of therapy to recheck lipids, goal LDL less than 70 to achieve a 50% LDL reduction from baseline  - atorvastatin (LIPITOR) 20 MG tablet; Take 1 tablet (20 mg) by mouth daily  - Lipid panel reflex to direct LDL Fasting; Future    Benign essential hypertension  He would like blood pressure medication sent to his mail order pharmacy  - atenolol (TENORMIN) 100 MG tablet; TAKE 1/2 TABLET TWO TIMES ADAY  - felodipine ER (PLENDIL) 10 MG 24 hr tablet; Take 1 tablet (10 mg) by mouth daily  - hydrALAZINE (APRESOLINE) 50 MG tablet; Take 1 tablet (50 mg) by mouth 2 times daily\    Vocal cord dysfunction  Continue 2 x 10 mg tablet = 20 mg/day dose of amitriptyline to help with vocal cord dysfunction symptoms  He declined increasing to 25 mg for ease of administration as there is a 25 mg tablet    No LOS data to display   Time spent by me doing chart review, history and exam, documentation and further activities per the note           Garrett Sotelo MD  [facility]    Jaiden Mc is a 73 year old, presenting for the following health issues:  Follow Up (3 weeks), Recheck Medication, and Cold Symptoms (Cough, sinus, no headaches)      History of Present Illness       Reason for visit:  Follow up    He eats 2-3 servings of fruits and vegetables daily.He consumes 0 sweetened beverage(s) daily.He exercises with enough effort to increase his heart rate 9 or less minutes per day.  He exercises with enough effort to increase his heart rate 3 or less days per week.   He is taking  medications regularly.     Appointment to discuss amitriptyline for vocal cord dysfunction, primary prevention statin    The amitriptyline is working very well.  10 mg worked but not well enough, 20 mg worked very well, 30 mg caused intolerable dry mouth so he has settled on the 20 mg/day dose.    We had a nice discussion about the rationale for statin therapy for primary prevention    Review of Systems         Objective           Vitals:  No vitals were obtained today due to virtual visit.    Physical Exam   healthy, alert, and no distress  PSYCH: Alert and oriented times 3; coherent speech, normal   rate and volume, able to articulate logical thoughts, able   to abstract reason, no tangential thoughts, no hallucinations   or delusions  His affect is normal  RESP: No cough, no audible wheezing, able to talk in full sentences  Remainder of exam unable to be completed due to telephone visits                Phone call duration: 12 minutes

## 2023-08-25 RX ORDER — AMITRIPTYLINE HYDROCHLORIDE 10 MG/1
10 TABLET ORAL AT BEDTIME
OUTPATIENT
Start: 2023-08-25

## 2023-08-25 NOTE — TELEPHONE ENCOUNTER
Patient or pharmacy has requested too soon. Refused to pharmacy.  
Pt was instructed by pharmacy to call his clinic and request the following changes to his prescription for amitriptyline (ELAVIL) 10 MG tablet :    1.) take two tablets at bedtime    -pt reporting that he has been taking two tablets at bedtime and that has been working well for him. Pt refusing three tablets due dry mouth as a side effect. Pt reporting the there are issues with his insurance stating that he can not get a refill in time due to the current prescription. Pt has five days left of his prescription at home. requesting a refill for 60 tablets per month.    Routing to refill pool; please review dose and frequency; modify prn      [id number] - [place], [place] - [address]   
impaired balance/decreased flexibility/impaired motor control/impaired postural control/decreased ROM/decreased strength

## 2023-10-20 ENCOUNTER — TRANSFERRED RECORDS (OUTPATIENT)
Dept: HEALTH INFORMATION MANAGEMENT | Facility: CLINIC | Age: 73
End: 2023-10-20
Payer: MEDICARE

## 2023-12-21 ENCOUNTER — LAB (OUTPATIENT)
Dept: LAB | Facility: CLINIC | Age: 73
End: 2023-12-21
Payer: MEDICARE

## 2023-12-21 DIAGNOSIS — E78.5 HYPERLIPIDEMIA LDL GOAL <100: ICD-10-CM

## 2023-12-21 LAB
CHOLEST SERPL-MCNC: 160 MG/DL
FASTING STATUS PATIENT QL REPORTED: YES
HDLC SERPL-MCNC: 91 MG/DL
LDLC SERPL CALC-MCNC: 54 MG/DL
NONHDLC SERPL-MCNC: 69 MG/DL
TRIGL SERPL-MCNC: 76 MG/DL

## 2023-12-21 PROCEDURE — 80061 LIPID PANEL: CPT

## 2023-12-21 PROCEDURE — 36415 COLL VENOUS BLD VENIPUNCTURE: CPT

## 2023-12-21 NOTE — LETTER
[place] [address] [facility]  [address]  [place]  [address]  Tel: [phone]    December 22, 2023    Alexandro MAYO Lee  [address] [address] [facility] [address]        Dear Mr. Lee,    The following letter pertains to your most recent diagnostic tests:     The cholesterol looks much better.  Keep taking the atorvastatin (Lipitor) as you are.         Sincerely,     Dr. Sotelo/L        Enclosure: Lab Results  Results for orders placed or performed in visit on 12/21/23   Lipid panel reflex to direct LDL Fasting     Status: None   Result Value Ref Range    Cholesterol 160 <200 mg/dL    Triglycerides 76 <150 mg/dL    Direct Measure HDL 91 >=40 mg/dL    LDL Cholesterol Calculated 54 <=100 mg/dL    Non HDL Cholesterol 69 <130 mg/dL    Patient Fasting > 8hrs? Yes     Narrative    Cholesterol  Desirable:  <200 mg/dL    Triglycerides  Normal:  Less than 150 mg/dL  Borderline High:  150-199 mg/dL  High:  200-499 mg/dL  Very High:  Greater than or equal to 500 mg/dL    Direct Measure HDL  Female:  Greater than or equal to 50 mg/dL   Male:  Greater than or equal to 40 mg/dL    LDL Cholesterol  Desirable:  <100mg/dL  Above Desirable:  100-129 mg/dL   Borderline High:  130-159 mg/dL   High:  160-189 mg/dL   Very High:  >= 190 mg/dL    Non HDL Cholesterol  Desirable:  130 mg/dL  Above Desirable:  130-159 mg/dL  Borderline High:  160-189 mg/dL  High:  190-219 mg/dL  Very High:  Greater than or equal to 220 mg/dL

## 2023-12-22 NOTE — RESULT ENCOUNTER NOTE
The following letter pertains to your most recent diagnostic tests:    The cholesterol looks much better.  Keep taking the atorvastatin (Lipitor) as you are.        Sincerely,    Dr. Sotelo

## 2024-02-22 SDOH — HEALTH STABILITY: PHYSICAL HEALTH: ON AVERAGE, HOW MANY MINUTES DO YOU ENGAGE IN EXERCISE AT THIS LEVEL?: PATIENT DECLINED

## 2024-02-22 ASSESSMENT — SOCIAL DETERMINANTS OF HEALTH (SDOH)
HOW OFTEN DO YOU ATTENT MEETINGS OF THE CLUB OR ORGANIZATION YOU BELONG TO?: NEVER
IN A TYPICAL WEEK, HOW MANY TIMES DO YOU TALK ON THE PHONE WITH FAMILY, FRIENDS, OR NEIGHBORS?: MORE THAN THREE TIMES A WEEK
DO YOU BELONG TO ANY CLUBS OR ORGANIZATIONS SUCH AS CHURCH GROUPS UNIONS, FRATERNAL OR ATHLETIC GROUPS, OR SCHOOL GROUPS?: NO
HOW OFTEN DO YOU GET TOGETHER WITH FRIENDS OR RELATIVES?: MORE THAN THREE TIMES A WEEK
HOW OFTEN DO YOU GET TOGETHER WITH FRIENDS OR RELATIVES?: MORE THAN THREE TIMES A WEEK
HOW OFTEN DO YOU ATTEND CHURCH OR RELIGIOUS SERVICES?: PATIENT DECLINED

## 2024-03-18 ASSESSMENT — ASTHMA QUESTIONNAIRES
QUESTION_1 LAST FOUR WEEKS HOW MUCH OF THE TIME DID YOUR ASTHMA KEEP YOU FROM GETTING AS MUCH DONE AT WORK, SCHOOL OR AT HOME: MOST OF THE TIME
ACT_TOTALSCORE: 15
QUESTION_4 LAST FOUR WEEKS HOW OFTEN HAVE YOU USED YOUR RESCUE INHALER OR NEBULIZER MEDICATION (SUCH AS ALBUTEROL): ONCE A WEEK OR LESS
ACT_TOTALSCORE: 15
QUESTION_2 LAST FOUR WEEKS HOW OFTEN HAVE YOU HAD SHORTNESS OF BREATH: ONCE A DAY
QUESTION_5 LAST FOUR WEEKS HOW WOULD YOU RATE YOUR ASTHMA CONTROL: POORLY CONTROLLED
QUESTION_3 LAST FOUR WEEKS HOW OFTEN DID YOUR ASTHMA SYMPTOMS (WHEEZING, COUGHING, SHORTNESS OF BREATH, CHEST TIGHTNESS OR PAIN) WAKE YOU UP AT NIGHT OR EARLIER THAN USUAL IN THE MORNING: NOT AT ALL

## 2024-03-19 ENCOUNTER — NURSE TRIAGE (OUTPATIENT)
Dept: FAMILY MEDICINE | Facility: CLINIC | Age: 74
End: 2024-03-19

## 2024-03-19 ENCOUNTER — VIRTUAL VISIT (OUTPATIENT)
Dept: URGENT CARE | Facility: CLINIC | Age: 74
End: 2024-03-19
Payer: MEDICARE

## 2024-03-19 DIAGNOSIS — U07.1 INFECTION DUE TO 2019 NOVEL CORONAVIRUS: Primary | ICD-10-CM

## 2024-03-19 PROCEDURE — 99441 PR PHYSICIAN TELEPHONE EVALUATION 5-10 MIN: CPT | Mod: 95

## 2024-03-19 NOTE — PROGRESS NOTES
"Alexandro is a 74 year old who is being evaluated via a billable telephone visit.          Assessment & Plan     Infection due to 2019 novel coronavirus    - nirmatrelvir and ritonavir (PAXLOVID) 300 mg/100 mg therapy pack; Take 3 tablets by mouth 2 times daily for 5 days (Take 2 Nirmatrelvir tablets and 1 Ritonavir tablet twice daily for 5 days)        COVID-19 positive patient.  Encounter for consideration of medication intervention. Patient does qualify for a prescription. Full discussion with patient including medication options, risks and benefits. Potential drug interactions reviewed with patient.     Treatment Planned  [facility]id sent to [facility] in     Temporary change to home medications: Will hold atorvastatin for a total of 8 to 9 days.  Will take felodipine ER every other day for the next 10 days.  Will monitor his blood pressure and can hold this medication if his blood pressure gets below 100/60    Estimated body mass index is 24.5 kg/m  as calculated from the following:    Height as of 7/20/23: 1.93 m (6' 4\").    Weight as of 7/20/23: 91.3 kg (201 lb 4.8 oz).  GFR Estimate   Date Value Ref Range Status   07/13/2023 >90 >60 mL/min/1.73m2 Final   01/02/2020 >90 >60 mL/min/[1.73_m2] Final     Comment:     Non  GFR Calc  Starting 12/18/2018, serum creatinine based estimated GFR (eGFR) will be   calculated using the Chronic Kidney Disease Epidemiology Collaboration   (CKD-EPI) equation.       Lab Results   Component Value Date    [id number] Negative 04/24/2022       No follow-ups on file.    Subjective   Alexandro is a 74 year old, presenting for the following health issues:  No chief complaint on file.        HPI       COVID-19 Symptom Review  How many days ago did these symptoms start? yesterday    Are any of the following symptoms significant for you?  New or worsening difficulty breathing? No  Worsening cough? No  Fever or chills? No  Headache: YES  Sore throat: YES  Chest pain: No  Diarrhea: " No  Body aches? No    What treatments has patient tried? Nonsteroidals   Does patient live in a nursing home, group home, or shelter? No  Does patient have a way to get food/medications during quarantined? Yes, I have a friend or family member who can help me.                Review of Systems  Constitutional, HEENT, cardiovascular, pulmonary, gi and gu systems are negative, except as otherwise noted.      Objective           Vitals:  No vitals were obtained today due to virtual visit.    Physical Exam   GENERAL: alert and no distress  RESP: No audible wheeze, cough       Video-Visit Details    Type of service:  telephone visit lasted 6 minutes    Originating Location (pt. Location):     Distant Location (provider location):  Off-site  Platform used for Video Visit: Telephone  Signed Electronically by: [facility]

## 2024-03-19 NOTE — TELEPHONE ENCOUNTER
Patient calling to report positive Covid test and symptoms.    Onset: 03/18/24  Symptoms: sinus drainage, cough, fatigue, sore throat  Fever: denies chills, muscle aches; unable to take temp  Diff breathing: denies SOB  High risk disease: asthma  Other sx: denies fever, chills, muscle aches    RN COVID TREATMENT VISIT  03/19/24      The patient has been triaged and does not require a higher level of care.    Alexandro Lee  74 year old  Current weight? 205lb    Has the patient been seen by a primary care provider at an [facility] or [facility] within the past two years? Yes.   Have you been in close proximity to/do you have a known exposure to a person with a confirmed case of influenza? No.     General treatment eligibility:  Date of positive COVID test (PCR or at home)?  03/19/24    Are you or have you been hospitalized for this COVID-19 infection? No.   Have you received monoclonal antibodies or antiviral treatment for COVID-19 since this positive test? No.   Do you have any of the following conditions that place you at risk of being very sick from COVID-19?   - Age 50 years or older  - Chronic lung diseases such as asthma, bronchiectasis, COPD, interstitial lung disease, pulmonary embolism, pulmonary hypertension   Yes, patient has at least one high risk condition as noted above.     Current COVID symptoms:   - cough  - fatigue  - headache  - sore throat  - congestion or runny nose  Yes. Patient has at least one symptom as selected.     How many days since symptoms started? 5 days or less. Established patient, 12 years or older weighing at least 88.2 lbs, who has symptoms that started in the past 5 days, has not been hospitalized nor received treatment already, and is at risk for being very sick from COVID-19.     Treatment eligibility by RN:  Are you currently pregnant or nursing? No  Do you have a clinically significant hypersensitivity to nirmatrelvir or ritonavir, or toxic  epidermal necrolysis (TEN) or Marquez-Marques Syndrome? No  Do you have a history of hepatitis, any hepatic impairment on the Problem List (such as Child-Greene Class C, cirrhosis, fatty liver disease, alcoholic liver disease), or was the last liver lab (hepatic panel, ALT, AST, ALK Phos, bilirubin) elevated in the past 6 months? No  Do you have any history of severe renal impairment (eGFR < 30mL/min)? No    Is patient eligible to continue? Yes, patient meets all eligibility requirements for the RN COVID treatment (as denoted by all no responses above).     Current Outpatient Medications   Medication Sig Dispense Refill    albuterol (PROAIR HFA/PROVENTIL HFA/VENTOLIN HFA) 108 (90 Base) MCG/ACT inhaler Inhale 1-2 puffs into the lungs every 4 hours as needed for shortness of breath / dyspnea or wheezing 18 g 11    amitriptyline (ELAVIL) 10 MG tablet One tablet at bedtime for one week, then increase to two tablets at bedtime for one week, then increase to 3 tablets at bedtime. 90 tablet 11    atenolol (TENORMIN) 100 MG tablet TAKE 1/2 TABLET TWO TIMES ADAY 90 tablet 3    atorvastatin (LIPITOR) 20 MG tablet Take 1 tablet (20 mg) by mouth daily 90 tablet 3    cetirizine (ZYRTEC) 10 MG tablet Take 1 tablet (10 mg) by mouth every evening 90 tablet 11    felodipine ER (PLENDIL) 10 MG 24 hr tablet Take 1 tablet (10 mg) by mouth daily 90 tablet 3    Fluticasone-Umeclidin-Vilanterol (TRELEGY ELLIPTA) 100-62.5-25 MCG/INH oral inhaler Inhale 1 puff into the lungs daily      hydrALAZINE (APRESOLINE) 50 MG tablet Take 1 tablet (50 mg) by mouth 2 times daily 180 tablet 3    montelukast (SINGULAIR) 10 MG tablet TAKE 1 TABLET AT BEDTIME 90 tablet 3    Multiple Vitamins-Minerals (CENTRUM SILVER) per tablet Take 1 tablet by mouth daily      omeprazole 20 MG tablet Take 2 tablets (40 mg) by mouth daily         Medications from List 1 of the standing order (on medications that exclude the use of Paxlovid) that patient is taking: NONE. Is  patient taking Emmaus's Wort? No  Is patient taking Marisabel's Wort or any meds from List 1? No.   Medications from List 2 of the standing order (on meds that provider needs to adjust) that patient is taking: felodipine (Plendil), explained a provider visit is necessary to discuss medication adjustments while taking Paxlovid.  NONE. Is patient on any of the meds from List 2? Yes. Patient was scheduled with virtual urgent care:  3/19/2024 3:30 PM  VIRTUAL CARE PROVIDER [facility]     Patient was advised to call back with any new or worsening symptoms, patient expressed verbal understanding.    Sandra Castillo RN  [facility]        Reason for Disposition   HIGH RISK patient (e.g., weak immune system, age > 64 years, obesity with BMI of 30 or higher, pregnant, chronic lung disease or other chronic medical condition) and COVID symptoms (e.g., cough, fever)  (Exceptions: Already seen by doctor or NP/PA and no new or worsening symptoms.)    Additional Information   Negative: SEVERE difficulty breathing (e.g., struggling for each breath, speaks in single words)   Negative: Difficult to awaken or acting confused (e.g., disoriented, slurred speech)   Negative: Bluish (or gray) lips or face now   Negative: Shock suspected (e.g., cold/pale/clammy skin, too weak to stand, low BP, rapid pulse)   Negative: Sounds like a life-threatening emergency to the triager   Negative: Diagnosed or suspected COVID-19 and symptoms lasting 3 or more weeks   Negative: COVID-19 exposure and no symptoms   Negative: COVID-19 vaccine reaction suspected (e.g., fever, headache, muscle aches) occurring 1 to 3 days after getting vaccine   Negative: COVID-19 vaccine, questions about   Negative: Lives with someone known to have influenza (flu test positive) and flu-like symptoms (e.g., cough, runny nose, sore throat, SOB; with or without fever)   Negative: Possible COVID-19 symptoms and  triager concerned about severity of symptoms or other causes   Negative: COVID-19 and breastfeeding, questions about   Negative: SEVERE or constant chest pain or pressure  (Exception: Mild central chest pain, present only when coughing.)   Negative: MODERATE difficulty breathing (e.g., speaks in phrases, SOB even at rest, pulse 100-120)   Negative: Headache and stiff neck (can't touch chin to chest)   Negative: Oxygen level (e.g., pulse oximetry) 90% or lower   Negative: Chest pain or pressure  (Exception: MILD central chest pain, present only when coughing.)   Negative: Drinking very little and dehydration suspected (e.g., no urine > 12 hours, very dry mouth, very lightheaded)   Negative: Patient sounds very sick or weak to the triager   Negative: MILD difficulty breathing (e.g., minimal/no SOB at rest, SOB with walking, pulse <100)   Negative: Fever > 103 F (39.4 C)   Negative: Fever > 101 F (38.3 C) and over 60 years of age   Negative: Fever > 100.0 F (37.8 C) and bedridden (e.g., CVA, chronic illness, recovering from surgery)    Protocols used: Coronavirus (COVID-19) Diagnosed or [id number]

## 2024-03-19 NOTE — PATIENT INSTRUCTIONS
For informational purposes only. Not to replace the advice of your health care provider. Copyright   2022 [facility]. All rights reserved. Clinically reviewed by Lelo Hays, PharmD, BCACP. [employer] 442178 - REV 06/23.  COVID-19 Outpatient Treatments  Your care team can help you find the best treatments for COVID-19. Talk to a health care provider or refer to the FDA medicine fact sheets below.  Paxlovid (nirmatrelvir and ritonavir): https://www.paxlovid.Xatori/resources  Molnupiravir (Lagevrio): https://www.fda.gov/media/489877/download  Important: We can only prescribe Paxlovid or Molnupiravir when it can be started within 5 days of first having symptoms.  Paxlovid (nimatrelvir and ritonavir)  How it works  Two medicines (nirmatrelvir and ritonavir) are taken together. They stop the virus from growing. Less amount of virus is easier for your body to fight.  Benefits  Lowers risk of a hospital stay or death from COVID-19.  How to take  Medicine comes in a daily container with both medicine tablets. Take by mouth twice daily (once in the morning, once at night) for 5 days.  The number of tablets to take varies by patient.  Don't chew or break capsules. Swallow whole.  When to take  Take it as soon as possible and within 5 days of your first symptoms.  Who can take it  Patients must be 12 years or older weigh at least 88 pounds. Paxlovid is the preferred treatment for pregnant patients.  Possible side effects  Can cause altered sense of taste, diarrhea (loose, watery stools), high blood pressure, muscle aches.  Medicine conflicts  Some medicines may conflict with Paxlovid and may cause serious side effects.  Tell your care team about all the medicines you take, including prescription and over-the-counter medicines, vitamins, and herbal supplements.  Your care team will review your medicines to make sure that you can safely take Paxlovid.  Cautions  Paxlovid is not advised for patients with severe  kidney or liver disease. If you have kidney or liver problems, the dose may need to be changed.  If you're pregnant or breastfeeding, talk to your care team about your options.  If you take hormonal birth control (such as the Pill), then you or your partner should also use a non-hormonal form of birth control (such as a condom). Keep doing this for 1 menstrual cycle after your last dose of Paxlovid.  Molnupiravir (lagevrio)  How it works  Stops the virus from growing. Less amount of virus is easier for your body to fight.  Benefits  Lowers risk of a hospital stay or death from COVID-19.  How to take  Take 4 capsules by mouth every 12 hours (4 in the morning and 4 at night) for 5 days. Don't chew or break capsules. Swallow whole.  When to take  Take as soon as possible and within 5 days of your first symptoms.  Who can take it  Patients must be 18 years or older.   Possible side effects  Diarrhea (loose, watery stools), nausea (feeling sick to your stomach), dizziness, headaches.  Medicine conflicts  Right now, there are no known conflicts with other drugs. But tell your care team about all medicines you take.  Cautions  This medicine is not advised for patients who are pregnant.  If you are someone who could become pregnant, use trusted birth control until 4 days after your last dose of molnupiravir.  If your partner could become pregnant, you should use trusted birth control until 3 months after your last dose of molnupiravir.      Coping with Life After COVID-19  Being in the hospital because of COVID-19 is scary. Going home can be scary, too. You may face changes to your life, the way you work or what you can eat. It s hard to adjust to change, and it s normal to feel afraid, frustrated or even angry. These feelings usually go away over time. If your feelings don t start to get better, it s called  adjustment disorder.      What signs should I look out for?  Adjustment disorder can happen to anyone in a time of  stress. It makes it hard to cope with daily life. You may feel lonely or fight with loved ones, even if you re glad to be home. Watch for these signs:  Fear or worry  Hard time focusing  Sadness or anger  Trouble talking to family or friends  Feeling like you don t fit in or isolating yourself  Problems with sleep   Drinking alcohol or taking drugs to cope    What can I do?  You can help yourself get better. Feeling you have control helps you move forward. You may wonder if you ll be able to do things you did before. Be patient. Do your best to make the most of every day. Try to build relationships, be as active as you can, eat right and keep a sense of humor. Avoid smoking and drinking too much alcohol. Call your family doctor or clinic if you re not sure what to do. They can guide you to care or other services.    When should I get help?  Think about getting counseling if your sadness or frustration gets worse. Together with a trained counselor, you can talk about your problems adjusting to life after your hospital stay. You can come up with new ways to handle changes that give you more control. Your family doctor or care team can help you find a counselor.     Get help if you re thinking about hurting yourself. If you need help right away, call 911 or go to the nearest emergency room. You can also try the Crisis Text Line.    Crisis Text Line: 815-186 (http://www.crisistextline.org)  The Crisis Text Line serves anyone, in any crisis. It gives free, 24/7 support. Here's how it works:  Text HOME to 444550 from anywhere in the [place], anytime, about any type of crisis.  A live, trained Crisis Counselor will text you back quickly.  The volunteer Crisis Counselor can help you move from a  hot moment  to a  cool moment.  They can also help you work out a safety plan.

## 2024-03-28 ENCOUNTER — OFFICE VISIT (OUTPATIENT)
Dept: FAMILY MEDICINE | Facility: CLINIC | Age: 74
End: 2024-03-28
Payer: MEDICARE

## 2024-03-28 VITALS
OXYGEN SATURATION: 95 % | SYSTOLIC BLOOD PRESSURE: 126 MMHG | RESPIRATION RATE: 24 BRPM | HEIGHT: 77 IN | TEMPERATURE: 97.3 F | BODY MASS INDEX: 25.86 KG/M2 | DIASTOLIC BLOOD PRESSURE: 68 MMHG | HEART RATE: 90 BPM | WEIGHT: 219 LBS

## 2024-03-28 DIAGNOSIS — Z23 NEED FOR PROPHYLACTIC VACCINATION AGAINST HEPATITIS A: ICD-10-CM

## 2024-03-28 DIAGNOSIS — J45.40 MODERATE PERSISTENT ASTHMA WITHOUT COMPLICATION: ICD-10-CM

## 2024-03-28 DIAGNOSIS — G62.9 NEUROPATHY: ICD-10-CM

## 2024-03-28 DIAGNOSIS — J38.3 VOCAL CORD DYSFUNCTION: ICD-10-CM

## 2024-03-28 DIAGNOSIS — E78.5 HYPERLIPIDEMIA LDL GOAL <100: ICD-10-CM

## 2024-03-28 DIAGNOSIS — R47.89 OTHER SPEECH DISTURBANCES: ICD-10-CM

## 2024-03-28 DIAGNOSIS — Z29.11 NEED FOR VACCINATION AGAINST RESPIRATORY SYNCYTIAL VIRUS: ICD-10-CM

## 2024-03-28 DIAGNOSIS — Z23 NEED FOR PROPHYLACTIC VACCINATION AGAINST HEPATITIS B VIRUS: ICD-10-CM

## 2024-03-28 DIAGNOSIS — K75.81 NASH (NONALCOHOLIC STEATOHEPATITIS): ICD-10-CM

## 2024-03-28 DIAGNOSIS — I10 ESSENTIAL HYPERTENSION: Primary | ICD-10-CM

## 2024-03-28 DIAGNOSIS — Z23 NEED FOR TDAP VACCINATION: ICD-10-CM

## 2024-03-28 DIAGNOSIS — J00 CORYZA: ICD-10-CM

## 2024-03-28 PROBLEM — J45.901 EXACERBATION OF ASTHMA, UNSPECIFIED ASTHMA SEVERITY, UNSPECIFIED WHETHER PERSISTENT: Status: RESOLVED | Noted: 2022-04-24 | Resolved: 2024-03-28

## 2024-03-28 PROBLEM — J20.9 ACUTE BRONCHITIS, UNSPECIFIED ORGANISM: Status: RESOLVED | Noted: 2022-04-24 | Resolved: 2024-03-28

## 2024-03-28 PROCEDURE — 99214 OFFICE O/P EST MOD 30 MIN: CPT | Mod: 25 | Performed by: FAMILY MEDICINE

## 2024-03-28 PROCEDURE — 84443 ASSAY THYROID STIM HORMONE: CPT | Performed by: FAMILY MEDICINE

## 2024-03-28 PROCEDURE — 82607 VITAMIN B-12: CPT | Performed by: FAMILY MEDICINE

## 2024-03-28 PROCEDURE — G0439 PPPS, SUBSEQ VISIT: HCPCS | Performed by: FAMILY MEDICINE

## 2024-03-28 PROCEDURE — 36415 COLL VENOUS BLD VENIPUNCTURE: CPT | Performed by: FAMILY MEDICINE

## 2024-03-28 PROCEDURE — 86780 TREPONEMA PALLIDUM: CPT | Performed by: FAMILY MEDICINE

## 2024-03-28 PROCEDURE — 82746 ASSAY OF FOLIC ACID SERUM: CPT | Performed by: FAMILY MEDICINE

## 2024-03-28 RX ORDER — HYDRALAZINE HYDROCHLORIDE 50 MG/1
50 TABLET, FILM COATED ORAL 2 TIMES DAILY
Qty: 180 TABLET | Refills: 3 | Status: SHIPPED | OUTPATIENT
Start: 2024-03-28

## 2024-03-28 RX ORDER — FELODIPINE 10 MG/1
10 TABLET, EXTENDED RELEASE ORAL DAILY
Qty: 90 TABLET | Refills: 3 | Status: SHIPPED | OUTPATIENT
Start: 2024-03-28

## 2024-03-28 RX ORDER — AMITRIPTYLINE HYDROCHLORIDE 10 MG/1
TABLET ORAL
Qty: 180 TABLET | Refills: 3 | Status: SHIPPED | OUTPATIENT
Start: 2024-03-28

## 2024-03-28 RX ORDER — ATORVASTATIN CALCIUM 20 MG/1
20 TABLET, FILM COATED ORAL DAILY
Qty: 90 TABLET | Refills: 3 | Status: SHIPPED | OUTPATIENT
Start: 2024-03-28

## 2024-03-28 RX ORDER — ATENOLOL 100 MG/1
TABLET ORAL
Qty: 90 TABLET | Refills: 3 | Status: SHIPPED | OUTPATIENT
Start: 2024-03-28 | End: 2024-09-26

## 2024-03-28 RX ORDER — RESPIRATORY SYNCYTIAL VIRUS VACCINE 120MCG/0.5
0.5 KIT INTRAMUSCULAR ONCE
Qty: 1 EACH | Refills: 0 | Status: CANCELLED | OUTPATIENT
Start: 2024-03-28 | End: 2024-03-28

## 2024-03-28 RX ORDER — FLUTICASONE PROPIONATE 50 MCG
1 SPRAY, SUSPENSION (ML) NASAL DAILY
Qty: 16 G | Refills: 2 | Status: SHIPPED | OUTPATIENT
Start: 2024-03-28

## 2024-03-28 RX ORDER — IPRATROPIUM BROMIDE 21 UG/1
2 SPRAY, METERED NASAL EVERY 12 HOURS
Qty: 30 ML | Refills: 3 | Status: SHIPPED | OUTPATIENT
Start: 2024-03-28

## 2024-03-28 RX ORDER — AZELASTINE 1 MG/ML
1 SPRAY, METERED NASAL 2 TIMES DAILY
Qty: 30 ML | Refills: 3 | Status: SHIPPED | OUTPATIENT
Start: 2024-03-28

## 2024-03-28 ASSESSMENT — SOCIAL DETERMINANTS OF HEALTH (SDOH): HOW OFTEN DO YOU GET TOGETHER WITH FRIENDS OR RELATIVES?: MORE THAN THREE TIMES A WEEK

## 2024-03-28 NOTE — PATIENT INSTRUCTIONS
Hepatitis B # 3 (last one)    Hepatitis A vaccine seriies  (2)    RSV vaccine    COVID vaccine 4 months

## 2024-03-28 NOTE — PROGRESS NOTES
Preventive Care Visit  [facility]  José Miguel Aguilar MD, Family Medicine  Mar 28, 2024      Assessment & Plan   Problem List Items Addressed This Visit       Coryza     All previous therapies have failed.  ENT said there was nothing they could do.  Discussed available nasal therapies and their optimal usage.retrial         Relevant Medications    fluticasone (FLONASE) 50 MCG/ACT nasal spray    ipratropium (ATROVENT) 0.03 % nasal spray    azelastine (ASTELIN) 0.1 % nasal spray    Essential hypertension - Primary     Controlled continue         Relevant Medications    hydrALAZINE (APRESOLINE) 50 MG tablet    Hyperlipidemia LDL goal <100     Statin therapy.  Continue         Relevant Medications    atorvastatin (LIPITOR) 20 MG tablet    Moderate persistent asthma     Currently quiescent.  Pulmonology follows, manages         Relevant Medications    fluticasone (FLONASE) 50 MCG/ACT nasal spray    ipratropium (ATROVENT) 0.03 % nasal spray    azelastine (ASTELIN) 0.1 % nasal spray    BARON (nonalcoholic steatohepatitis)     She follows closely with all of gastroenterology.  Notes reviewed         Need for prophylactic vaccination against hepatitis B virus     He is beyond the recommended age for universal coverage, but avoiding additional hepatic insult may have benefit he is low risk         Need for prophylactic vaccination against hepatitis A     Discussed.  This would be reasonable given his underlying hepatic disease to avoid further insult         Need for Tdap vaccination     Recommended at pharmacy         Need for vaccination against respiratory syncytial virus     Recommended at pharmacy.  He thinks he has already had this.  Request records of immunizations given at [facility]         Neuropathy     He reports longstanding hypoesthetic feet with occasional but rare pain.  Broaden database for his likely peripheral neuropathy         Relevant Orders    Vitamin B12 (Completed)    TSH with  "free T4 reflex (Completed)    Treponema Abs w Reflex to RPR and Titer (Completed)    Folate (Completed)    RESOLVED: Other speech disturbances    Relevant Orders    TSH with free T4 reflex (Completed)    Vocal cord dysfunction     He reports that he underwent video stroboscopy for what sounds like laryngeal spasm.  Amitriptyline was immediately and continues to be effective.  Continue, request records         Relevant Medications    amitriptyline (ELAVIL) 10 MG tablet        Patient has been advised of split billing requirements and indicates understanding: Yes         BMI  Estimated body mass index is 25.97 kg/m  as calculated from the following:    Height as of this encounter: 1.956 m (6' 5\").    Weight as of this encounter: 99.3 kg (219 lb).       Counseling  Appropriate preventive services were discussed with this patient, including applicable screening as appropriate for fall prevention, nutrition, physical activity, Tobacco-use cessation, weight loss and cognition.  Checklist reviewing preventive services available has been given to the patient.  Reviewed patient's diet, addressing concerns and/or questions.   The patient was instructed to see the dentist every 6 months.   The patient reports drinking more than one alcoholic drink per day and sometimes engages in binge or excessive drinking. The patient was counseled and given information about possible harmful effects of excessive alcohol intake as well as where to get help for alcohol problems.         Jaiden Mc is a 74 year old, presenting for the following:  Wellness Visit        3/28/2024     2:49 PM   Additional Questions   Roomed by Jayashree         Health Care Directive  Patient does not have a Health Care Directive or Living Will:     HPI            3/28/2024   General Health   How would you rate your overall physical health? (!) FAIR         3/28/2024   Nutrition   Diet: Regular (no restrictions)         2/22/2024   Exercise   Average minutes " spent exercising at this level Patient declined         3/28/2024   Social Factors   Frequency of gathering with friends or relatives More than three times a week   Worry food won't last until get money to buy more No   Food not last or not have enough money for food? No   Do you have housing?  Yes   Are you worried about losing your housing? No   Lack of transportation? No   Unable to get utilities (heat,electricity)? No         3/28/2024   Activities of Daily Living- Home Safety   Needs help with the following daily activites None of the above   Safety concerns in the home None of the above         3/28/2024   Dental   Dentist two times every year? (!) NO         3/28/2024   Hearing Screening   Hearing concerns? None of the above         3/28/2024   Driving Risk Screening   Patient/family members have concerns about driving No         3/28/2024   General Alertness/Fatigue Screening   Have you been more tired than usual lately? No         3/28/2024   Urinary Incontinence Screening   Bothered by leaking urine in past 6 months No         3/28/2024   TB Screening   Were you born outside of the [place]? No         Today's PHQ-2 Score:       3/28/2024     2:50 PM   PHQ-2 ( 1999 [employer])   Q1: Little interest or pleasure in doing things 0   Q2: Feeling down, depressed or hopeless 0   PHQ-2 Score 0           3/28/2024   Substance Use   Alcohol more than 3/day or more than 7/wk Yes   How often do you have a drink containing alcohol 2 to 3 times a week   How many alcohol drinks on typical day 3 or 4   How often do you have 5+ drinks at one occasion Monthly   Audit 2/3 Score 3   How often not able to stop drinking once started Never   How often failed to do what normally expected Never   How often needed first drink in am after a heavy drinking session Never   How often feeling of guilt or remorse after drinking Never   How often unable to remember what happened the night before Never   Have you or someone else been injured  because of your drinking No   Has anyone been concerned or suggested you cut down on drinking No   TOTAL SCORE - AUDIT 6   Do you have a current opioid prescription? No   How severe/bad is pain from 1 to 10? 1/10   Do you use any other substances recreationally? No     Social History     Tobacco Use    Smoking status: Never    Smokeless tobacco: Never   Substance Use Topics    Alcohol use: Yes     Alcohol/week: 0.0 standard drinks of alcohol     Comment: 2 glasses wine day    Drug use: No           3/28/2024   AAA Screening   Family history of Abdominal Aortic Aneurysm (AAA)? No   ASCVD Risk   The 10-year ASCVD risk score (Gena ROBERTS, et al., 2019) is: 18.3%    Values used to calculate the score:      Age: 74 years      Sex: Male      Is Non- : No      Diabetic: No      Tobacco smoker: No      Systolic Blood Pressure: 126 mmHg      Is BP treated: Yes      HDL Cholesterol: 91 mg/dL      Total Cholesterol: 160 mg/dL            Reviewed and updated as needed this visit by Provider                      Current providers sharing in care for this patient include:  Patient Care Team:  Garrett Sotelo MD as PCP - General (Internal Medicine)  Garrett Sotelo MD as Assigned PCP    The following health maintenance items are reviewed in Epic and correct as of today:  Health Maintenance   Topic Date Due    ASTHMA ACTION PLAN  Never done    RSV VACCINE (Pregnancy & 60+) (1 - 1-dose 60+ series) Never done    HEPATITIS B IMMUNIZATION (2 of 3 - Risk 3-dose series) 03/27/2014    HEPATITIS A IMMUNIZATION (2 of 2 - Risk 2-dose series) 03/29/2015    DTAP/TDAP/TD IMMUNIZATION (2 - Td or Tdap) 03/22/2022    MEDICARE ANNUAL WELLNESS VISIT  08/17/2022    ANNUAL REVIEW OF HM ORDERS  07/13/2024    ASTHMA CONTROL TEST  09/28/2024    LIPID  12/21/2024    FALL RISK ASSESSMENT  03/28/2025    GLUCOSE  04/26/2025    ADVANCE CARE PLANNING  08/17/2026    COLORECTAL CANCER SCREENING  07/20/2030    HEPATITIS C  "SCREENING  Completed    PHQ-2 (once per calendar year)  Completed    INFLUENZA VACCINE  Completed    Pneumococcal Vaccine: 65+ Years  Completed    ZOSTER IMMUNIZATION  Completed    COVID-19 Vaccine  Completed    IPV IMMUNIZATION  Aged Out    HPV IMMUNIZATION  Aged Out    MENINGITIS IMMUNIZATION  Aged Out    RSV MONOCLONAL ANTIBODY  Aged Out         Review of Systems  CONSTITUTIONAL: NEGATIVE for fever, chills, change in weight  ENT/MOUTH: NEGATIVE for ear, mouth and throat problems  RESP: stable MADDEN  CV: NEGATIVE for chest pain, palpitations or peripheral edema  HEME/ALLERGY/IMMUNE: NEGATIVE for bleeding problems     Objective    Exam  /68 (BP Location: Right arm, Patient Position: Chair, Cuff Size: Adult Regular)   Pulse 90   Temp 97.3  F (36.3  C) (Oral)   Resp 24   Ht 1.956 m (6' 5\")   Wt 99.3 kg (219 lb)   SpO2 95%   BMI 25.97 kg/m     Estimated body mass index is 25.97 kg/m  as calculated from the following:    Height as of this encounter: 1.956 m (6' 5\").    Weight as of this encounter: 99.3 kg (219 lb).    Physical Exam  Constitutional:       Appearance: Normal appearance.   HENT:      Head: Atraumatic.      Right Ear: Tympanic membrane normal.      Left Ear: Tympanic membrane normal.      Nose: Nose normal.      Mouth/Throat:      Mouth: Mucous membranes are moist.   Eyes:      Pupils: Pupils are equal, round, and reactive to light.   Cardiovascular:      Rate and Rhythm: Normal rate and regular rhythm.      Heart sounds: Normal heart sounds.   Pulmonary:      Effort: Pulmonary effort is normal.      Breath sounds: Normal breath sounds.   Abdominal:      General: Bowel sounds are normal.      Palpations: Abdomen is soft.   Musculoskeletal:      Cervical back: Neck supple.      Right lower leg: No edema.      Left lower leg: No edema.   Skin:     General: Skin is warm and dry.   Neurological:      General: No focal deficit present.      Mental Status: He is alert.   Psychiatric:         Mood and " Affect: Mood normal.               3/28/2024   Mini Cog   Clock Draw Score 2 Normal   3 Item Recall 3 objects recalled   Mini Cog Total Score 5              Signed Electronically by: José Miguel Aguilar MD

## 2024-03-29 PROBLEM — J00 CORYZA: Status: ACTIVE | Noted: 2024-03-29

## 2024-03-29 PROBLEM — G62.9 NEUROPATHY: Status: ACTIVE | Noted: 2024-03-29

## 2024-03-29 PROBLEM — Z23 NEED FOR PROPHYLACTIC VACCINATION AGAINST HEPATITIS B VIRUS: Status: ACTIVE | Noted: 2024-03-29

## 2024-03-29 PROBLEM — Z23 NEED FOR TDAP VACCINATION: Status: ACTIVE | Noted: 2024-03-29

## 2024-03-29 PROBLEM — Z23 NEED FOR PROPHYLACTIC VACCINATION AGAINST HEPATITIS A: Status: ACTIVE | Noted: 2024-03-29

## 2024-03-29 PROBLEM — R47.89 OTHER SPEECH DISTURBANCES: Status: ACTIVE | Noted: 2024-03-29

## 2024-03-29 PROBLEM — Z29.11 NEED FOR VACCINATION AGAINST RESPIRATORY SYNCYTIAL VIRUS: Status: ACTIVE | Noted: 2024-03-29

## 2024-03-29 PROBLEM — R47.89 OTHER SPEECH DISTURBANCES: Status: RESOLVED | Noted: 2024-03-29 | Resolved: 2024-03-29

## 2024-03-29 LAB
FOLATE SERPL-MCNC: 26.4 NG/ML (ref 4.6–34.8)
T PALLIDUM AB SER QL: NONREACTIVE
TSH SERPL DL<=0.005 MIU/L-ACNC: 0.96 UIU/ML (ref 0.3–4.2)
VIT B12 SERPL-MCNC: 665 PG/ML (ref 232–1245)

## 2024-03-29 NOTE — ASSESSMENT & PLAN NOTE
He is beyond the recommended age for universal coverage, but avoiding additional hepatic insult may have benefit he is low risk

## 2024-03-29 NOTE — ASSESSMENT & PLAN NOTE
Recommended at pharmacy.  He thinks he has already had this.  Request records of immunizations given at [facility]

## 2024-03-29 NOTE — ASSESSMENT & PLAN NOTE
All previous therapies have failed.  ENT said there was nothing they could do.  Discussed available nasal therapies and their optimal usage.retrial

## 2024-03-29 NOTE — ASSESSMENT & PLAN NOTE
He reports that he underwent video stroboscopy for what sounds like laryngeal spasm.  Amitriptyline was immediately and continues to be effective.  Continue, request records

## 2024-03-29 NOTE — ASSESSMENT & PLAN NOTE
He reports longstanding hypoesthetic feet with occasional but rare pain.  Broaden database for his likely peripheral neuropathy

## 2024-04-12 NOTE — TELEPHONE ENCOUNTER
Pt called back/ says he stopped taking med and side effects stopped but BP went up/  Went back on med and side effects began again/ would like to discuss new med/ he is out of town until next week but would like a call back in meantime    PH.  Keith   2.08 2.08 2.08

## 2024-04-19 ENCOUNTER — TRANSFERRED RECORDS (OUTPATIENT)
Dept: HEALTH INFORMATION MANAGEMENT | Facility: CLINIC | Age: 74
End: 2024-04-19
Payer: MEDICARE

## 2024-04-19 LAB
ALT SERPL-CCNC: 41 IU/L (ref 0–44)
AST SERPL-CCNC: 48 IU/L (ref 0–40)
CREATININE (EXTERNAL): 0.61 MG/DL (ref 0.76–1.27)
GFR ESTIMATED (EXTERNAL): 101 ML/MIN/1.73
INR (EXTERNAL): 1 (ref 0.9–1.2)

## 2024-06-11 ENCOUNTER — TELEPHONE (OUTPATIENT)
Dept: FAMILY MEDICINE | Facility: CLINIC | Age: 74
End: 2024-06-11
Payer: MEDICARE

## 2024-06-11 NOTE — TELEPHONE ENCOUNTER
Patient saw commercial on TV regarding Beta Blockers that said these types of medications can cause difficulty breathing.    Patient reports he has been having SOB for years and has been taking atenolol for 3 years. Per patient, [facility] is aware of this issue.     Patient is asking if PCP thinks there could be a relation, and if there is another option patient could try for BP control besides beta blockers.         Can we leave a detailed message on this number? YES  Phone number patient can be reached at: Cell number on file:    Telephone Information:   Mobile [phone]       Raine Mariano, RN  [facility] Triage

## 2024-06-12 NOTE — TELEPHONE ENCOUNTER
Attempt #1: Left Message    Writer left message for patient requesting that they return call to discuss message below to schedule appointment for below concerns~

## 2024-06-13 NOTE — TELEPHONE ENCOUNTER
Pt called back, writer scheduled virtual    Appointments in Next Year      Jul 08, 2024  4:00 PM  Provider Visit with Garrett Sotelo MD  [facility] ([facility] [place] ) [phone]          CARMINE SPEARS RN on 6/13/2024 at 2:44 PM

## 2024-07-08 ENCOUNTER — VIRTUAL VISIT (OUTPATIENT)
Dept: FAMILY MEDICINE | Facility: CLINIC | Age: 74
End: 2024-07-08
Payer: MEDICARE

## 2024-07-08 DIAGNOSIS — I10 ESSENTIAL HYPERTENSION: Primary | ICD-10-CM

## 2024-07-08 DIAGNOSIS — I10 ESSENTIAL HYPERTENSION: ICD-10-CM

## 2024-07-08 PROCEDURE — 99442 PR PHYSICIAN TELEPHONE EVALUATION 11-20 MIN: CPT | Mod: 93 | Performed by: INTERNAL MEDICINE

## 2024-07-08 RX ORDER — ATENOLOL 25 MG/1
50 TABLET ORAL 2 TIMES DAILY
Qty: 180 TABLET | Refills: 3 | Status: SHIPPED | OUTPATIENT
Start: 2024-07-08 | End: 2024-07-09

## 2024-07-08 NOTE — PROGRESS NOTES
Alexandro is a 74 year old who is being evaluated via a billable telephone visit.    What phone number would you like to be contacted at? [phone]  How would you like to obtain your AVS? Mail a copy  Originating Location (pt. Location): Home    Distant Location (provider location):  On-site    Assessment & Plan     Essential hypertension  I think it would be reasonable to try to taper off of atenolol, although given the side effects that he described with a rapid taper, it seems that he may be having some rebound symptoms from a rapid taper and so I recommended a slower pace taper.  I recommended starting with 50 mg at night and 37.5 mg in the morning for 1 week and then decreasing the dose by 12.5 mg/day every week over 8 weeks until he has tapered off of atenolol completely.  He will continue to monitor his home blood pressure readings and inform us if his blood pressure elevates above our goal of that less than 130/80 upon tapering the atenolol.  We plan to schedule virtual follow-up at the 2-month interval to to check on his blood pressure and see how he is feeling off of the atenolol.  He verbalized understanding of the plan.  Unfortunately, he has had side effects and toxicities from multiple antihypertensive agents in the past including ACE inhibitor's and angiotensin receptor blockers as well as thiazide diuretics.  Therefore, he may need to use a loop diuretic or mineralocorticoid receptor antagonist to control blood pressure if blood pressure elevates off of the atenolol or we could consider an alpha-blocker such as clonidine patch  - atenolol (TENORMIN) 25 MG tablet; Take 2 tablets (50 mg) by mouth 2 times daily Or as directed by clinic            FUTURE APPOINTMENTS:       - Follow-up for annual visit or as needed    Subjective   Alexandro is a 74 year old, presenting for the following health issues:  Recheck Medication    History of Present Illness       Reason for visit:  Shortness of breath and possible  cause by beta blocker    He eats 2-3 servings of fruits and vegetables daily.He consumes 0 sweetened beverage(s) daily.He exercises with enough effort to increase his heart rate 9 or less minutes per day.  He exercises with enough effort to increase his heart rate 3 or less days per week.   He is taking medications regularly.       Concerned that atenolol is causing side effects including bronchospasm, lightheadedness  He has also read that beta-blockers are going out of favor for blood pressure management in the absence of arrhythmia  He would like to try to taper off of atenolol  He decrease his dose on his own from 50 mg twice daily to 25 mg twice daily and experienced some pounding in his chest without pain at the end of the 12-hour interval between doses  As such, he went back to the previous dose of 50 mg twice daily          Objective           Vitals:  No vitals were obtained today due to virtual visit.    Physical Exam   General: Alert and no distress //Respiratory: No audible wheeze, cough, or shortness of breath // Psychiatric:  Appropriate affect, tone, and pace of words            Phone call duration: 11 minutes  Signed Electronically by: Garrett Sotelo MD

## 2024-07-09 RX ORDER — ATENOLOL 25 MG/1
TABLET ORAL
Qty: 360 TABLET | Refills: 2 | Status: SHIPPED | OUTPATIENT
Start: 2024-07-09 | End: 2024-09-26

## 2024-09-09 ENCOUNTER — VIRTUAL VISIT (OUTPATIENT)
Dept: FAMILY MEDICINE | Facility: CLINIC | Age: 74
End: 2024-09-09
Attending: INTERNAL MEDICINE
Payer: MEDICARE

## 2024-09-09 ENCOUNTER — ORDERS ONLY (AUTO-RELEASED) (OUTPATIENT)
Dept: FAMILY MEDICINE | Facility: CLINIC | Age: 74
End: 2024-09-09
Payer: MEDICARE

## 2024-09-09 DIAGNOSIS — R00.0 TACHYCARDIA: ICD-10-CM

## 2024-09-09 DIAGNOSIS — I10 ESSENTIAL HYPERTENSION: ICD-10-CM

## 2024-09-09 DIAGNOSIS — R00.0 TACHYCARDIA: Primary | ICD-10-CM

## 2024-09-09 PROCEDURE — 99442 PR PHYSICIAN TELEPHONE EVALUATION 11-20 MIN: CPT | Mod: 93 | Performed by: INTERNAL MEDICINE

## 2024-09-09 RX ORDER — DILTIAZEM HYDROCHLORIDE 120 MG/1
120 CAPSULE, EXTENDED RELEASE ORAL DAILY
Qty: 90 CAPSULE | Refills: 3 | Status: SHIPPED | OUTPATIENT
Start: 2024-09-09 | End: 2024-09-26

## 2024-09-09 ASSESSMENT — ASTHMA QUESTIONNAIRES
QUESTION_1 LAST FOUR WEEKS HOW MUCH OF THE TIME DID YOUR ASTHMA KEEP YOU FROM GETTING AS MUCH DONE AT WORK, SCHOOL OR AT HOME: NONE OF THE TIME
ACT_TOTALSCORE: 21
QUESTION_3 LAST FOUR WEEKS HOW OFTEN DID YOUR ASTHMA SYMPTOMS (WHEEZING, COUGHING, SHORTNESS OF BREATH, CHEST TIGHTNESS OR PAIN) WAKE YOU UP AT NIGHT OR EARLIER THAN USUAL IN THE MORNING: NOT AT ALL
QUESTION_4 LAST FOUR WEEKS HOW OFTEN HAVE YOU USED YOUR RESCUE INHALER OR NEBULIZER MEDICATION (SUCH AS ALBUTEROL): NOT AT ALL
QUESTION_2 LAST FOUR WEEKS HOW OFTEN HAVE YOU HAD SHORTNESS OF BREATH: ONCE A DAY
ACT_TOTALSCORE: 21
QUESTION_5 LAST FOUR WEEKS HOW WOULD YOU RATE YOUR ASTHMA CONTROL: WELL CONTROLLED

## 2024-09-09 NOTE — PROGRESS NOTES
Alexandro is a 74 year old who is being evaluated via a billable telephone visit.    What phone number would you like to be contacted at? [phone]  How would you like to obtain your AVS? Mail a copy  Originating Location (pt. Location): Home    Distant Location (provider location):  On-site    Assessment & Plan     Tachycardia  Check Holter monitor to make sure there is not an underlying arrhythmia  - ZIO PATCH MAIL OUT; Future    Essential hypertension  Blood pressure is a little higher than ideal and he does not like that his heart rate is elevated, therefore and lieu of atenolol at start diltiazem as below, common side effects and risks discussed, virtual follow-up in 14 days to assess therapy and adjust dose if needed  - diltiazem ER (DILT-XR) 120 MG 24 hr capsule; Take 1 capsule (120 mg) by mouth daily.            FUTURE APPOINTMENTS:       -See above    Subjective   Alexandro is a 74 year old, presenting for the following health issues:  Follow Up    History of Present Illness       Hypertension: He presents for follow up of hypertension.  He does check blood pressure  regularly outside of the clinic. Outpatient blood pressures have not been over 140/90. He does not follow a low salt diet.       Completed slow taper off of atenolol but notices that his heart rate is elevated since doing so, this has limited his activities, no chest pains, respiratory symptoms have improved since getting off of atenolol and he feels less lightheaded, blood pressures have been in the 130s over 80s      Objective    Vitals - Patient Reported  Systolic (Patient Reported): 132  Diastolic (Patient Reported): 73  Pulse (Patient Reported): 93      Vitals:  No vitals were obtained today due to virtual visit.    Physical Exam   General: Alert and no distress //Respiratory: No audible wheeze, cough, or shortness of breath // Psychiatric:  Appropriate affect, tone, and pace of words            Phone call duration: 11 minutes  Signed  Electronically by: Garrett Sotelo MD

## 2024-09-16 ENCOUNTER — NURSE TRIAGE (OUTPATIENT)
Dept: FAMILY MEDICINE | Facility: CLINIC | Age: 74
End: 2024-09-16
Payer: MEDICARE

## 2024-09-16 NOTE — TELEPHONE ENCOUNTER
"Nurse Triage SBAR    Is this a 2nd Level Triage? YES, LICENSED PRACTITIONER REVIEW IS REQUIRED    Situation: Patient has been experiencing rapid heart beat 100-120. Patient was symptomatic describes \"no feeling good\", thinks new heart medication to blame. No chest pain, no SOB, no sweating. Trouble sleeping    Background: no heart history but has high BP    Assessment: Patient needs to be seen    Protocol Recommended Disposition:   Callback by PCP Today    Recommendation: send to PCP    Routed to provider    Does the patient meet one of the following criteria for ADS visit consideration? No    Reason for Disposition   Heart rhythm alert (e.g., 'you have irregular heartbeat') from personal wearable device (e.g., Apple Watch)    Additional Information   Negative: Passed out (i.e., lost consciousness, collapsed and was not responding)   Negative: Shock suspected (e.g., cold/pale/clammy skin, too weak to stand, low BP, rapid pulse)   Negative: Difficult to awaken or acting confused (e.g., disoriented, slurred speech)   Negative: Visible sweat on face or sweat dripping down face   Negative: Unable to walk, or can only walk with assistance (e.g., requires support)   Negative: Received SHOCK from implantable cardiac defibrillator and has persisting symptoms (i.e., palpitations, lightheadedness)   Negative: Dizziness, lightheadedness, or weakness and heart beating very rapidly (e.g., > 140 / minute)   Negative: Dizziness, lightheadedness, or weakness and heart beating very slowly (e.g., < 50 / minute)   Negative: Sounds like a life-threatening emergency to the triager   Negative: Chest pain   Negative: Difficulty breathing   Negative: Dizziness, lightheadedness, or weakness   Negative: Heart beating very rapidly (e.g., > 140 / minute) and present now  (Exception: During exercise.)   Negative: Heart beating very slowly (e.g., < 50 / minute)  (Exception: Athlete and heart rate normal for caller.)   Negative: New or worsened " "shortness of breath with activity (dyspnea on exertion)   Negative: Patient sounds very sick or weak to the triager   Negative: Wearing a 'Holter monitor' or 'cardiac event monitor'   Negative: Received SHOCK from implantable cardiac defibrillator (and now feels well)   Negative: Heart beating very rapidly (e.g., > 140 / minute) and not present now  (Exception: During exercise.)   Negative: Skipped or extra beat(s) and increases with exercise or exertion   Negative: Skipped or extra beat(s) and occurs 4 or more times per minute   Negative: History of heart disease (i.e., heart attack, bypass surgery, angina, angioplasty)  (Exception: Brief heartbeat symptoms that went away and now feels well.)   Negative: Age > 60 years  (Exception: Brief heartbeat symptoms that went away and now feels well.)   Negative: Taking water pill (i.e., diuretic) or heart medication (e.g., digoxin)   Negative: Patient wants to be seen    Answer Assessment - Initial Assessment Questions  1. DESCRIPTION: \"Please describe your heart rate or heartbeat that you are having\" (e.g., fast/slow, regular/irregular, skipped or extra beats, \"palpitations\")      Regular - fast 100-120  2. ONSET: \"When did it start?\" (Minutes, hours or days)       Noon plus an hour or 2   3. DURATION: \"How long does it last\" (e.g., seconds, minutes, hours)     4 am   4. PATTERN \"Does it come and go, or has it been constant since it started?\"  \"Does it get worse with exertion?\"   \"Are you feeling it now?\"      constantt  5. TAP: \"Using your hand, can you tap out what you are feeling on a chair or table in ront of you, so that I can hear?\" (Note: not all patients can do this)        No  6. HEART RATE: \"Can you tell me your heart rate?\" \"How many beats in 15 seconds?\"  (Note: not all patients can do this)    100-120     7. RECURRENT SYMPTOM: \"Have you ever had this before?\" If Yes, ask: \"When was the last time?\" and \"What happened that time?\"       no  8. CAUSE: \"What do you " "think is causing the palpitations?\"      Medicine   9. CARDIAC HISTORY: \"Do you have any history of heart disease?\" (e.g., heart attack, angina, bypass surgery, angioplasty, arrhythmia)       High bp  10. OTHER SYMPTOMS: \"Do you have any other symptoms?\" (e.g., dizziness, chest pain, sweating, difficulty breathing)        Felt terrible  11. PREGNANCY: \"Is there any chance you are pregnant?\" \"When was your last menstrual period?\"        no    Protocols used: Heart Rate and Heartbeat [id number]    "

## 2024-09-16 NOTE — TELEPHONE ENCOUNTER
Recommend ER, needs EKG today to see if he has atrial fibrillation or other acutely dangerous tachycardic arrhythmia

## 2024-09-16 NOTE — TELEPHONE ENCOUNTER
Triage Patient Outreach    Attempt # 1    Was call answered?  No.  Left detailed message for patient.  Pt to call back if further questions.  Advised pt to go to ER.    Cayla Dudley RN

## 2024-09-16 NOTE — TELEPHONE ENCOUNTER
Patient wondering if he should start the zio patch? Nurse advised him to continue as it would record his heart and allow for more information regarding his heart activity.     TONNY Fremean  [facility]

## 2024-09-17 NOTE — TELEPHONE ENCOUNTER
Patient Contact     Attempt # 2     Was call answered?  No.  Left message on voicemail with information to call triage back and that provider recommended to go to the ER.      Alok Mayers RN  [facility]

## 2024-09-26 ENCOUNTER — VIRTUAL VISIT (OUTPATIENT)
Dept: FAMILY MEDICINE | Facility: CLINIC | Age: 74
End: 2024-09-26
Payer: MEDICARE

## 2024-09-26 DIAGNOSIS — I10 ESSENTIAL HYPERTENSION: ICD-10-CM

## 2024-09-26 DIAGNOSIS — R00.0 SINUS TACHYCARDIA: Primary | ICD-10-CM

## 2024-09-26 PROCEDURE — 93244 EXT ECG>48HR<7D REV&INTERPJ: CPT | Performed by: INTERNAL MEDICINE

## 2024-09-26 PROCEDURE — 99442 PR PHYSICIAN TELEPHONE EVALUATION 11-20 MIN: CPT | Mod: 93 | Performed by: INTERNAL MEDICINE

## 2024-09-26 RX ORDER — ATENOLOL 25 MG/1
25 TABLET ORAL DAILY
Qty: 90 TABLET | Refills: 3 | Status: SHIPPED | OUTPATIENT
Start: 2024-09-26

## 2024-09-26 NOTE — PROGRESS NOTES
Alexandro is a 74 year old who is being evaluated via a billable telephone visit.    How would you like to obtain your AVS? Dilciahart  If the video visit is dropped, the invitation should be resent by: Text to cell phone: [phone]  Will anyone else be joining your video visit? No  Originating Location (pt. Location): Home    Distant Location (provider location):  On-site    Assessment & Plan     Sinus tachycardia  Unclear etiology, could be rebound tachycardia from stopping beta-blocker although it has been several weeks since he has been off of the slow taper of atenolol, I think we should exclude thyrotoxicosis and anemia and I asked him to schedule lab appointment at the clinic near him to get blood tested for that purpose, he is on 2 vasodilators which could contribute to tachycardia and we discussed this, in the end, we decided to split the difference and start a low-dose of atenolol to see if he can tolerate 12 and half milligrams of atenolol without undue lightheadedness and to see if this improves the episodes of tachycardia.  If 12.5 mg does not slow the heart down enough, he could further increase to 25 mg if lightheadedness is not a problem.  He verbalized understanding of these instructions.  He will schedule lab appointment as soon as possible for the below tests.  - Hemoglobin; Future  - TSH with free T4 reflex; Future    Essential hypertension  See discussion above  - atenolol (TENORMIN) 25 MG tablet; Take 1 tablet (25 mg) by mouth daily.        FUTURE APPOINTMENTS:       -I informed him that he is overdue for a preventive exam with me, but he informed me that he had a preventive exam with another provider in March.  Therefore, he should return to see me in March for a preventive exam provided that the above plan addresses the symptoms.  However, he was informed to contact us sooner if he still struggling with lightheadedness or episodes of fast heart rate despite the above medication  changes.    Subjective   Alexandro is a 74 year old, presenting for the following health issues:  Follow Up, Recheck Medication, and Results (Zio results)        9/26/2024     4:26 PM   Additional Questions   Roomed by Jamil   Accompanied by Not applicable, by themselves     History of Present Illness   He consumes 0 sweetened beverage(s) daily. He exercises with enough effort to increase his heart rate 3 or less days per week.   He is taking medications regularly.     Alexandro stopped taking diltiazem  His symptoms improved although he did have a spell of fast heart rate while wearing the Zio patch  The underlying rhythm was sinus tachycardia and the heart rate was 132 bpm  His average heart rate was 97 bpm during the monitoring period  There were no arrhythmias noted      Objective           Vitals:  No vitals were obtained today due to virtual visit.    Physical Exam   General: Alert and no distress //Respiratory: No audible wheeze, cough, or shortness of breath // Psychiatric:  Appropriate affect, tone, and pace of words            Phone call duration: 11:05 minutes  Signed Electronically by: Garrett Sotelo MD

## 2024-09-26 NOTE — TELEPHONE ENCOUNTER
I actually spoke to Alexandro later in the day on 9/16  He feels fine  His heart rate was normal when we spoke  He does not like the way the dilitiazem makes him feel good in a non-specific  So we decided to stop diltiazem and check the ZIO off diltiazem to make sure rate is OK off atenolol   He was to reach out if symptoms did not improve off diltiazem

## 2025-04-05 DIAGNOSIS — I10 ESSENTIAL HYPERTENSION: ICD-10-CM

## 2025-04-05 NOTE — TELEPHONE ENCOUNTER
Medication Question or Refill        What medication are you calling about (include dose and sig)?: atenolol (TENORMIN) 25 MG tablet     Preferred Pharmacy:       [id number] - [place], [place] - [address]  [address]  [id number]  [address] [facility] [address]  Phone: [phone] Fax: [phone]        Controlled Substance Agreement on file:   CSA -- Patient Level:    CSA: None found at the patient level.       Who prescribed the medication?: PCP    Do you need a refill? Yes    When did you use the medication last? 4/4    Patient offered an appointment? No    Do you have any questions or concerns?  No      Okay to leave a detailed message?: Yes at Cell number on file:    Telephone Information:   Mobile [phone]

## 2025-04-07 RX ORDER — ATENOLOL 25 MG/1
25 TABLET ORAL DAILY
Qty: 90 TABLET | Refills: 1 | Status: SHIPPED | OUTPATIENT
Start: 2025-04-07

## 2025-05-01 ENCOUNTER — ANCILLARY PROCEDURE (OUTPATIENT)
Dept: GENERAL RADIOLOGY | Facility: CLINIC | Age: 75
End: 2025-05-01
Payer: MEDICARE

## 2025-05-01 ENCOUNTER — OFFICE VISIT (OUTPATIENT)
Dept: FAMILY MEDICINE | Facility: CLINIC | Age: 75
End: 2025-05-01
Payer: MEDICARE

## 2025-05-01 VITALS
TEMPERATURE: 97.9 F | SYSTOLIC BLOOD PRESSURE: 115 MMHG | WEIGHT: 207 LBS | RESPIRATION RATE: 14 BRPM | HEART RATE: 90 BPM | BODY MASS INDEX: 24.55 KG/M2 | OXYGEN SATURATION: 94 % | DIASTOLIC BLOOD PRESSURE: 69 MMHG

## 2025-05-01 DIAGNOSIS — I10 ESSENTIAL HYPERTENSION: ICD-10-CM

## 2025-05-01 DIAGNOSIS — R06.02 SOB (SHORTNESS OF BREATH): Primary | ICD-10-CM

## 2025-05-01 DIAGNOSIS — E78.5 HYPERLIPIDEMIA LDL GOAL <100: ICD-10-CM

## 2025-05-01 DIAGNOSIS — I10 BENIGN ESSENTIAL HYPERTENSION: ICD-10-CM

## 2025-05-01 DIAGNOSIS — J45.41 MODERATE PERSISTENT ASTHMA WITH EXACERBATION: ICD-10-CM

## 2025-05-01 DIAGNOSIS — R06.02 SOB (SHORTNESS OF BREATH): ICD-10-CM

## 2025-05-01 RX ORDER — PREDNISONE 20 MG/1
40 TABLET ORAL DAILY
Qty: 10 TABLET | Refills: 0 | Status: SHIPPED | OUTPATIENT
Start: 2025-05-01 | End: 2025-05-06

## 2025-05-01 RX ORDER — ALBUTEROL SULFATE 0.83 MG/ML
2.5 SOLUTION RESPIRATORY (INHALATION) EVERY 6 HOURS PRN
Qty: 90 ML | Refills: 0 | Status: SHIPPED | OUTPATIENT
Start: 2025-05-01

## 2025-05-01 RX ORDER — IPRATROPIUM BROMIDE AND ALBUTEROL SULFATE 2.5; .5 MG/3ML; MG/3ML
3 SOLUTION RESPIRATORY (INHALATION) ONCE
Status: COMPLETED | OUTPATIENT
Start: 2025-05-01 | End: 2025-05-01

## 2025-05-01 RX ORDER — AZITHROMYCIN 250 MG/1
TABLET, FILM COATED ORAL
Qty: 6 TABLET | Refills: 0 | Status: SHIPPED | OUTPATIENT
Start: 2025-05-01 | End: 2025-05-06

## 2025-05-01 RX ADMIN — IPRATROPIUM BROMIDE AND ALBUTEROL SULFATE 3 ML: 2.5; .5 SOLUTION RESPIRATORY (INHALATION) at 13:59

## 2025-05-01 SDOH — HEALTH STABILITY: PHYSICAL HEALTH: ON AVERAGE, HOW MANY DAYS PER WEEK DO YOU ENGAGE IN MODERATE TO STRENUOUS EXERCISE (LIKE A BRISK WALK)?: 0 DAYS

## 2025-05-01 ASSESSMENT — SOCIAL DETERMINANTS OF HEALTH (SDOH): HOW OFTEN DO YOU GET TOGETHER WITH FRIENDS OR RELATIVES?: ONCE A WEEK

## 2025-05-01 NOTE — PROGRESS NOTES
Assessment & Plan     SOB (shortness of breath)  Moderate persistent asthma with exacerbation  CXR clear. Duoneb given in clinic due to low oxygen (90%) with good response - improved SOB and increase in oxygen (94%). Recommend scheduled nebs till symptoms start to improve. Will also give prednisone for asthma exacerbation. Recommend follow up in one week or sooner if needed.  - XR Chest 2 Views; Future  - ipratropium - albuterol 0.5 mg/2.5 mg/3 mL (DUONEB) neb solution 3 mL  - albuterol (PROVENTIL) (2.5 MG/3ML) 0.083% neb solution; Take 1 vial (2.5 mg) by nebulization every 6 hours as needed for shortness of breath, wheezing or cough.  - Nebulizer and Supplies Order for DME - ONLY FOR DME  - azithromycin (ZITHROMAX) 250 MG tablet; Take 2 tablets (500 mg) by mouth daily for 1 day, THEN 1 tablet (250 mg) daily for 4 days.  - predniSONE (DELTASONE) 20 MG tablet; Take 2 tablets (40 mg) by mouth daily for 5 days.    Benign essential hypertension  Essential hypertension  Pt would like med review - recommend MTM visit to discuss medication concerns further  - Med Therapy Management Referral  - Comprehensive metabolic panel (BMP + Alb, Alk Phos, ALT, AST, Total. Bili, TP); Future  - Comprehensive metabolic panel (BMP + Alb, Alk Phos, ALT, AST, Total. Bili, TP)    Hyperlipidemia LDL goal <100  - Lipid panel reflex to direct LDL Fasting; Future  - Lipid panel reflex to direct LDL Fasting    Recommend considering [place] for his continued sinus symptoms despite seeing 3 ENTs            Jaiden Mc is a 75 year old, presenting for the following:  Annual Visit and Chronic Cough (Chronic sinus problem, SOB- worsening./)        5/1/2025    12:50 PM   Additional Questions   Roomed by Lanie DUBON     Concern - SOB and cough     Onset: Chronic problem, over 1 year  Description: Productive cough- clear/green phlegm   Requesting lab work   Progression of Symptoms:  worsening    Chronic cough and SOB  Ok when sleeping or  "lying down    - Sinus Drainage and Coughing: Experiences sinus drainage upon waking, leading to coughing. Coughing worsens when going to the bathroom. Sinus pain present, duration not specified.  Saw 3 different ENT specialists in the past with reportedly normal scans and scopes     - Shortness of Breath (SOB): Reports shortness of breath, worsening over time. Has not used albuterol inhaler for a long time, no recent asthma attacks. Moved Trelegy inhaler use to the evening to reduce coughing.    Last saw pulmonology a year ago, has visit in July    Wondering if any of his medications could be contributing                Objective    Exam  /69 (BP Location: Right arm, Patient Position: Sitting, Cuff Size: Adult Regular)   Pulse 101   Temp 97.9  F (36.6  C) (Temporal)   Resp 14   Wt 93.9 kg (207 lb)   SpO2 (!) 90%   BMI 24.55 kg/m     Estimated body mass index is 24.55 kg/m  as calculated from the following:    Height as of 3/28/24: 1.956 m (6' 5\").    Weight as of this encounter: 93.9 kg (207 lb).    Physical Exam  GENERAL: alert and no distress, appears fatigued  EYES: Eyes slightly erythematous  HENT: ear canals and TM's normal, nose and mouth without ulcers or lesions  RESP: diminished right lung sounds. No wheezing or crackles. Increased work of breathing   CV: regular rate and rhythm, normal S1 S2, no S3 or S4, no murmur, click or rub, no peripheral edema  MS: no gross musculoskeletal defects noted, no edema  PSYCH: mentation appears normal      Signed Electronically by: LUCIE Salinas CNP    "

## 2025-05-01 NOTE — PROGRESS NOTES
Clinic Administered Medication Documentation    Patient was given Duoneb. Prior to medication administration, verified patient's identity using patient's name and date of birth.    Lizette Oneill, CMA

## 2025-05-01 NOTE — PATIENT INSTRUCTIONS
- take albuterol neb every 6 hours till you are feeling better  - take steroid   - take full course of antibiotics     Follow up in one week  Reach out sooner if needed

## 2025-05-05 ENCOUNTER — TELEPHONE (OUTPATIENT)
Dept: FAMILY MEDICINE | Facility: CLINIC | Age: 75
End: 2025-05-05
Payer: MEDICARE

## 2025-05-05 NOTE — TELEPHONE ENCOUNTER
MTM referral from: [facility] visit (referral by provider)    MTM referral outreach attempt #2 on May 5, 2025 at 10:11 AM      Outcome: Left Message    Use VBC for the carrier/Plan on the flowsheet      MTM Practitioner please send patient letter    Cherelle Still CMA  MTM

## 2025-05-05 NOTE — TELEPHONE ENCOUNTER
Attempt x 1.  Called pt and left a message to call back to [phone] and to ask to speak to a nurse.     When pt calls back,     Relay message from provider below. Relay message from provider below. Relay messages from both telephone encounters dated 5/5.     ----- Message from Rubi Jeffries sent at 5/4/2025 11:11 AM CDT -----  Please call pt with results      The testing of your blood sugar, kidney function, liver function and electrolytes was normal. AST is slightly elevated but has been in the past. I recommend continuing to monitor this     Cholesterol panel looks great      LUCIE Salinas, CNP     ---- Message from Rubi Jeffries sent at 5/2/2025  5:07 PM CDT -----  Please call patient     Let him know the XR is normal with no signs of infection. He could stop the antibiotic I sent. Can you see if his symptoms are improving with the scheduled nebs?     Thanks   LUCIE Salinas, CNP     Rosana ABRAMS RN   Clinic RN  [facility]

## 2025-05-05 NOTE — TELEPHONE ENCOUNTER
Attempt x 1. Called pt and left a message to call back to [phone] and to ask to speak to a nurse.     When pt calls back,     Relay message from provider below. Relay messages from both telephone encounters dated 5/5.     ----- Message from Rubi Jeffries sent at 5/4/2025 11:11 AM CDT -----  Please call pt with results      The testing of your blood sugar, kidney function, liver function and electrolytes was normal. AST is slightly elevated but has been in the past. I recommend continuing to monitor this     Cholesterol panel looks great      LUCIE Salinas, CNP     ---- Message from Rubi Jeffries sent at 5/2/2025  5:07 PM CDT -----  Please call patient     Let him know the XR is normal with no signs of infection. He could stop the antibiotic I sent. Can you see if his symptoms are improving with the scheduled nebs?     Thanks   LUCIE Salinas, CNP     Rosana ABRAMS RN   Clinic RN  [facility]

## 2025-05-05 NOTE — TELEPHONE ENCOUNTER
----- Message from Rubi Jeffries sent at 5/2/2025  5:07 PM CDT -----  Please call patient    Let him know the XR is normal with no signs of infection. He could stop the antibiotic I sent. Can you see if his symptoms are improving with the scheduled nebs?    Thanks   Rubi Jeffries, LUCIE, CNP

## 2025-05-05 NOTE — TELEPHONE ENCOUNTER
----- Message from Rubi Jeffries sent at 5/4/2025 11:11 AM CDT -----  Please call pt with results     The testing of your blood sugar, kidney function, liver function and electrolytes was normal. AST is slightly elevated but has been in the past. I recommend continuing to monitor this    Cholesterol panel looks great     LUCIE Salinas, CNP

## 2025-05-05 NOTE — LETTER
May 5, 2025      Alex[place] Lee  [address] [facility] [address]        Dear Alexandro,     LUCIE Salinas CNP has recommended you schedule a Medication Therapy Management (MTM) appointment. MTM is designed to help you get the most of out of your medicines.     During an MTM appointment a specially trained pharmacist will review all of your medicines, both prescription and over-the-counter. They will make sure your medicines are the best choice for you and are safe and convenient for you.  MTM pharmacists work together with you and your doctor to help you understand your medicines, solve any problems related to your medicines and help you get the best results from taking your medicines.     At [facility] ([facility] we strongly believe in a team approach to health care. We want to help you understand your medicines and health conditions. To learn more about how you might benefit from MTM services, check out the following website:    [email or web address]    To make an appointment, please call the clinic at [phone]  or the MTM scheduling line at [phone] (toll-free at [phone]).    We look forward to hearing from you!      Karla Lacey PharmD  Medication Therapy Management Pharmacy Resident  [facility]  [phone]    Giana Valentino PharmD, [facility]  Medication Therapy Management Provider  [facility]  [phone]

## 2025-05-05 NOTE — TELEPHONE ENCOUNTER
Sent referral letter.  Pushpa MccallD, [facility]  Medication Therapy Management Provider  [phone]

## 2025-05-06 NOTE — TELEPHONE ENCOUNTER
Attempt x 2 to call patient.  No answer,  left to return call back to clinic at  and ask to speak to a nurse.    Please ensure to complete both encounters dated 5/2 and 5/4.  Thank you.    Bela Cheney RN BSN  Clinic Nurse  [facility]

## 2025-05-06 NOTE — TELEPHONE ENCOUNTER
Attempt x 2 to call patient.  No answer,  left to return call back to clinic at  and ask to speak to a nurse.      Please ensure to complete both encounters dated 5/2 and 5/4.  Thank you.     Bela Cheney RN BSN  Clinic Nurse  [facility]

## 2025-05-07 NOTE — TELEPHONE ENCOUNTER
Called patient.  Advised of CXR result and lab results.  Patient is feeling better.  Took probiotic.  Finished antibiotic yesterday.  Patient does not have any questions or concerns at this time.  Sweetie Fletcher RN

## 2025-05-16 ENCOUNTER — TELEPHONE (OUTPATIENT)
Dept: FAMILY MEDICINE | Facility: CLINIC | Age: 75
End: 2025-05-16
Payer: MEDICARE

## 2025-05-16 NOTE — TELEPHONE ENCOUNTER
Prior Authorization Retail Medication Request    Medication/Dose:   albuterol (PROVENTIL) (2.5 MG/3ML) 0.083% neb solution     Diagnosis and ICD code (if different than what is on RX):    New/renewal/insurance change PA/secondary ins. PA:  Previously Tried and Failed:    Rationale:      Cruz: [id number]    Lenore Wang TC

## 2025-05-20 NOTE — TELEPHONE ENCOUNTER
PA Initiation    Medication: ALBUTEROL SULFATE (2.5 MG/3ML) 0.083% IN NEBU  Insurance Company: [employer] - Phone [phone] Fax [phone]  Pharmacy Filling the Rx: "[facility]" #[id number] - [place], [place] - [address]  Filling Pharmacy Phone: [phone]  Filling Pharmacy Fax:    Start Date: 5/20/2025  Retail Pharmacy Prior Authorization Team   Phone: [phone]

## 2025-05-20 NOTE — TELEPHONE ENCOUNTER
PRIOR AUTHORIZATION DENIED    Medication: ALBUTEROL SULFATE (2.5 MG/3ML) 0.083% IN NEBU  Insurance Company: [employer] - Phone [phone] Fax [phone]  Denial Date: 5/20/2025  Denial Reason(s):     Appeal Information: None available, must bill to Medicare Part B  Patient Notified:Instructed pharmacy to notify patient once order is ready.

## 2025-05-27 ENCOUNTER — OFFICE VISIT (OUTPATIENT)
Dept: FAMILY MEDICINE | Facility: CLINIC | Age: 75
End: 2025-05-27
Payer: MEDICARE

## 2025-05-27 VITALS
DIASTOLIC BLOOD PRESSURE: 72 MMHG | RESPIRATION RATE: 16 BRPM | TEMPERATURE: 97.4 F | HEIGHT: 77 IN | HEART RATE: 97 BPM | SYSTOLIC BLOOD PRESSURE: 124 MMHG | OXYGEN SATURATION: 95 % | WEIGHT: 196 LBS | BODY MASS INDEX: 23.14 KG/M2

## 2025-05-27 DIAGNOSIS — Z23 NEED FOR VACCINATION: ICD-10-CM

## 2025-05-27 DIAGNOSIS — J96.01 ACUTE RESPIRATORY FAILURE WITH HYPOXIA (H): Primary | ICD-10-CM

## 2025-05-27 DIAGNOSIS — I10 ESSENTIAL HYPERTENSION: ICD-10-CM

## 2025-05-27 DIAGNOSIS — J18.9 PNEUMONIA DUE TO INFECTIOUS ORGANISM, UNSPECIFIED LATERALITY, UNSPECIFIED PART OF LUNG: ICD-10-CM

## 2025-05-27 DIAGNOSIS — R73.9 HYPERGLYCEMIA: ICD-10-CM

## 2025-05-27 DIAGNOSIS — R00.0 SINUS TACHYCARDIA: ICD-10-CM

## 2025-05-27 DIAGNOSIS — R91.8 PULMONARY NODULES: ICD-10-CM

## 2025-05-27 DIAGNOSIS — R06.02 SOB (SHORTNESS OF BREATH): ICD-10-CM

## 2025-05-27 LAB
BASOPHILS # BLD AUTO: 0.1 10E3/UL (ref 0–0.2)
BASOPHILS NFR BLD AUTO: 1 %
EOSINOPHIL # BLD AUTO: 0.2 10E3/UL (ref 0–0.7)
EOSINOPHIL NFR BLD AUTO: 3 %
ERYTHROCYTE [DISTWIDTH] IN BLOOD BY AUTOMATED COUNT: 12.5 % (ref 10–15)
HCT VFR BLD AUTO: 50.2 % (ref 40–53)
HGB BLD-MCNC: 16.7 G/DL (ref 13.3–17.7)
IMM GRANULOCYTES # BLD: 0.1 10E3/UL
IMM GRANULOCYTES NFR BLD: 1 %
LYMPHOCYTES # BLD AUTO: 1.1 10E3/UL (ref 0.8–5.3)
LYMPHOCYTES NFR BLD AUTO: 13 %
MCH RBC QN AUTO: 30.4 PG (ref 26.5–33)
MCHC RBC AUTO-ENTMCNC: 33.3 G/DL (ref 31.5–36.5)
MCV RBC AUTO: 91 FL (ref 78–100)
MONOCYTES # BLD AUTO: 0.8 10E3/UL (ref 0–1.3)
MONOCYTES NFR BLD AUTO: 9 %
NEUTROPHILS # BLD AUTO: 6.4 10E3/UL (ref 1.6–8.3)
NEUTROPHILS NFR BLD AUTO: 74 %
PLATELET # BLD AUTO: 250 10E3/UL (ref 150–450)
RBC # BLD AUTO: 5.5 10E6/UL (ref 4.4–5.9)
WBC # BLD AUTO: 8.7 10E3/UL (ref 4–11)

## 2025-05-27 PROCEDURE — 85025 COMPLETE CBC W/AUTO DIFF WBC: CPT | Performed by: INTERNAL MEDICINE

## 2025-05-27 PROCEDURE — 91320 SARSCV2 VAC 30MCG TRS-SUC IM: CPT | Performed by: INTERNAL MEDICINE

## 2025-05-27 PROCEDURE — 1126F AMNT PAIN NOTED NONE PRSNT: CPT | Performed by: INTERNAL MEDICINE

## 2025-05-27 PROCEDURE — 36415 COLL VENOUS BLD VENIPUNCTURE: CPT | Performed by: INTERNAL MEDICINE

## 2025-05-27 PROCEDURE — 80053 COMPREHEN METABOLIC PANEL: CPT | Performed by: INTERNAL MEDICINE

## 2025-05-27 PROCEDURE — 84443 ASSAY THYROID STIM HORMONE: CPT | Performed by: INTERNAL MEDICINE

## 2025-05-27 PROCEDURE — 3078F DIAST BP <80 MM HG: CPT | Performed by: INTERNAL MEDICINE

## 2025-05-27 PROCEDURE — 3074F SYST BP LT 130 MM HG: CPT | Performed by: INTERNAL MEDICINE

## 2025-05-27 PROCEDURE — 90480 ADMN SARSCOV2 VAC 1/ONLY CMP: CPT | Performed by: INTERNAL MEDICINE

## 2025-05-27 PROCEDURE — 99214 OFFICE O/P EST MOD 30 MIN: CPT | Performed by: INTERNAL MEDICINE

## 2025-05-27 PROCEDURE — 83036 HEMOGLOBIN GLYCOSYLATED A1C: CPT | Performed by: INTERNAL MEDICINE

## 2025-05-27 RX ORDER — ALBUTEROL SULFATE 0.83 MG/ML
2.5 SOLUTION RESPIRATORY (INHALATION) EVERY 6 HOURS PRN
Qty: 90 ML | Refills: 3 | Status: SHIPPED | OUTPATIENT
Start: 2025-05-27

## 2025-05-27 RX ORDER — ATENOLOL 25 MG/1
TABLET ORAL
Qty: 135 TABLET | Refills: 3 | Status: SHIPPED | OUTPATIENT
Start: 2025-05-27

## 2025-05-27 ASSESSMENT — PAIN SCALES - GENERAL: PAINLEVEL_OUTOF10: NO PAIN (0)

## 2025-05-27 NOTE — PROGRESS NOTES
Assessment & Plan     Acute respiratory failure with hypoxia (H)    - CT Chest Pulmonary Embolism w Contrast; Future    Pneumonia due to infectious organism, unspecified laterality, unspecified part of lung    - CBC with platelets and differential; Future  - Comprehensive metabolic panel (BMP + Alb, Alk Phos, ALT, AST, Total. Bili, TP); Future    SOB (shortness of breath)    - albuterol (PROVENTIL) (2.5 MG/3ML) 0.083% neb solution; Take 1 vial (2.5 mg) by nebulization every 6 hours as needed for shortness of breath, wheezing or cough.    Essential hypertension    - atenolol (TENORMIN) 25 MG tablet; Take 25 mg in AM and 12.5 mg in PM    Need for vaccination    COVID shot today          Man presents in hospital follow-up after being hospitalized for 5 days in a [facility] hospital.  Unfortunately, I do not have the discharge summary.  It seems he was treated with antibiotics for suspected pneumonia although I do have a chest x-ray report that is characterized as negative chest.  He seems to feel better although he is on supplemental oxygen which is new for him.  He does continue to have significant breathlessness and a heart rate at the upper limit of normal.  I recommended a stat CT scan to exclude pulmonary embolus as a potential cause for his hypoxic respiratory failure and dyspnea.  I recommended rechecking lab test to make sure there is not a persistent white blood cell count elevation that might suggest partially treated pneumonia or need for additional antibiotics  I do not hear any wheezing on his lung exam today that would necessitate additional systemic steroids although it is unclear if he was treated with systemic steroids in [facility]  I did refill his nebulizer solution and changed his atenolol prescription to reflect his current dose  His blood pressure is under good control today in clinic  Regarding his hypoxia, I recommended continuing supplemental oxygen and short-term follow-up  to assess his oxygen needs  Also, he does have an appointment to see his pulmonologist coming up in a few weeks        MED REC REQUIRED  Post Medication Reconciliation Status: discharge medications reconciled and changed, per note/orders    Follow-up  Return in about 2 weeks (around 6/10/2025) for recheck.  Patient instructed to return to clinic or contact us sooner if symptoms worsen or new symptoms develop.     Jaiden Mc is a 75 year old, presenting for the following health issues:  Hospital F/U        5/27/2025     1:33 PM   Additional Questions   Roomed by Desiree   Accompanied by Valeria, wife     [facility]          Hospital Follow-up Visit:    Hospital/Nursing Home/IP Rehab Facility: [facility]  Date of Admission: 5-  Date of Discharge: 5-  Reason(s) for Admission: Asthma   Do you have any other stressors you would like to discuss with your provider? No    Problems taking medications regularly:  None  Medication changes since discharge: None  Problems adhering to non-medication therapy:  None    Summary of hospitalization: No discharge summary from [facility] available although patient discharge information indicates that he had a negative chest x-ray and that he completed a course of doxycycline and cefdinir  Diagnostic Tests/Treatments reviewed.  Follow up needed: Pulmonary medicine  Other Healthcare Providers Involved in Patient s Care:         None  Update since discharge: improved.         Plan of care communicated with patient and family             75-year-old man who has asthma/COPD overlap syndrome  He has been using Trelegy to manage this  He was treated for an exacerbation of asthma at the beginning of May  He was seen in follow-up and thought to be improving  He then caught an upper respiratory infection from [place] and was hospitalized for 5 days in [facility]  I have limited information from his hospitalization  "and he does not recall the details  He does not think he was seen by a lung doctor there  There is a report of a negative portable chest x-ray as well as a prescription for cefdinir and doxycycline  He recalls completing both medications  He was placed on home oxygen and remains on home oxygen  He has an oximeter and measured his blood oxygen off of oxygen at home and noted that it dips below 88%  He does feel less breathless than when he initially went to the hospital and he feels like his cough has improved and it is no longer productive of sputum  He denies current fevers but he thinks he had a fever at the onset of his illness  He denies chest pains or orthopnea, PND or edema  He is taking a whole atenolol tablet in the morning and a half atenolol tablet in the evening and needs a new prescription reflecting his current dosage          Objective    /72 (BP Location: Left arm, Patient Position: Sitting, Cuff Size: Adult Regular)   Pulse 97   Temp 97.4  F (36.3  C) (Tympanic)   Resp 16   Ht 1.956 m (6' 5\")   Wt 88.9 kg (196 lb)   SpO2 95%   BMI 23.24 kg/m    Body mass index is 23.24 kg/m .  Physical Exam   General: This is a comfortable appearing man wearing nasal cannula oxygen sitting in a wheelchair.  He has never needed supplemental oxygen when I had previously examined him nor was he in a wheelchair.  He states that he is using the wheelchair because he gets breathless with the extended walk from the car to the exam room.  Cardiovascular: The heart has a regular rhythm with a rate at the upper limit of normal.  Pulmonary: There is limited air movement, no wheezing, prolonged expiratory phase, no dullness to percussion, no use of accessory muscles, patient can speak in full sentences.  Extremities: No new edema clubbing or cyanosis.  Mental state: Appropriate mood and affect, well-groomed, normal speech although I think his memory might not be as good as it was a several years ago.        "     Signed Electronically by: Garrett Sotelo MD

## 2025-05-28 ENCOUNTER — RESULTS FOLLOW-UP (OUTPATIENT)
Dept: FAMILY MEDICINE | Facility: CLINIC | Age: 75
End: 2025-05-28
Payer: MEDICARE

## 2025-05-28 DIAGNOSIS — E87.6 HYPOKALEMIA: ICD-10-CM

## 2025-05-28 DIAGNOSIS — J96.01 ACUTE RESPIRATORY FAILURE WITH HYPOXIA (H): ICD-10-CM

## 2025-05-28 DIAGNOSIS — R73.9 HYPERGLYCEMIA: Primary | ICD-10-CM

## 2025-05-28 LAB
ALBUMIN SERPL BCG-MCNC: 4.2 G/DL (ref 3.5–5.2)
ALP SERPL-CCNC: 73 U/L (ref 40–150)
ALT SERPL W P-5'-P-CCNC: 56 U/L (ref 0–70)
ANION GAP SERPL CALCULATED.3IONS-SCNC: 11 MMOL/L (ref 7–15)
AST SERPL W P-5'-P-CCNC: 42 U/L (ref 0–45)
BILIRUB SERPL-MCNC: 0.6 MG/DL
BUN SERPL-MCNC: 14.1 MG/DL (ref 8–23)
CALCIUM SERPL-MCNC: 9.6 MG/DL (ref 8.8–10.4)
CHLORIDE SERPL-SCNC: 99 MMOL/L (ref 98–107)
CREAT SERPL-MCNC: 0.54 MG/DL (ref 0.67–1.17)
EGFRCR SERPLBLD CKD-EPI 2021: >90 ML/MIN/1.73M2
EST. AVERAGE GLUCOSE BLD GHB EST-MCNC: 97 MG/DL
GLUCOSE SERPL-MCNC: 126 MG/DL (ref 70–99)
HBA1C MFR BLD: 5 % (ref 0–5.6)
HCO3 SERPL-SCNC: 32 MMOL/L (ref 22–29)
POTASSIUM SERPL-SCNC: 3.3 MMOL/L (ref 3.4–5.3)
PROT SERPL-MCNC: 7.1 G/DL (ref 6.4–8.3)
SODIUM SERPL-SCNC: 142 MMOL/L (ref 135–145)
TSH SERPL DL<=0.005 MIU/L-ACNC: 0.61 UIU/ML (ref 0.3–4.2)

## 2025-05-28 NOTE — RESULT ENCOUNTER NOTE
The following letter pertains to your most recent diagnostic tests:    Good news! -Your hemoglobin A1c test which averages your blood sugars over the last 3 months returned normal.  No evidence for diabetes or prediabetes.         Sincerely,    Dr. Sotelo

## 2025-05-28 NOTE — RESULT ENCOUNTER NOTE
The following letter pertains to your most recent diagnostic tests:    The metabolic panel shows a mildly low blood potassium level.  Liver and kidney tests are normal.  The blood sugar is moderately elevated.  I have added a hemoglobin A1c blood test onto this lab sample to make sure that there is not diabetes or prediabetes.    The thyroid blood test TSH is normal.      The complete blood counts including the white blood cell count are normal suggesting adequate treatment for your pneumonia.      Bottom line: The lab results look okay for you with the exception of the mildly elevated blood glucose (sugar) and low blood potassium.    I suspect that the blood potassium level might be low due to not eating quite how you are used to since you are recovering from an acute illness.  I recommend rechecking the blood potassium level in 1 to 3 weeks.   You should schedule a lab appointment for that purpose.        If the blood potassium level is persistently low, we could discuss a potassium supplement.      I will be in touch with you with the results of the hemoglobin A1c blood test that will help us understand if the elevated blood glucose (sugar) reading represents diabetes or prediabetes.      Sincerely,    Dr. Sotelo

## 2025-05-28 NOTE — LETTER
May 28, 2025      Alexandro Lee  [address] [address] [address]        Dear ,    The following letter pertains to your most recent diagnostic tests:     The metabolic panel shows a mildly low blood potassium level.  Liver and kidney tests are normal.  The blood sugar is moderately elevated.  I have added a hemoglobin A1c blood test onto this lab sample to make sure that there is not diabetes or prediabetes.     The thyroid blood test TSH is normal.        The complete blood counts including the white blood cell count are normal suggesting adequate treatment for your pneumonia.        Bottom line: The lab results look okay for you with the exception of the mildly elevated blood glucose (sugar) and low blood potassium.     I suspect that the blood potassium level might be low due to not eating quite how you are used to since you are recovering from an acute illness.  I recommend rechecking the blood potassium level in 1 to 3 weeks.   You should schedule a lab appointment for that purpose.         If the blood potassium level is persistently low, we could discuss a potassium supplement.       I will be in touch with you with the results of the hemoglobin A1c blood test that will help us understand if the elevated blood glucose (sugar) reading represents diabetes or prediabetes.        Sincerely,     Dr. Sotelo    Resulted Orders   TSH with free T4 reflex   Result Value Ref Range    TSH 0.61 0.30 - 4.20 uIU/mL   Comprehensive metabolic panel (BMP + Alb, Alk Phos, ALT, AST, Total. Bili, TP)   Result Value Ref Range    Sodium 142 135 - 145 mmol/L    Potassium 3.3 (L) 3.4 - 5.3 mmol/L    Carbon Dioxide (CO2) 32 (H) 22 - 29 mmol/L    Anion Gap 11 7 - 15 mmol/L    Urea Nitrogen 14.1 8.0 - 23.0 mg/dL    Creatinine 0.54 (L) 0.67 - 1.17 mg/dL    GFR Estimate >90 >60 mL/min/1.73m2      Comment:      eGFR calculated using 2021 CKD-EPI equation.    Calcium 9.6 8.8 - 10.4 mg/dL    Chloride 99 98 - 107 mmol/L     Glucose 126 (H) 70 - 99 mg/dL    Alkaline Phosphatase 73 40 - 150 U/L    AST 42 0 - 45 U/L    ALT 56 0 - 70 U/L    Protein Total 7.1 6.4 - 8.3 g/dL    Albumin 4.2 3.5 - 5.2 g/dL    Bilirubin Total 0.6 <=1.2 mg/dL   CBC with platelets and differential   Result Value Ref Range    WBC Count 8.7 4.0 - 11.0 10e3/uL    RBC Count 5.50 4.40 - 5.90 10e6/uL    Hemoglobin 16.7 13.3 - 17.7 g/dL    Hematocrit 50.2 40.0 - 53.0 %    MCV 91 78 - 100 fL    MCH 30.4 26.5 - 33.0 pg    MCHC 33.3 31.5 - 36.5 g/dL    RDW 12.5 10.0 - 15.0 %    Platelet Count 250 150 - 450 10e3/uL    % Neutrophils 74 %    % Lymphocytes 13 %    % Monocytes 9 %    % Eosinophils 3 %    % Basophils 1 %    % Immature Granulocytes 1 %    Absolute Neutrophils 6.4 1.6 - 8.3 10e3/uL    Absolute Lymphocytes 1.1 0.8 - 5.3 10e3/uL    Absolute Monocytes 0.8 0.0 - 1.3 10e3/uL    Absolute Eosinophils 0.2 0.0 - 0.7 10e3/uL    Absolute Basophils 0.1 0.0 - 0.2 10e3/uL    Absolute Immature Granulocytes 0.1 <=0.4 10e3/uL

## 2025-05-28 NOTE — LETTER
May 28, 2025      Alexandro Lee  [address] [address] [address]        Dear ,    We are writing to inform you of your test results.    The following letter pertains to your most recent diagnostic tests:     Good news! -Your hemoglobin A1c test which averages your blood sugars over the last 3 months returned normal.  No evidence for diabetes or prediabetes.           Sincerely,     Dr. Sotelo       Resulted Orders   TSH with free T4 reflex   Result Value Ref Range    TSH 0.61 0.30 - 4.20 uIU/mL   Comprehensive metabolic panel (BMP + Alb, Alk Phos, ALT, AST, Total. Bili, TP)   Result Value Ref Range    Sodium 142 135 - 145 mmol/L    Potassium 3.3 (L) 3.4 - 5.3 mmol/L    Carbon Dioxide (CO2) 32 (H) 22 - 29 mmol/L    Anion Gap 11 7 - 15 mmol/L    Urea Nitrogen 14.1 8.0 - 23.0 mg/dL    Creatinine 0.54 (L) 0.67 - 1.17 mg/dL    GFR Estimate >90 >60 mL/min/1.73m2      Comment:      eGFR calculated using 2021 CKD-EPI equation.    Calcium 9.6 8.8 - 10.4 mg/dL    Chloride 99 98 - 107 mmol/L    Glucose 126 (H) 70 - 99 mg/dL    Alkaline Phosphatase 73 40 - 150 U/L    AST 42 0 - 45 U/L    ALT 56 0 - 70 U/L    Protein Total 7.1 6.4 - 8.3 g/dL    Albumin 4.2 3.5 - 5.2 g/dL    Bilirubin Total 0.6 <=1.2 mg/dL   CBC with platelets and differential   Result Value Ref Range    WBC Count 8.7 4.0 - 11.0 10e3/uL    RBC Count 5.50 4.40 - 5.90 10e6/uL    Hemoglobin 16.7 13.3 - 17.7 g/dL    Hematocrit 50.2 40.0 - 53.0 %    MCV 91 78 - 100 fL    MCH 30.4 26.5 - 33.0 pg    MCHC 33.3 31.5 - 36.5 g/dL    RDW 12.5 10.0 - 15.0 %    Platelet Count 250 150 - 450 10e3/uL    % Neutrophils 74 %    % Lymphocytes 13 %    % Monocytes 9 %    % Eosinophils 3 %    % Basophils 1 %    % Immature Granulocytes 1 %    Absolute Neutrophils 6.4 1.6 - 8.3 10e3/uL    Absolute Lymphocytes 1.1 0.8 - 5.3 10e3/uL    Absolute Monocytes 0.8 0.0 - 1.3 10e3/uL    Absolute Eosinophils 0.2 0.0 - 0.7 10e3/uL    Absolute Basophils 0.1 0.0 - 0.2 10e3/uL     Absolute Immature Granulocytes 0.1 <=0.4 10e3/uL   HEMOGLOBIN A1C   Result Value Ref Range    Estimated Average Glucose 97 <117 mg/dL    Hemoglobin A1C 5.0 0.0 - 5.6 %      Comment:      Normal <5.7%   Prediabetes 5.7-6.4%    Diabetes 6.5% or higher     Note: Adopted from ADA consensus guidelines.       If you have any questions or concerns, please call the clinic at the number listed above.       Sincerely,      Garrett Sotelo MD    Electronically signed

## 2025-05-29 ENCOUNTER — ANCILLARY PROCEDURE (OUTPATIENT)
Dept: CT IMAGING | Facility: CLINIC | Age: 75
End: 2025-05-29
Attending: INTERNAL MEDICINE
Payer: MEDICARE

## 2025-05-29 DIAGNOSIS — J96.01 ACUTE RESPIRATORY FAILURE WITH HYPOXIA (H): ICD-10-CM

## 2025-05-29 PROBLEM — R91.8 PULMONARY NODULES: Status: ACTIVE | Noted: 2025-05-29

## 2025-05-29 PROCEDURE — 250N000009 HC RX 250: Performed by: INTERNAL MEDICINE

## 2025-05-29 PROCEDURE — 71275 CT ANGIOGRAPHY CHEST: CPT

## 2025-05-29 PROCEDURE — 250N000011 HC RX IP 250 OP 636: Performed by: INTERNAL MEDICINE

## 2025-05-29 RX ORDER — IOPAMIDOL 755 MG/ML
88 INJECTION, SOLUTION INTRAVASCULAR ONCE
Status: COMPLETED | OUTPATIENT
Start: 2025-05-29 | End: 2025-05-29

## 2025-05-29 RX ADMIN — SODIUM CHLORIDE 85 ML: 9 INJECTION, SOLUTION INTRAVENOUS at 14:02

## 2025-05-29 RX ADMIN — IOPAMIDOL 88 ML: 755 INJECTION, SOLUTION INTRAVENOUS at 14:02

## 2025-05-29 NOTE — RESULT ENCOUNTER NOTE
The following letter pertains to your most recent diagnostic tests:    Great news!  The CT scan does not show any dangerous blood clots in the lung (pulmonary emboli) to explain your low blood oxygen levels and elevated heart rate.    The CT scan does show some shadows in the lungs that were present in 2022 that likely reflect chronic inflammation in the lungs.  This finding should be discussed with your lung specialist when you see her next.  This could be part of the reason why it is difficult for you to wean off of oxygen.    There are also new nodules in the right lung that will require a follow-up CT scan in 2 months.  Lung nodules are very common findings on lung CT scans and are usually not dangerous.  It is very difficult to determine the nature of the lung nodule with 1 CT scan.  It is necessary to follow lung nodules over time to make sure that they are not growing and not dangerous for you.  As such, I would like you to schedule a follow-up CT scan in 2 months.  Please call [facility] at [phone] to schedule.     Sincerely,    Dr. Sotelo

## 2025-06-10 ENCOUNTER — VIRTUAL VISIT (OUTPATIENT)
Dept: PHARMACY | Facility: CLINIC | Age: 75
End: 2025-06-10

## 2025-06-10 DIAGNOSIS — E78.5 HYPERLIPIDEMIA LDL GOAL <100: ICD-10-CM

## 2025-06-10 DIAGNOSIS — R00.0 TACHYCARDIA: ICD-10-CM

## 2025-06-10 DIAGNOSIS — J45.40 MODERATE PERSISTENT ASTHMA WITHOUT COMPLICATION: ICD-10-CM

## 2025-06-10 DIAGNOSIS — G47.00 INSOMNIA, UNSPECIFIED TYPE: ICD-10-CM

## 2025-06-10 DIAGNOSIS — K21.00 GASTROESOPHAGEAL REFLUX DISEASE WITH ESOPHAGITIS, UNSPECIFIED WHETHER HEMORRHAGE: ICD-10-CM

## 2025-06-10 DIAGNOSIS — R09.81 SINUS CONGESTION: ICD-10-CM

## 2025-06-10 DIAGNOSIS — R05.3 CHRONIC COUGH: Primary | ICD-10-CM

## 2025-06-10 DIAGNOSIS — I10 ESSENTIAL HYPERTENSION: ICD-10-CM

## 2025-06-10 DIAGNOSIS — Z78.9 TAKES DIETARY SUPPLEMENTS: ICD-10-CM

## 2025-06-10 PROCEDURE — 99207 PR NO CHARGE LOS: CPT | Mod: 93 | Performed by: PHARMACIST

## 2025-06-10 RX ORDER — OMEPRAZOLE 20 MG/1
20 CAPSULE, DELAYED RELEASE ORAL DAILY
COMMUNITY

## 2025-06-10 RX ORDER — PHENOL 1.4 %
10 AEROSOL, SPRAY (ML) MUCOUS MEMBRANE
COMMUNITY

## 2025-06-10 RX ORDER — OXYMETAZOLINE HYDROCHLORIDE 0.05 G/100ML
2 SPRAY NASAL 2 TIMES DAILY PRN
COMMUNITY

## 2025-06-10 NOTE — PROGRESS NOTES
Medication Therapy Management (MTM) Encounter    ASSESSMENT:                            Medication Adherence/Access: No issues identified.    Sinus Congestion/Cough   Per review of [employer]rom[employer], if symptoms are medication related or worsened by medications, the following may be involved:  - Atorvastatin: reported to cause nasopharyngitis in 4.2-12.9% of patients taking  - Trelegy Ellipta: reported to cause nasopharyngitis in 15-17% of patients taking, rhinitis in 1% of patients and sinusitis in 1%.  He was recently changed to Trelegy and didn't notice change in symptoms, making this less likely.  - Hydralazine: nasal congestion has been reported, exact frequency unknown  - Montelukast: nasal congestion reported in 1.6% of patients taking, nasal discharge in >2%, rhinitis in >2% and sinusitis in >1%.      Given that he feels timeline of symptoms onset aligns with starting montelukast, it may be worthwhile to hold montelukast to see if symptoms improve.  Montelukast half life is 5.5 hours, so we should know within a few days if symptoms are improving.    If no change in symptoms seen with holding montelukast, could consider holding atorvastatin.  If symptoms did improve off atorvastatin, would need to change to alternative statin therapy.    Hypertension/Tachycardia  Stable. Patient is meeting blood pressure goal of < 130/80mmHg.      Hyperlipidemia   Stable. LDL is at goal <100mg/dL.    Asthma   Stable aside from congestion/cough.    GERD  Stable.      Insomnia  Stable.     Supplements  Stable.      PLAN:                            Hold montelukast for the next week.  Let me know if your sinus symptoms improve.  If breathing worsens while off montelukast, resume immediately.  If not, we may consider holding atorvastatin, but if this helps your symptoms we will need to change to a different statin.    Follow-up: Return in about 1 week (around 6/17/2025) for let me know how things are going off  "montelukast.    SUBJECTIVE/OBJECTIVE:                          Alexandro Lee is a 75 year old male seen for an initial visit. He was referred to me from LUCIE Salinas CNP.  His primary care provider is Dr. Sotelo.      Reason for visit: Comprehensive medication review.    Allergies/ADRs: Reviewed in chart  Past Medical History: Reviewed in chart  Tobacco: He reports that he has never smoked. He has never used smokeless tobacco.  Alcohol: not currently using  Caffeine: 1 cup of coffee/day  Other Substance Use: none  Activity: Currently limited due to O2 and recent hospitalization    Medication Adherence/Access: Patient takes medications directly from bottles.  Patient takes medications 2 time(s) per day.   Per patient, misses medication 0 time(s) per week.     Sinus Congestion/Cough  Afrin nasal spray 2 sprays twice daily as needed - never uses longer than 2-3 days, is aware of potential for rebound congestion  Xlear saline nasal spray as needed   He reports having \"this sinus problem\" for years - starting around the same time he was diagnosed with asthma.  Reports a strong family history of sinus problems.  He primarily experiences drainage in the back of his throat, only occurs during the daytime.  Antihistamines don't seem to help.  He questions if medications are contributing.    Hypertension /Tachycardia  Atenolol 25mg every morning and 12.5mg every evening   Felodipine ER 10mg daily  Hydralazine 50mg twice daily   Patient reports no current medication side effects  Patient does not self-monitor blood pressure.      Hyperlipidemia   Atorvastatin 20mg daily  Patient reports no significant myalgias or other side effects.  He reports atorvastatin was started about a year ago, had sinus issues preceding that but feels they've gotten worse over that timeframe.     Asthma   Albuterol MDI as needed - no use since discharge from hospital  Albuterol neb - not currently using  Montelukast 10mg at bedtime - started " around the same time as asthma diagnosis  Trelegy 100mcg daily (PM)  Patient rinses their mouth after using steroid inhaler.   Patient reports no current medication side effects.      Patient reports the following symptoms: recent hospitalization for pneumonia (outside facility, we don't have records).     GERD    Omeprazole 20 mg once daily   Patient reports no current symptoms.   Patient feels that current regimen is effective.      Insomnia   Melatonin 10mg at bedtime as needed   Patient reports this regimen is effective.  Denies side effects.     Supplements   Daily multivitamin   No reported issues at this time.        Today's Vitals: There were no vitals taken for this visit.  ----------------    I spent 15 minutes with this patient today. All changes were made via collaborative practice agreement with Dr. Sotelo.     The patient declined a summary of these recommendations.    Pushpa MccallD, [facility]  Medication Therapy Management Provider  [phone]     Telemedicine Visit Details  The patient's medications can be safely assessed via a telemedicine encounter.  Type of service:  Telephone visit  Originating Location (pt. Location): Home    Distant Location (provider location):  On-site  Start Time: 11:18 AM  End Time: 11:33 AM     Medication Therapy Recommendations  Moderate persistent asthma   1 Current Medication: montelukast (SINGULAIR) 10 MG tablet   Current Medication Sig: TAKE 1 TABLET AT BEDTIME   Rationale: Undesirable effect - Adverse medication event - Safety   Recommendation: Discontinue Medication   Status: Accepted per CPA   Identified Date: 6/10/2025 Completed Date: 6/10/2025

## 2025-06-11 ENCOUNTER — OFFICE VISIT (OUTPATIENT)
Dept: FAMILY MEDICINE | Facility: CLINIC | Age: 75
End: 2025-06-11
Payer: MEDICARE

## 2025-06-11 VITALS
HEART RATE: 82 BPM | BODY MASS INDEX: 23.62 KG/M2 | HEIGHT: 77 IN | WEIGHT: 200 LBS | TEMPERATURE: 98.1 F | SYSTOLIC BLOOD PRESSURE: 106 MMHG | DIASTOLIC BLOOD PRESSURE: 68 MMHG | OXYGEN SATURATION: 94 % | RESPIRATION RATE: 16 BRPM

## 2025-06-11 DIAGNOSIS — J18.9 PNEUMONIA DUE TO INFECTIOUS ORGANISM, UNSPECIFIED LATERALITY, UNSPECIFIED PART OF LUNG: Primary | ICD-10-CM

## 2025-06-11 DIAGNOSIS — J96.01 ACUTE RESPIRATORY FAILURE WITH HYPOXIA (H): ICD-10-CM

## 2025-06-11 DIAGNOSIS — R91.8 PULMONARY NODULES: ICD-10-CM

## 2025-06-11 ASSESSMENT — PAIN SCALES - GENERAL: PAINLEVEL_OUTOF10: NO PAIN (0)

## 2025-06-11 NOTE — PROGRESS NOTES
Assessment & Plan     Pneumonia due to infectious organism, unspecified laterality, unspecified part of lung  Improving after antimicrobial therapy    Acute respiratory failure with hypoxia (H)  Improving oxygen requirement with recovery from pneumonia  No PE noted on recent CT scan    Pulmonary nodules  He has nodular interstitial opacities as well as lung nodules noted on his CT scan  He will require interval follow-up CT scan at a 3-month interval  He was advised to discuss the nodular interstitial opacities with his pulmonologist when he sees her later this week to see if any additional evaluation is needed    With respect to his chronic nasal symptoms, they do seem to be improving after stopping Singulair which is curious to me but if his asthma does not seem to worsen with stopping Singulair and his nasal symptoms improve I cannot argue with success.  He could discuss whether any additional asthma treatments are needed with his pulmonologist when he sees her later this week and lieu of Singulair.    With respect to a trial off of his statin to see if that improves his nasal symptoms, if he does find that he feels better off of atorvastatin he would require an alternative statin medication and I would recommend rosuvastatin if that were the case.  He will contact us if he does feel better off of the atorvastatin in which case a prescription for rosuvastatin can be sent to his pharmacy and a lipid panel can be obtained when I see him this fall for his preventive exam.            Follow-up  Return in about 16 weeks (around 10/1/2025) for Preventive Visit.  Patient instructed to return to clinic or contact us sooner if symptoms worsen or new symptoms develop.     Jaiden Mc is a 75 year old, presenting for the following health issues:  Follow Up        6/11/2025     1:51 PM   Additional Questions   Roomed by Desiree   Accompanied by Wife anna     HPI          Follow-up pneumonia, hypoxic respiratory  "failure, chronic obstructive lung disease, abnormal chest CT    Alexandro is feeling much better since last visit  He is now not using oxygen when at rest although he sometimes requires it with exertion  He is no longer using a wheelchair to get around  His cough has resolved  His energy levels are improving  He has an appointment to see his pulmonologist later this week  We reviewed his CT results including the nodules and the opacities    He met with the pharmacist who informed him that his Singulair may be causing his nasal symptoms  He stopped his Singulair yesterday and his nose feels better today  It was recommended that he take a 10-day trial off of Singulair to see if that improves his symptoms  He was also told that he could stop his atorvastatin for 10 days to see if that also improves his nasal symptoms          Objective    /68 (BP Location: Right arm, Patient Position: Sitting, Cuff Size: Adult Large)   Pulse 82   Temp 98.1  F (36.7  C) (Tympanic)   Resp 16   Ht 1.956 m (6' 5\")   Wt 90.7 kg (200 lb)   SpO2 94%   BMI 23.72 kg/m    Body mass index is 23.72 kg/m .  Physical Exam   General: This is a much improved appearing man in no acute distress.  He speaks in full sentences and does not appear toxic.  Pulmonary: The lungs are clear to auscultation bilaterally, breathing is not appear to be labored, no wheezing, no rhonchi.  Extremities: No obvious edema or cyanosis.            Signed Electronically by: Garrett Sotelo MD    "

## 2025-06-12 NOTE — PATIENT INSTRUCTIONS
"Recommendations from today's MTM visit:                                                    MTM (medication therapy management) is a service provided by a clinical pharmacist designed to help you get the most of out of your medicines.   Today we reviewed what your medicines are for, how to know if they are working, that your medicines are safe and how to make your medicine regimen as easy as possible.      Hold montelukast for the next week.  Let me know if your sinus symptoms improve.  If breathing worsens while off montelukast, resume immediately.  If not, we may consider holding atorvastatin, but if this helps your symptoms we will need to change to a different statin.    Follow-up: Return in about 1 week (around 6/17/2025) for let me know how things are going off montelukast.    It was great speaking with you today.  I value your experience and would be very thankful for your time in providing feedback in our clinic survey. In the next few days, you may receive an email or text message from [employer] with a link to a survey related to your  clinical pharmacist.\"     To schedule another MTM appointment, please call the clinic directly or you may call the MTM scheduling line at [phone] or toll-free at [phone].     My Clinical Pharmacist's contact information:                                                      Please feel free to contact me with any questions or concerns you have.      Karla Lacey, PharmD  Medication Therapy Management Pharmacy Resident  [facility]  [phone]    Giana Valentino PharmD, [facility]  Medication Therapy Management Provider  [facility]  [phone]     "

## 2025-06-18 ENCOUNTER — TELEPHONE (OUTPATIENT)
Dept: PHARMACY | Facility: CLINIC | Age: 75
End: 2025-06-18
Payer: MEDICARE

## 2025-06-18 RX ORDER — CETIRIZINE HYDROCHLORIDE 10 MG/1
10 TABLET ORAL DAILY
COMMUNITY

## 2025-06-18 NOTE — TELEPHONE ENCOUNTER
"Received call from Alexandro corado with an update from our last visit.    He stopped montelukast and sinus symptoms have improved.  However, he noticed his eyes have been more \"gunky\" so he started Zyrtec 10mg daily instead.  Since starting Zyrtec, he's doing well.  He wishes to continue with this regimen.  Medication list updated.    He wishes to still do trial off atorvastatin so he will start that tomorrow.  I've asked him to update me next week; if this hold helps his symptoms will need to try another statin.  Will await update next week.    Giana Valentino, PharmD, [facility]  Medication Therapy Management Provider  [phone]     "

## 2025-07-14 DIAGNOSIS — J00 CORYZA: ICD-10-CM

## 2025-07-14 RX ORDER — AZELASTINE 1 MG/ML
1 SPRAY, METERED NASAL 2 TIMES DAILY
Qty: 30 ML | Refills: 3 | Status: SHIPPED | OUTPATIENT
Start: 2025-07-14

## 2025-07-14 NOTE — TELEPHONE ENCOUNTER
Medication Question or Refill    Contacts       Contact Date/Time Type Contact Phone/Fax    07/14/2025 01:47 PM CDT Phone (Incoming) Alexandro Lee (Self) [phone] (M)            What medication are you calling about (include dose and sig)?: azelastine hcl    [Cannot see the medication.]     Preferred Pharmacy:     [id number] - [place], [place] - [address]  [id number]  [address]  [facility] [address]  Phone: [phone] Fax: [phone]    Controlled Substance Agreement on file:   CSA -- Patient Level:    CSA: None found at the patient level.       Who prescribed the medication?: Patient unsure     Do you need a refill? Yes    When did you use the medication last? Completely out.      Patient offered an appointment? No    Do you have any questions or concerns?  No      Okay to leave a detailed message?: Yes at Cell number on file:    Telephone Information:   Mobile [phone]

## 2025-07-14 NOTE — TELEPHONE ENCOUNTER
Clinic RN: Please investigate patient's chart or contact patient if the information cannot be found because the medication is listed as historical or discontinued. Confirm patient is taking this medication. Document findings and route refill encounter to provider for approval or denial.    Angelic Mooney RN on 7/14/2025 at 3:07 PM

## 2025-07-24 ENCOUNTER — TELEPHONE (OUTPATIENT)
Dept: PHARMACY | Facility: CLINIC | Age: 75
End: 2025-07-24
Payer: MEDICARE

## 2025-07-24 NOTE — TELEPHONE ENCOUNTER
Received phone call from Alexandro.  He wanted to follow-up - he held statin for 10 days and didn't notice any changes so he has since resumed.    He remains off Singulair (montelukast) and is overall feeling much better.  Is sleeping well and experiencing less phlegm.  No changes with breathing.      Will follow-up in 1 year.    Giana Valentino, PharmD, [facility]  Medication Therapy Management Provider  [phone]

## 2025-08-20 ENCOUNTER — ANCILLARY PROCEDURE (OUTPATIENT)
Dept: CT IMAGING | Facility: CLINIC | Age: 75
End: 2025-08-20
Attending: INTERNAL MEDICINE
Payer: MEDICARE

## 2025-08-20 DIAGNOSIS — J96.01 ACUTE RESPIRATORY FAILURE WITH HYPOXIA (H): ICD-10-CM

## 2025-08-20 PROCEDURE — 71250 CT THORAX DX C-: CPT

## (undated) DEVICE — SOL WATER IRRIG 1000ML BOTTLE 2F7114

## (undated) DEVICE — PAD CHUX UNDERPAD 30X36" P3036C

## (undated) DEVICE — TUBING SUCTION MEDI-VAC SOFT 3/16"X20' N520A

## (undated) DEVICE — GOWN XLG DISP 9545

## (undated) DEVICE — ENDO FORCEP SPIKED SERRATED SHAFT JUMBO 239CM G56998

## (undated) DEVICE — SUCTION MANIFOLD NEPTUNE 2 SYS 4 PORT 0702-020-000

## (undated) DEVICE — BAG CLEAR TRASH 1.3M 39X33" P4040C

## (undated) DEVICE — ENDO SNARE EXACTO COLD 9MM LOOP 2.4MMX230CM 00711115

## (undated) DEVICE — SUCTION CANISTER MEDIVAC LINER 3000ML W/LID 65651-530

## (undated) DEVICE — SOL WATER IRRIG 500ML BOTTLE 2F7113

## (undated) RX ORDER — PROPOFOL 10 MG/ML
INJECTION, EMULSION INTRAVENOUS
Status: DISPENSED
Start: 2023-07-20

## (undated) RX ORDER — LIDOCAINE HYDROCHLORIDE 10 MG/ML
INJECTION, SOLUTION EPIDURAL; INFILTRATION; INTRACAUDAL; PERINEURAL
Status: DISPENSED
Start: 2023-07-20

## (undated) RX ORDER — ONDANSETRON 2 MG/ML
INJECTION INTRAMUSCULAR; INTRAVENOUS
Status: DISPENSED
Start: 2023-07-20

## (undated) RX ORDER — GLYCOPYRROLATE 0.2 MG/ML
INJECTION INTRAMUSCULAR; INTRAVENOUS
Status: DISPENSED
Start: 2023-07-20